# Patient Record
Sex: MALE | Race: BLACK OR AFRICAN AMERICAN | NOT HISPANIC OR LATINO | URBAN - METROPOLITAN AREA
[De-identification: names, ages, dates, MRNs, and addresses within clinical notes are randomized per-mention and may not be internally consistent; named-entity substitution may affect disease eponyms.]

---

## 2020-01-17 VITALS
HEIGHT: 70 IN | RESPIRATION RATE: 18 BRPM | DIASTOLIC BLOOD PRESSURE: 79 MMHG | OXYGEN SATURATION: 96 % | WEIGHT: 220.02 LBS | HEART RATE: 71 BPM | SYSTOLIC BLOOD PRESSURE: 132 MMHG | TEMPERATURE: 98 F

## 2020-01-17 NOTE — ED ADULT TRIAGE NOTE - CHIEF COMPLAINT QUOTE
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

## 2020-01-17 NOTE — ED ADULT NURSE NOTE - CHIEF COMPLAINT QUOTE
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

## 2020-01-18 ENCOUNTER — INPATIENT (INPATIENT)
Facility: HOSPITAL | Age: 40
LOS: 45 days | Discharge: HOME | DRG: 885 | End: 2020-03-04
Attending: PSYCHIATRY & NEUROLOGY | Admitting: PSYCHIATRY & NEUROLOGY
Payer: MEDICARE

## 2020-01-18 DIAGNOSIS — F29 UNSPECIFIED PSYCHOSIS NOT DUE TO A SUBSTANCE OR KNOWN PHYSIOLOGICAL CONDITION: ICD-10-CM

## 2020-01-18 LAB
ALBUMIN SERPL ELPH-MCNC: 4.1 G/DL — SIGNIFICANT CHANGE UP (ref 3.3–5)
ALP SERPL-CCNC: 64 U/L — SIGNIFICANT CHANGE UP (ref 40–120)
ALT FLD-CCNC: 44 U/L — SIGNIFICANT CHANGE UP (ref 10–45)
AMPHET UR-MCNC: NEGATIVE — SIGNIFICANT CHANGE UP
ANION GAP SERPL CALC-SCNC: 11 MMOL/L — SIGNIFICANT CHANGE UP (ref 5–17)
APAP SERPL-MCNC: <5 UG/ML — LOW (ref 10–30)
APPEARANCE UR: CLEAR — SIGNIFICANT CHANGE UP
AST SERPL-CCNC: 52 U/L — HIGH (ref 10–40)
BARBITURATES UR SCN-MCNC: NEGATIVE — SIGNIFICANT CHANGE UP
BASOPHILS # BLD AUTO: 0.04 K/UL — SIGNIFICANT CHANGE UP (ref 0–0.2)
BASOPHILS NFR BLD AUTO: 0.5 % — SIGNIFICANT CHANGE UP (ref 0–2)
BENZODIAZ UR-MCNC: NEGATIVE — SIGNIFICANT CHANGE UP
BILIRUB SERPL-MCNC: 0.6 MG/DL — SIGNIFICANT CHANGE UP (ref 0.2–1.2)
BILIRUB UR-MCNC: NEGATIVE — SIGNIFICANT CHANGE UP
BUN SERPL-MCNC: 17 MG/DL — SIGNIFICANT CHANGE UP (ref 7–23)
CALCIUM SERPL-MCNC: 9.2 MG/DL — SIGNIFICANT CHANGE UP (ref 8.4–10.5)
CHLORIDE SERPL-SCNC: 100 MMOL/L — SIGNIFICANT CHANGE UP (ref 96–108)
CO2 SERPL-SCNC: 26 MMOL/L — SIGNIFICANT CHANGE UP (ref 22–31)
COCAINE METAB.OTHER UR-MCNC: NEGATIVE — SIGNIFICANT CHANGE UP
COLOR SPEC: YELLOW — SIGNIFICANT CHANGE UP
CREAT SERPL-MCNC: 0.83 MG/DL — SIGNIFICANT CHANGE UP (ref 0.5–1.3)
DIFF PNL FLD: NEGATIVE — SIGNIFICANT CHANGE UP
EOSINOPHIL # BLD AUTO: 0.21 K/UL — SIGNIFICANT CHANGE UP (ref 0–0.5)
EOSINOPHIL NFR BLD AUTO: 2.8 % — SIGNIFICANT CHANGE UP (ref 0–6)
ETHANOL SERPL-MCNC: <10 MG/DL — SIGNIFICANT CHANGE UP (ref 0–10)
GLUCOSE SERPL-MCNC: 82 MG/DL — SIGNIFICANT CHANGE UP (ref 70–99)
GLUCOSE UR QL: NEGATIVE — SIGNIFICANT CHANGE UP
HCT VFR BLD CALC: 43 % — SIGNIFICANT CHANGE UP (ref 39–50)
HGB BLD-MCNC: 14.3 G/DL — SIGNIFICANT CHANGE UP (ref 13–17)
IMM GRANULOCYTES NFR BLD AUTO: 0.1 % — SIGNIFICANT CHANGE UP (ref 0–1.5)
KETONES UR-MCNC: 15 MG/DL
LEUKOCYTE ESTERASE UR-ACNC: NEGATIVE — SIGNIFICANT CHANGE UP
LYMPHOCYTES # BLD AUTO: 1.54 K/UL — SIGNIFICANT CHANGE UP (ref 1–3.3)
LYMPHOCYTES # BLD AUTO: 20.6 % — SIGNIFICANT CHANGE UP (ref 13–44)
MCHC RBC-ENTMCNC: 25.8 PG — LOW (ref 27–34)
MCHC RBC-ENTMCNC: 33.3 GM/DL — SIGNIFICANT CHANGE UP (ref 32–36)
MCV RBC AUTO: 77.5 FL — LOW (ref 80–100)
METHADONE UR-MCNC: NEGATIVE — SIGNIFICANT CHANGE UP
MONOCYTES # BLD AUTO: 0.71 K/UL — SIGNIFICANT CHANGE UP (ref 0–0.9)
MONOCYTES NFR BLD AUTO: 9.5 % — SIGNIFICANT CHANGE UP (ref 2–14)
NEUTROPHILS # BLD AUTO: 4.97 K/UL — SIGNIFICANT CHANGE UP (ref 1.8–7.4)
NEUTROPHILS NFR BLD AUTO: 66.5 % — SIGNIFICANT CHANGE UP (ref 43–77)
NITRITE UR-MCNC: NEGATIVE — SIGNIFICANT CHANGE UP
NRBC # BLD: 0 /100 WBCS — SIGNIFICANT CHANGE UP (ref 0–0)
OPIATES UR-MCNC: NEGATIVE — SIGNIFICANT CHANGE UP
PCP SPEC-MCNC: SIGNIFICANT CHANGE UP
PCP UR-MCNC: NEGATIVE — SIGNIFICANT CHANGE UP
PH UR: 6 — SIGNIFICANT CHANGE UP (ref 5–8)
PLATELET # BLD AUTO: 263 K/UL — SIGNIFICANT CHANGE UP (ref 150–400)
POTASSIUM SERPL-MCNC: 4.2 MMOL/L — SIGNIFICANT CHANGE UP (ref 3.5–5.3)
POTASSIUM SERPL-SCNC: 4.2 MMOL/L — SIGNIFICANT CHANGE UP (ref 3.5–5.3)
PROT SERPL-MCNC: 7.5 G/DL — SIGNIFICANT CHANGE UP (ref 6–8.3)
PROT UR-MCNC: NEGATIVE MG/DL — SIGNIFICANT CHANGE UP
RBC # BLD: 5.55 M/UL — SIGNIFICANT CHANGE UP (ref 4.2–5.8)
RBC # FLD: 14.2 % — SIGNIFICANT CHANGE UP (ref 10.3–14.5)
SALICYLATES SERPL-MCNC: <0.3 MG/DL — LOW (ref 2.8–20)
SODIUM SERPL-SCNC: 137 MMOL/L — SIGNIFICANT CHANGE UP (ref 135–145)
SP GR SPEC: >=1.03 — SIGNIFICANT CHANGE UP (ref 1–1.03)
THC UR QL: POSITIVE
TSH SERPL-MCNC: 2.5 UIU/ML — SIGNIFICANT CHANGE UP (ref 0.35–4.94)
UROBILINOGEN FLD QL: 0.2 E.U./DL — SIGNIFICANT CHANGE UP
WBC # BLD: 7.48 K/UL — SIGNIFICANT CHANGE UP (ref 3.8–10.5)
WBC # FLD AUTO: 7.48 K/UL — SIGNIFICANT CHANGE UP (ref 3.8–10.5)

## 2020-01-18 PROCEDURE — 90792 PSYCH DIAG EVAL W/MED SRVCS: CPT | Mod: GT

## 2020-01-18 PROCEDURE — 93010 ELECTROCARDIOGRAM REPORT: CPT

## 2020-01-18 PROCEDURE — 70450 CT HEAD/BRAIN W/O DYE: CPT | Mod: 26

## 2020-01-18 PROCEDURE — 71046 X-RAY EXAM CHEST 2 VIEWS: CPT | Mod: 26

## 2020-01-18 PROCEDURE — 99285 EMERGENCY DEPT VISIT HI MDM: CPT

## 2020-01-18 RX ORDER — TRAZODONE HCL 50 MG
50 TABLET ORAL AT BEDTIME
Refills: 0 | Status: DISCONTINUED | OUTPATIENT
Start: 2020-01-18 | End: 2020-01-30

## 2020-01-18 RX ORDER — OLANZAPINE 15 MG/1
5 TABLET, FILM COATED ORAL DAILY
Refills: 0 | Status: DISCONTINUED | OUTPATIENT
Start: 2020-01-18 | End: 2020-01-19

## 2020-01-18 RX ORDER — HYDROXYZINE HCL 10 MG
25 TABLET ORAL EVERY 6 HOURS
Refills: 0 | Status: DISCONTINUED | OUTPATIENT
Start: 2020-01-18 | End: 2020-02-12

## 2020-01-18 RX ORDER — IBUPROFEN 200 MG
600 TABLET ORAL EVERY 6 HOURS
Refills: 0 | Status: DISCONTINUED | OUTPATIENT
Start: 2020-01-18 | End: 2020-02-19

## 2020-01-18 RX ORDER — OLANZAPINE 15 MG/1
5 TABLET, FILM COATED ORAL ONCE
Refills: 0 | Status: COMPLETED | OUTPATIENT
Start: 2020-01-18 | End: 2020-01-18

## 2020-01-18 RX ADMIN — Medication 1 MILLIGRAM(S): at 06:04

## 2020-01-18 RX ADMIN — Medication 600 MILLIGRAM(S): at 13:07

## 2020-01-18 RX ADMIN — OLANZAPINE 5 MILLIGRAM(S): 15 TABLET, FILM COATED ORAL at 12:37

## 2020-01-18 NOTE — ED BEHAVIORAL HEALTH ASSESSMENT NOTE - DETAILS
pt tells me he has tried to hurt himself and that he has tried to cut himself. He later says he has tried to kill himself with "medicine, drugs, all kinds of stuff" says didn't respond well to "the shot" self referred discussed with ED attending

## 2020-01-18 NOTE — ED BEHAVIORAL HEALTH ASSESSMENT NOTE - DESCRIPTION
PRE-HOSPITAL COURSE  ===================  SOURCE:  Second-hand information via covering RN.     DETAILS:  Patient self-presented to the ED.    ============  ED COURSE   ============  SOURCE:  Covering RN, Bal.   ARRIVAL:  Patient self-presented and unaccompanied by family or social supports.  BELONGINGS:  Clothing and backpack. Nothing of note in patient’s belongings.   BEHAVIOR: Complied with triage protocols - provided blood/urine changed into a hospital gown, allowed security to wand/collect belongings without incident, denies SI/HI, is guarded with a flat affect, speech is at a normal rate, clear/audible, fair articulation/tone, linear thought content, fair judgement/insight, poor eye contact (looking at the floor), fair hygiene/grooming, mildly malodorous, dressed appropriately for the weather, endorsed AH (refused to disclose any further) denies delusions, ate a meal, has been drinking fluids, resting calmly on the stretcher and is AXO3. No aggression or behavioral issues reported.  TREATMENT: No prns meds, no security interventions or restraints required. benign neck tumor removed 8 years ago Pt born in [place] and reports coming here 1-2 weeks ago via [employer] as he felt endangered. He reports unclear employment and no current housing stating "I'm trying to get my priorities straight." He completed his GED, says he is  and states "who knows:" when asked if he has children. He can not identify any supports but rather states it's possible that people are looking out for him.

## 2020-01-18 NOTE — ED BEHAVIORAL HEALTH ASSESSMENT NOTE - ADDITIONAL DETAILS / COMMENTS
Notably unable to spell world backwards, earth forward, or his first name Kuldip claros. Replies Notably unable to spell world backwards, earth forward, or his first name Kuldip backwards. Replies vaguely to questions regarding specific symptoms. Pt is appropriately clothed and groomed with freshly braided hair.

## 2020-01-18 NOTE — ED PROVIDER NOTE - OBJECTIVE STATEMENT
38 y/o M, reports years of lymphadenopathy, reports he had a neck tumor that was biopsied and benign in the past; however details unclear, presents to the ED after hearing voices, but is unwilling to talk about that the voices are saying. When asked if suicidal, pt did not endorse this, but said "well, I could be." Denies HI or previous admitted psychiatric diagnoses. Denies sore throat, URI symptoms, weight loss, or night sweats. Pt endorses marijuana use 1 month ago.

## 2020-01-18 NOTE — ED PROVIDER NOTE - PSYCHIATRIC, MLM
Alert and oriented to person, place, time/situation. paranoid and flat affect. no apparent risk to self or others.

## 2020-01-18 NOTE — ED BEHAVIORAL HEALTH ASSESSMENT NOTE - RISK ASSESSMENT
unable to determine suicide risk based on unreliable history provision. Unable to determine Suicide Risk

## 2020-01-18 NOTE — ED ADULT NURSE REASSESSMENT NOTE - NS ED NURSE REASSESS COMMENT FT1
Pt received from NOELLE Figueroa. Pt reports suicidal/homicidal ideation at this time, visual and auditory hallucinations. Pt calm and lying in stretcher at this time, awaiting telepsych consult. Meal provided. Pt denies physical complaints, speaking in full clear sentences. No injuries noted.

## 2020-01-18 NOTE — ED BEHAVIORAL HEALTH ASSESSMENT NOTE - REASON FOR REFERRAL
Quality 226: Preventive Care And Screening: Tobacco Use: Screening And Cessation Intervention: Patient screened for tobacco use and is an ex/non-smoker Quality 130: Documentation Of Current Medications In The Medical Record: Current Medications Documented Quality 431: Preventive Care And Screening: Unhealthy Alcohol Use - Screening: Patient screened for unhealthy alcohol use using a single question and scores less than 2 times per year Detail Level: Detailed Auditory Hallucinations
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
## 2020-01-18 NOTE — ED BEHAVIORAL HEALTH ASSESSMENT NOTE - DIFFERENTIAL
Unspecified Psychosis  Malingering  Schizophrenia vs Schizoaffective disorder vs mood disorder with psychotic features vs substance induced mood and psychotic disorder.

## 2020-01-18 NOTE — ED BEHAVIORAL HEALTH ASSESSMENT NOTE - OTHER PAST PSYCHIATRIC HISTORY (INCLUDE DETAILS REGARDING ONSET, COURSE OF ILLNESS, INPATIENT/OUTPATIENT TREATMENT)
Pt relays having past psychiatric history "for suicidal." He reports diagnoses of "ADD, all kinds of stuff." He initially says this is his second time in the hospital for psychiatric reasons and says that he remained in the ED. Later he tells me he was admitted to [facility] in [place] a month and half ago for a week. He says he has tried Zyprexa and Remeron, then says Zyprexa when asked what he is prescribed and says he go it from [facility]. Later when asked what medications he was given in the hospital, he says he does not recall. He denies any outpatient psychiatric providers or therapists in his past. He tells me he has taken medications where he had to check in if he ran out, says he has taken shots but prefers the pills then says he only took the shot once.

## 2020-01-18 NOTE — ED PROVIDER NOTE - CLINICAL SUMMARY MEDICAL DECISION MAKING FREE TEXT BOX
40 y/o M with reported chronic lymphadenopathy, 1 tiny 2mm lymph node felt, no matted or tender lymph nodes/masses noted. Advised PCP f/u. Doubt malignancy or active infection. Will psychiatrically evaluate, psych consult pending, medical clearance labs sent.

## 2020-01-18 NOTE — ED PROVIDER NOTE - PROGRESS NOTE DETAILS
pt evaluated by telepsych - will hold til morning for reevaluation by new team pt evaluated in ED by psych and recommend zyprexa 5mg and admit

## 2020-01-18 NOTE — ED BEHAVIORAL HEALTH NOTE - BEHAVIORAL HEALTH NOTE
PRE-HOSPITAL COURSE  ===================  SOURCE:  Second-hand information via covering RN.     DETAILS:  Patient self-presented to the ED.    ============  ED COURSE   ============  SOURCE:  Covering RN, Bal.   ARRIVAL:  Patient self-presented and unaccompanied by family or social supports.  BELONGINGS:  Clothing and backpack. Nothing of note in patient’s belongings.   BEHAVIOR: Complied with triage protocols - provided blood/urine changed into a hospital gown, allowed security to wand/collect belongings without incident, denies SI/HI, is guarded with a flat affect, speech is at a normal rate, clear/audible, fair articulation/tone, linear thought content, fair judgement/insight, poor eye contact (looking at the floor), fair hygiene/grooming, mildly malodorous, dressed appropriately for the weather, endorsed AH (refused to disclose any further) denies delusions, ate a meal, has been drinking fluids, resting calmly on the stretcher and is AXO3. No aggression or behavioral issues reported.  TREATMENT: No prns meds, no security interventions or restraints required.

## 2020-01-18 NOTE — ED BEHAVIORAL HEALTH ASSESSMENT NOTE - OTHER
N/A pt reports he was admitted to [facility] in [place] for a week about a month and half ago notably with increased psychomotor agitation and disorganization when disposition is being relayed to him. not observed some clang associations unable to determine if inadvertently vs volitionally impaired

## 2020-01-18 NOTE — ED BEHAVIORAL HEALTH ASSESSMENT NOTE - SUMMARY
This is a 39 year old unemployed, undomiciled, male from [place] with unclear prior psychiatric illness history self presenting with auditory hallucinations stating "I just need some psychiatric help because things is not working out." He describes limited symptoms of depression and psychosis quite vaguely despite both open and targeted closed ended questioning. He provides inconsistent history reporting and appearance and behaviors in the ED are inconsistent with the degree of destabilization he implies. Malingering is a plausible explanation for his inconsistencies especially when first assessed to be calm, cooperative and simply inconsistent and vague in history. Nonetheless, a primary psychotic disorder remains possible given his degree of disorganization, initial guardedness on presentation and notably he did become increasingly agitated and disorganized with impaired reasoning during disposition discussion. It would be helpful to further observe his behaviors (particularly when he is unaware of close monitoring) to assess for irregularities in sleep, appetite, preoccupation or psychomotor activity and obtain outside hospital records to clarify if a primary psychiatric condition is responsible for his presentation and whether inpatient hospitalization is warranted.

## 2020-01-18 NOTE — ED BEHAVIORAL HEALTH ASSESSMENT NOTE - DESCRIPTION (FIRST USE, LAST USE, QUANTITY, FREQUENCY, DURATION)
"once in a while" cigarette use "off and on" thc use says he was on opioids after removal of a benign tumor 8 years ago

## 2020-01-19 LAB
CULTURE RESULTS: NO GROWTH — SIGNIFICANT CHANGE UP
SPECIMEN SOURCE: SIGNIFICANT CHANGE UP

## 2020-01-19 PROCEDURE — 99222 1ST HOSP IP/OBS MODERATE 55: CPT

## 2020-01-19 RX ORDER — OLANZAPINE 15 MG/1
5 TABLET, FILM COATED ORAL ONCE
Refills: 0 | Status: COMPLETED | OUTPATIENT
Start: 2020-01-19 | End: 2020-01-19

## 2020-01-19 RX ORDER — OLANZAPINE 15 MG/1
5 TABLET, FILM COATED ORAL
Refills: 0 | Status: DISCONTINUED | OUTPATIENT
Start: 2020-01-19 | End: 2020-01-22

## 2020-01-19 RX ADMIN — OLANZAPINE 5 MILLIGRAM(S): 15 TABLET, FILM COATED ORAL at 11:11

## 2020-01-19 NOTE — BEHAVIORAL HEALTH ASSESSMENT NOTE - RISK ASSESSMENT
Low Acute Suicide Risk chronic risk elevated due to history of noncompliance, possible character pathology, substance use, SPMO chronic risk elevated due to history of noncompliance, possible character pathology, substance use, SPMI

## 2020-01-19 NOTE — BEHAVIORAL HEALTH ASSESSMENT NOTE - DETAILS
pt tells me he has tried to hurt himself and that he has tried to cut himself. He later says he has tried to kill himself with "medicine, drugs, all kinds of stuff" says didn't respond well to "the shot" pt says he has tried to hurt himself and that he has tried to cut himself. He later says he has tried to kill himself with "medicine, drugs, all kinds of stuff"
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
## 2020-01-19 NOTE — BEHAVIORAL HEALTH ASSESSMENT NOTE - NSBHCHARTREVIEWLAB_PSY_A_CORE FT
14.3   7.48  )-----------( 263      ( 18 Jan 2020 00:38 )             43.0   01-18    137  |  100  |  17  ----------------------------<  82  4.2   |  26  |  0.83    Ca    9.2      18 Jan 2020 00:38    TPro  7.5  /  Alb  4.1  /  TBili  0.6  /  DBili  x   /  AST  52<H>  /  ALT  44  /  AlkPhos  64  01-18

## 2020-01-19 NOTE — BEHAVIORAL HEALTH ASSESSMENT NOTE - NSBHCHARTREVIEWVS_PSY_A_CORE FT
Vital Signs Last 24 Hrs  T(C): 36.9 (18 Jan 2020 14:41), Max: 36.9 (18 Jan 2020 14:41)  T(F): 98.5 (18 Jan 2020 14:41), Max: 98.5 (18 Jan 2020 14:41)  HR: 66 (18 Jan 2020 14:41) (66 - 82)  BP: 109/72 (18 Jan 2020 14:41) (109/72 - 115/78)  BP(mean): --  RR: 18 (18 Jan 2020 14:41) (18 - 18)  SpO2: 99% (18 Jan 2020 14:41) (99% - 100%)

## 2020-01-19 NOTE — BEHAVIORAL HEALTH ASSESSMENT NOTE - ADDITIONAL DETAILS / COMMENTS
Notably unable to spell world backwards, earth forward, or his first name Kuldip backwards. Replies vaguely to questions regarding specific symptoms. Pt is appropriately clothed and groomed with freshly braided hair.

## 2020-01-19 NOTE — BEHAVIORAL HEALTH ASSESSMENT NOTE - SUMMARY
This is a 39 year old unemployed, undomiciled, male from [place] with unclear prior psychiatric illness history self presenting with auditory hallucinations stating "I just need some psychiatric help because things is not working out." He describes limited symptoms of depression and psychosis quite vaguely despite both open and targeted closed ended questioning. He provides inconsistent history reporting and appearance and behaviors in the ED are inconsistent with the degree of destabilization he implies. Malingering is a plausible explanation for his inconsistencies especially when first assessed to be calm, cooperative and simply inconsistent and vague in history. Nonetheless, a primary psychotic disorder remains possible given his degree of disorganization, initial guardedness on presentation and notably he did become increasingly agitated and disorganized with impaired reasoning during disposition discussion.    Admit to [facility]  Zyprexa 5mg Daily This is a 39 year old unemployed, undomiciled, male from [place] with unclear prior psychiatric illness history self presenting with auditory hallucinations stating "I just need some psychiatric help because things is not working out." He describes limited symptoms of depression and psychosis quite vaguely despite both open and targeted closed ended questioning. He provides inconsistent history reporting and appearance and behaviors in the ED are inconsistent with the degree of destabilization he implies. Malingering is a plausible explanation for his inconsistencies especially when first assessed to be calm, cooperative and simply inconsistent and vague in history. Nonetheless, a primary psychotic disorder remains possible given his degree of disorganization, initial guardedness on presentation and notably he did become increasingly agitated and disorganized with impaired reasoning during disposition discussion.    Admit to [facility]  Zyprexa 5mg Daily, consider uptitrating as necessary

## 2020-01-19 NOTE — BEHAVIORAL HEALTH ASSESSMENT NOTE - OTHER
not observed some clang associations unable to determine if inadvertently vs volitionally impaired pt reports he was admitted to [facility] in [place] for a week about a month and half ago

## 2020-01-20 RX ADMIN — OLANZAPINE 5 MILLIGRAM(S): 15 TABLET, FILM COATED ORAL at 22:02

## 2020-01-20 RX ADMIN — Medication 50 MILLIGRAM(S): at 02:36

## 2020-01-20 RX ADMIN — Medication 50 MILLIGRAM(S): at 23:42

## 2020-01-20 RX ADMIN — Medication 600 MILLIGRAM(S): at 12:04

## 2020-01-20 RX ADMIN — Medication 600 MILLIGRAM(S): at 14:02

## 2020-01-20 NOTE — PROGRESS NOTE BEHAVIORAL HEALTH - NSBHFUPINTERVALHXFT_PSY_A_CORE
Pt interviewed at bedside. Reports difficult time sleeping last night because of "daniel." Denies SI/HI.

## 2020-01-21 DIAGNOSIS — F20.0 PARANOID SCHIZOPHRENIA: ICD-10-CM

## 2020-01-21 PROCEDURE — 99233 SBSQ HOSP IP/OBS HIGH 50: CPT

## 2020-01-21 RX ADMIN — OLANZAPINE 5 MILLIGRAM(S): 15 TABLET, FILM COATED ORAL at 22:10

## 2020-01-21 RX ADMIN — Medication 50 MILLIGRAM(S): at 22:10

## 2020-01-21 RX ADMIN — OLANZAPINE 5 MILLIGRAM(S): 15 TABLET, FILM COATED ORAL at 10:35

## 2020-01-22 LAB
CHOLEST SERPL-MCNC: 182 MG/DL — SIGNIFICANT CHANGE UP (ref 10–199)
HBA1C BLD-MCNC: 5.5 % — SIGNIFICANT CHANGE UP (ref 4–5.6)
HDLC SERPL-MCNC: 58 MG/DL — SIGNIFICANT CHANGE UP
LIPID PNL WITH DIRECT LDL SERPL: 111 MG/DL — HIGH
TOTAL CHOLESTEROL/HDL RATIO MEASUREMENT: 3.1 RATIO — LOW (ref 3.4–9.6)
TRIGL SERPL-MCNC: 65 MG/DL — SIGNIFICANT CHANGE UP (ref 10–149)

## 2020-01-22 PROCEDURE — 99232 SBSQ HOSP IP/OBS MODERATE 35: CPT

## 2020-01-22 RX ORDER — RISPERIDONE 4 MG/1
1 TABLET ORAL DAILY
Refills: 0 | Status: DISCONTINUED | OUTPATIENT
Start: 2020-01-22 | End: 2020-01-27

## 2020-01-22 RX ADMIN — RISPERIDONE 1 MILLIGRAM(S): 4 TABLET ORAL at 14:34

## 2020-01-22 RX ADMIN — OLANZAPINE 5 MILLIGRAM(S): 15 TABLET, FILM COATED ORAL at 22:09

## 2020-01-23 PROCEDURE — 99232 SBSQ HOSP IP/OBS MODERATE 35: CPT

## 2020-01-23 RX ADMIN — Medication 600 MILLIGRAM(S): at 22:25

## 2020-01-23 RX ADMIN — Medication 600 MILLIGRAM(S): at 12:08

## 2020-01-23 RX ADMIN — Medication 600 MILLIGRAM(S): at 19:33

## 2020-01-23 RX ADMIN — RISPERIDONE 1 MILLIGRAM(S): 4 TABLET ORAL at 10:20

## 2020-01-23 RX ADMIN — Medication 600 MILLIGRAM(S): at 23:00

## 2020-01-23 RX ADMIN — Medication 50 MILLIGRAM(S): at 22:25

## 2020-01-24 PROCEDURE — 99232 SBSQ HOSP IP/OBS MODERATE 35: CPT

## 2020-01-24 RX ADMIN — RISPERIDONE 1 MILLIGRAM(S): 4 TABLET ORAL at 10:03

## 2020-01-24 RX ADMIN — Medication 600 MILLIGRAM(S): at 13:00

## 2020-01-24 RX ADMIN — Medication 600 MILLIGRAM(S): at 11:30

## 2020-01-26 RX ADMIN — RISPERIDONE 1 MILLIGRAM(S): 4 TABLET ORAL at 09:54

## 2020-01-27 PROCEDURE — 99231 SBSQ HOSP IP/OBS SF/LOW 25: CPT

## 2020-01-27 RX ORDER — RISPERIDONE 4 MG/1
1.5 TABLET ORAL DAILY
Refills: 0 | Status: DISCONTINUED | OUTPATIENT
Start: 2020-01-28 | End: 2020-01-29

## 2020-01-27 RX ADMIN — Medication 600 MILLIGRAM(S): at 15:43

## 2020-01-27 RX ADMIN — Medication 600 MILLIGRAM(S): at 16:15

## 2020-01-27 RX ADMIN — Medication 600 MILLIGRAM(S): at 22:38

## 2020-01-27 RX ADMIN — RISPERIDONE 1 MILLIGRAM(S): 4 TABLET ORAL at 09:55

## 2020-01-28 PROCEDURE — 96156 HLTH BHV ASSMT/REASSESSMENT: CPT

## 2020-01-28 RX ADMIN — RISPERIDONE 1.5 MILLIGRAM(S): 4 TABLET ORAL at 09:51

## 2020-01-29 PROCEDURE — 99232 SBSQ HOSP IP/OBS MODERATE 35: CPT

## 2020-01-29 RX ORDER — RISPERIDONE 4 MG/1
2 TABLET ORAL DAILY
Refills: 0 | Status: DISCONTINUED | OUTPATIENT
Start: 2020-01-30 | End: 2020-01-30

## 2020-01-29 RX ADMIN — Medication 600 MILLIGRAM(S): at 14:12

## 2020-01-29 RX ADMIN — RISPERIDONE 1.5 MILLIGRAM(S): 4 TABLET ORAL at 11:17

## 2020-01-29 RX ADMIN — Medication 25 MILLIGRAM(S): at 15:33

## 2020-01-29 RX ADMIN — Medication 50 MILLIGRAM(S): at 22:23

## 2020-01-29 RX ADMIN — Medication 600 MILLIGRAM(S): at 12:54

## 2020-01-30 PROCEDURE — 99231 SBSQ HOSP IP/OBS SF/LOW 25: CPT

## 2020-01-30 RX ORDER — RISPERIDONE 4 MG/1
2.5 TABLET ORAL DAILY
Refills: 0 | Status: DISCONTINUED | OUTPATIENT
Start: 2020-01-31 | End: 2020-01-31

## 2020-01-30 RX ORDER — TRAZODONE HCL 50 MG
100 TABLET ORAL AT BEDTIME
Refills: 0 | Status: DISCONTINUED | OUTPATIENT
Start: 2020-01-30 | End: 2020-03-04

## 2020-01-30 RX ADMIN — RISPERIDONE 2 MILLIGRAM(S): 4 TABLET ORAL at 10:18

## 2020-01-30 RX ADMIN — Medication 100 MILLIGRAM(S): at 21:56

## 2020-01-31 PROCEDURE — 99232 SBSQ HOSP IP/OBS MODERATE 35: CPT

## 2020-01-31 RX ORDER — RISPERIDONE 4 MG/1
3 TABLET ORAL DAILY
Refills: 0 | Status: DISCONTINUED | OUTPATIENT
Start: 2020-02-01 | End: 2020-02-03

## 2020-01-31 RX ADMIN — RISPERIDONE 2.5 MILLIGRAM(S): 4 TABLET ORAL at 12:45

## 2020-01-31 RX ADMIN — Medication 100 MILLIGRAM(S): at 22:09

## 2020-02-01 RX ADMIN — RISPERIDONE 3 MILLIGRAM(S): 4 TABLET ORAL at 10:56

## 2020-02-01 RX ADMIN — Medication 25 MILLIGRAM(S): at 12:40

## 2020-02-02 RX ADMIN — RISPERIDONE 3 MILLIGRAM(S): 4 TABLET ORAL at 09:47

## 2020-02-03 PROCEDURE — 99232 SBSQ HOSP IP/OBS MODERATE 35: CPT

## 2020-02-03 RX ORDER — RISPERIDONE 4 MG/1
3.5 TABLET ORAL DAILY
Refills: 0 | Status: DISCONTINUED | OUTPATIENT
Start: 2020-02-03 | End: 2020-02-05

## 2020-02-03 RX ADMIN — RISPERIDONE 3 MILLIGRAM(S): 4 TABLET ORAL at 09:35

## 2020-02-04 PROCEDURE — 99232 SBSQ HOSP IP/OBS MODERATE 35: CPT

## 2020-02-04 RX ADMIN — Medication 600 MILLIGRAM(S): at 14:50

## 2020-02-04 RX ADMIN — Medication 600 MILLIGRAM(S): at 15:50

## 2020-02-04 RX ADMIN — RISPERIDONE 3.5 MILLIGRAM(S): 4 TABLET ORAL at 10:01

## 2020-02-05 PROCEDURE — 99232 SBSQ HOSP IP/OBS MODERATE 35: CPT

## 2020-02-05 RX ORDER — RISPERIDONE 4 MG/1
4 TABLET ORAL DAILY
Refills: 0 | Status: DISCONTINUED | OUTPATIENT
Start: 2020-02-06 | End: 2020-02-10

## 2020-02-05 RX ADMIN — RISPERIDONE 3.5 MILLIGRAM(S): 4 TABLET ORAL at 09:58

## 2020-02-06 PROCEDURE — 99232 SBSQ HOSP IP/OBS MODERATE 35: CPT

## 2020-02-06 RX ADMIN — Medication 100 MILLIGRAM(S): at 21:22

## 2020-02-06 RX ADMIN — RISPERIDONE 4 MILLIGRAM(S): 4 TABLET ORAL at 09:49

## 2020-02-07 PROCEDURE — 99232 SBSQ HOSP IP/OBS MODERATE 35: CPT

## 2020-02-07 RX ORDER — HALOPERIDOL DECANOATE 100 MG/ML
5 INJECTION INTRAMUSCULAR EVERY 6 HOURS
Refills: 0 | Status: DISCONTINUED | OUTPATIENT
Start: 2020-02-07 | End: 2020-03-04

## 2020-02-07 RX ORDER — DIPHENHYDRAMINE HCL 50 MG
50 CAPSULE ORAL EVERY 6 HOURS
Refills: 0 | Status: DISCONTINUED | OUTPATIENT
Start: 2020-02-07 | End: 2020-03-04

## 2020-02-07 RX ADMIN — HALOPERIDOL DECANOATE 5 MILLIGRAM(S): 100 INJECTION INTRAMUSCULAR at 01:45

## 2020-02-07 RX ADMIN — Medication 2 MILLIGRAM(S): at 01:45

## 2020-02-07 RX ADMIN — RISPERIDONE 4 MILLIGRAM(S): 4 TABLET ORAL at 10:16

## 2020-02-07 RX ADMIN — Medication 50 MILLIGRAM(S): at 01:45

## 2020-02-07 NOTE — PROGRESS NOTE BEHAVIORAL HEALTH - DETAILS
pt says he has tried to hurt himself and that he has tried to cut himself. He later says he has tried to kill himself with "medicine, drugs, all kinds of stuff"

## 2020-02-08 RX ADMIN — RISPERIDONE 4 MILLIGRAM(S): 4 TABLET ORAL at 09:35

## 2020-02-08 RX ADMIN — Medication 600 MILLIGRAM(S): at 11:43

## 2020-02-08 RX ADMIN — Medication 600 MILLIGRAM(S): at 12:30

## 2020-02-09 RX ADMIN — RISPERIDONE 4 MILLIGRAM(S): 4 TABLET ORAL at 09:58

## 2020-02-09 RX ADMIN — Medication 600 MILLIGRAM(S): at 01:22

## 2020-02-09 RX ADMIN — Medication 600 MILLIGRAM(S): at 00:45

## 2020-02-10 PROCEDURE — 99232 SBSQ HOSP IP/OBS MODERATE 35: CPT

## 2020-02-10 RX ORDER — RISPERIDONE 4 MG/1
5 TABLET ORAL DAILY
Refills: 0 | Status: DISCONTINUED | OUTPATIENT
Start: 2020-02-10 | End: 2020-02-12

## 2020-02-10 RX ADMIN — RISPERIDONE 4 MILLIGRAM(S): 4 TABLET ORAL at 10:31

## 2020-02-10 NOTE — PROGRESS NOTE BEHAVIORAL HEALTH - NSBHFUPINTERVALHXFT_PSY_A_CORE
Taking m-tabs; unwilling to cooperate with assessment. Isolative, psychotic; staring at the wall all alone saying he is a in a hotel and that he is ready to be discharged. Nurses report pt has been wearing towels in both feet

## 2020-02-10 NOTE — PROGRESS NOTE BEHAVIORAL HEALTH - NS ED BHA SUICIDALITY PRESENT CURRENT INTENT DETAILS COLLATERAL FT
none available

## 2020-02-10 NOTE — PROGRESS NOTE BEHAVIORAL HEALTH - ORIENTATION OTHER
States reading somewhere the day was the 17th but states month/year as Oct/Nov 2019 refuses to cooperate

## 2020-02-11 PROCEDURE — 99232 SBSQ HOSP IP/OBS MODERATE 35: CPT

## 2020-02-11 RX ADMIN — RISPERIDONE 5 MILLIGRAM(S): 4 TABLET ORAL at 10:25

## 2020-02-11 NOTE — PROGRESS NOTE BEHAVIORAL HEALTH - NSBHFUPINTERVALHXFT_PSY_A_CORE
39 m with paranoid schizophrenia.  case discussed w interdisciplinary team  chart reviewed. per staff, pt is compliant with meds, internally preoccupied, siolative. This morning, pt is focused on being ready to be discharged.  he adds that he is feeling "wonderful", has been going to groups, slept well, no side effects from meds, deneis si/hi/ah/vh, presents superficially pleasant, will cont current tx.

## 2020-02-12 PROCEDURE — 99232 SBSQ HOSP IP/OBS MODERATE 35: CPT

## 2020-02-12 RX ORDER — RISPERIDONE 4 MG/1
6 TABLET ORAL DAILY
Refills: 0 | Status: DISCONTINUED | OUTPATIENT
Start: 2020-02-13 | End: 2020-02-19

## 2020-02-12 RX ADMIN — Medication 600 MILLIGRAM(S): at 14:17

## 2020-02-12 RX ADMIN — Medication 600 MILLIGRAM(S): at 12:59

## 2020-02-12 RX ADMIN — Medication 50 MILLIGRAM(S): at 19:53

## 2020-02-12 RX ADMIN — RISPERIDONE 5 MILLIGRAM(S): 4 TABLET ORAL at 10:28

## 2020-02-13 PROCEDURE — 99232 SBSQ HOSP IP/OBS MODERATE 35: CPT

## 2020-02-13 RX ADMIN — RISPERIDONE 6 MILLIGRAM(S): 4 TABLET ORAL at 09:16

## 2020-02-14 PROCEDURE — 99231 SBSQ HOSP IP/OBS SF/LOW 25: CPT

## 2020-02-14 RX ADMIN — Medication 600 MILLIGRAM(S): at 10:39

## 2020-02-14 RX ADMIN — Medication 600 MILLIGRAM(S): at 10:59

## 2020-02-14 RX ADMIN — Medication 50 MILLIGRAM(S): at 22:02

## 2020-02-14 RX ADMIN — RISPERIDONE 6 MILLIGRAM(S): 4 TABLET ORAL at 10:42

## 2020-02-15 RX ADMIN — RISPERIDONE 6 MILLIGRAM(S): 4 TABLET ORAL at 10:28

## 2020-02-16 RX ADMIN — Medication 50 MILLIGRAM(S): at 02:50

## 2020-02-16 RX ADMIN — RISPERIDONE 6 MILLIGRAM(S): 4 TABLET ORAL at 09:59

## 2020-02-16 RX ADMIN — Medication 50 MILLIGRAM(S): at 21:35

## 2020-02-17 RX ADMIN — RISPERIDONE 6 MILLIGRAM(S): 4 TABLET ORAL at 10:31

## 2020-02-17 RX ADMIN — Medication 100 MILLIGRAM(S): at 03:36

## 2020-02-17 RX ADMIN — Medication 50 MILLIGRAM(S): at 16:57

## 2020-02-18 PROCEDURE — 99232 SBSQ HOSP IP/OBS MODERATE 35: CPT

## 2020-02-18 RX ORDER — OLANZAPINE 15 MG/1
5 TABLET, FILM COATED ORAL
Refills: 0 | Status: DISCONTINUED | OUTPATIENT
Start: 2020-02-18 | End: 2020-02-19

## 2020-02-18 RX ADMIN — RISPERIDONE 6 MILLIGRAM(S): 4 TABLET ORAL at 09:08

## 2020-02-18 RX ADMIN — Medication 50 MILLIGRAM(S): at 12:20

## 2020-02-18 RX ADMIN — Medication 50 MILLIGRAM(S): at 19:56

## 2020-02-19 PROCEDURE — 99232 SBSQ HOSP IP/OBS MODERATE 35: CPT

## 2020-02-19 RX ORDER — OLANZAPINE 15 MG/1
10 TABLET, FILM COATED ORAL
Refills: 0 | Status: DISCONTINUED | OUTPATIENT
Start: 2020-02-20 | End: 2020-02-24

## 2020-02-19 RX ORDER — RISPERIDONE 4 MG/1
5 TABLET ORAL DAILY
Refills: 0 | Status: DISCONTINUED | OUTPATIENT
Start: 2020-02-20 | End: 2020-02-24

## 2020-02-19 RX ORDER — IBUPROFEN 200 MG
600 TABLET ORAL EVERY 6 HOURS
Refills: 0 | Status: DISCONTINUED | OUTPATIENT
Start: 2020-02-19 | End: 2020-03-04

## 2020-02-19 RX ADMIN — OLANZAPINE 5 MILLIGRAM(S): 15 TABLET, FILM COATED ORAL at 12:04

## 2020-02-19 RX ADMIN — RISPERIDONE 6 MILLIGRAM(S): 4 TABLET ORAL at 10:01

## 2020-02-20 PROCEDURE — 99232 SBSQ HOSP IP/OBS MODERATE 35: CPT

## 2020-02-20 RX ADMIN — RISPERIDONE 5 MILLIGRAM(S): 4 TABLET ORAL at 09:49

## 2020-02-20 RX ADMIN — Medication 50 MILLIGRAM(S): at 00:38

## 2020-02-20 RX ADMIN — OLANZAPINE 10 MILLIGRAM(S): 15 TABLET, FILM COATED ORAL at 12:47

## 2020-02-20 RX ADMIN — Medication 50 MILLIGRAM(S): at 16:59

## 2020-02-21 PROCEDURE — 99232 SBSQ HOSP IP/OBS MODERATE 35: CPT

## 2020-02-21 RX ADMIN — OLANZAPINE 10 MILLIGRAM(S): 15 TABLET, FILM COATED ORAL at 13:54

## 2020-02-21 RX ADMIN — Medication 50 MILLIGRAM(S): at 02:38

## 2020-02-21 RX ADMIN — RISPERIDONE 5 MILLIGRAM(S): 4 TABLET ORAL at 10:33

## 2020-02-22 RX ADMIN — RISPERIDONE 5 MILLIGRAM(S): 4 TABLET ORAL at 10:23

## 2020-02-22 RX ADMIN — OLANZAPINE 10 MILLIGRAM(S): 15 TABLET, FILM COATED ORAL at 12:37

## 2020-02-23 RX ADMIN — OLANZAPINE 10 MILLIGRAM(S): 15 TABLET, FILM COATED ORAL at 12:31

## 2020-02-23 RX ADMIN — RISPERIDONE 5 MILLIGRAM(S): 4 TABLET ORAL at 10:25

## 2020-02-24 PROCEDURE — 99232 SBSQ HOSP IP/OBS MODERATE 35: CPT

## 2020-02-24 RX ORDER — RISPERIDONE 4 MG/1
4 TABLET ORAL DAILY
Refills: 0 | Status: DISCONTINUED | OUTPATIENT
Start: 2020-02-25 | End: 2020-02-27

## 2020-02-24 RX ORDER — OLANZAPINE 15 MG/1
15 TABLET, FILM COATED ORAL
Refills: 0 | Status: DISCONTINUED | OUTPATIENT
Start: 2020-02-24 | End: 2020-02-25

## 2020-02-24 RX ADMIN — OLANZAPINE 10 MILLIGRAM(S): 15 TABLET, FILM COATED ORAL at 10:40

## 2020-02-24 RX ADMIN — RISPERIDONE 5 MILLIGRAM(S): 4 TABLET ORAL at 10:40

## 2020-02-24 RX ADMIN — Medication 50 MILLIGRAM(S): at 12:28

## 2020-02-25 PROCEDURE — 99232 SBSQ HOSP IP/OBS MODERATE 35: CPT

## 2020-02-25 RX ORDER — OLANZAPINE 15 MG/1
20 TABLET, FILM COATED ORAL AT BEDTIME
Refills: 0 | Status: DISCONTINUED | OUTPATIENT
Start: 2020-02-25 | End: 2020-03-04

## 2020-02-25 RX ADMIN — RISPERIDONE 4 MILLIGRAM(S): 4 TABLET ORAL at 10:15

## 2020-02-25 RX ADMIN — OLANZAPINE 20 MILLIGRAM(S): 15 TABLET, FILM COATED ORAL at 21:44

## 2020-02-25 RX ADMIN — Medication 50 MILLIGRAM(S): at 17:55

## 2020-02-26 PROCEDURE — 99231 SBSQ HOSP IP/OBS SF/LOW 25: CPT

## 2020-02-26 RX ADMIN — RISPERIDONE 4 MILLIGRAM(S): 4 TABLET ORAL at 10:03

## 2020-02-26 RX ADMIN — Medication 50 MILLIGRAM(S): at 13:03

## 2020-02-26 RX ADMIN — OLANZAPINE 20 MILLIGRAM(S): 15 TABLET, FILM COATED ORAL at 20:58

## 2020-02-27 PROCEDURE — 99231 SBSQ HOSP IP/OBS SF/LOW 25: CPT

## 2020-02-27 RX ORDER — RISPERIDONE 4 MG/1
3 TABLET ORAL DAILY
Refills: 0 | Status: DISCONTINUED | OUTPATIENT
Start: 2020-02-27 | End: 2020-03-02

## 2020-02-27 RX ADMIN — Medication 100 MILLIGRAM(S): at 22:19

## 2020-02-27 RX ADMIN — Medication 2 MILLIGRAM(S): at 18:24

## 2020-02-27 RX ADMIN — OLANZAPINE 20 MILLIGRAM(S): 15 TABLET, FILM COATED ORAL at 22:18

## 2020-02-27 RX ADMIN — RISPERIDONE 4 MILLIGRAM(S): 4 TABLET ORAL at 09:36

## 2020-02-28 PROCEDURE — 99231 SBSQ HOSP IP/OBS SF/LOW 25: CPT

## 2020-02-28 RX ORDER — MAGNESIUM HYDROXIDE 400 MG/1
30 TABLET, CHEWABLE ORAL EVERY 8 HOURS
Refills: 0 | Status: DISCONTINUED | OUTPATIENT
Start: 2020-02-28 | End: 2020-03-04

## 2020-02-28 RX ADMIN — RISPERIDONE 3 MILLIGRAM(S): 4 TABLET ORAL at 10:05

## 2020-02-28 RX ADMIN — Medication 100 MILLIGRAM(S): at 21:14

## 2020-02-28 RX ADMIN — HALOPERIDOL DECANOATE 5 MILLIGRAM(S): 100 INJECTION INTRAMUSCULAR at 16:57

## 2020-02-28 RX ADMIN — OLANZAPINE 20 MILLIGRAM(S): 15 TABLET, FILM COATED ORAL at 21:12

## 2020-02-28 RX ADMIN — MAGNESIUM HYDROXIDE 30 MILLILITER(S): 400 TABLET, CHEWABLE ORAL at 14:07

## 2020-02-29 RX ADMIN — Medication 100 MILLIGRAM(S): at 21:31

## 2020-02-29 RX ADMIN — RISPERIDONE 3 MILLIGRAM(S): 4 TABLET ORAL at 10:19

## 2020-02-29 RX ADMIN — MAGNESIUM HYDROXIDE 30 MILLILITER(S): 400 TABLET, CHEWABLE ORAL at 23:35

## 2020-02-29 RX ADMIN — Medication 50 MILLIGRAM(S): at 15:30

## 2020-02-29 RX ADMIN — OLANZAPINE 20 MILLIGRAM(S): 15 TABLET, FILM COATED ORAL at 21:31

## 2020-02-29 RX ADMIN — MAGNESIUM HYDROXIDE 30 MILLILITER(S): 400 TABLET, CHEWABLE ORAL at 15:30

## 2020-03-01 RX ADMIN — RISPERIDONE 3 MILLIGRAM(S): 4 TABLET ORAL at 10:37

## 2020-03-01 RX ADMIN — OLANZAPINE 20 MILLIGRAM(S): 15 TABLET, FILM COATED ORAL at 21:01

## 2020-03-01 RX ADMIN — Medication 100 MILLIGRAM(S): at 21:02

## 2020-03-01 RX ADMIN — MAGNESIUM HYDROXIDE 30 MILLILITER(S): 400 TABLET, CHEWABLE ORAL at 10:39

## 2020-03-01 RX ADMIN — MAGNESIUM HYDROXIDE 30 MILLILITER(S): 400 TABLET, CHEWABLE ORAL at 21:03

## 2020-03-02 PROCEDURE — 99231 SBSQ HOSP IP/OBS SF/LOW 25: CPT

## 2020-03-02 RX ORDER — RISPERIDONE 4 MG/1
2 TABLET ORAL DAILY
Refills: 0 | Status: DISCONTINUED | OUTPATIENT
Start: 2020-03-03 | End: 2020-03-03

## 2020-03-02 RX ADMIN — MAGNESIUM HYDROXIDE 30 MILLILITER(S): 400 TABLET, CHEWABLE ORAL at 17:27

## 2020-03-02 RX ADMIN — RISPERIDONE 3 MILLIGRAM(S): 4 TABLET ORAL at 09:58

## 2020-03-02 RX ADMIN — Medication 30 MILLILITER(S): at 21:29

## 2020-03-02 RX ADMIN — Medication 100 MILLIGRAM(S): at 21:29

## 2020-03-02 RX ADMIN — Medication 30 MILLILITER(S): at 11:40

## 2020-03-02 RX ADMIN — Medication 50 MILLIGRAM(S): at 19:20

## 2020-03-02 RX ADMIN — OLANZAPINE 20 MILLIGRAM(S): 15 TABLET, FILM COATED ORAL at 21:29

## 2020-03-02 NOTE — PROGRESS NOTE BEHAVIORAL HEALTH - NSBHFUPINTERVALHXFT_PSY_A_CORE
Pt. seen, chart reviewed, case discussed with the Interdisciplinary Treatment Team. As per Nursing Report, pt has been calm, cooperative, and compliant with medications.  Pt. seen individually, he is standing in the sims, dressed in hospital-provided clothing. Pt. reports to be feeling “good.” He has been eating and sleeping well.  Pt. denies SI/HI/AH/VH. No side effects to medications reported or observed. Pt. is looking forward to returning home and following up with his Outpatient providers.

## 2020-03-03 PROCEDURE — 99232 SBSQ HOSP IP/OBS MODERATE 35: CPT

## 2020-03-03 RX ORDER — RISPERIDONE 4 MG/1
1 TABLET ORAL DAILY
Refills: 0 | Status: COMPLETED | OUTPATIENT
Start: 2020-03-04 | End: 2020-03-04

## 2020-03-03 RX ADMIN — MAGNESIUM HYDROXIDE 30 MILLILITER(S): 400 TABLET, CHEWABLE ORAL at 17:22

## 2020-03-03 RX ADMIN — OLANZAPINE 20 MILLIGRAM(S): 15 TABLET, FILM COATED ORAL at 21:17

## 2020-03-03 RX ADMIN — Medication 100 MILLIGRAM(S): at 21:17

## 2020-03-03 RX ADMIN — Medication 30 MILLILITER(S): at 14:01

## 2020-03-03 RX ADMIN — RISPERIDONE 2 MILLIGRAM(S): 4 TABLET ORAL at 09:17

## 2020-03-03 NOTE — PROGRESS NOTE BEHAVIORAL HEALTH - NSBHFUPINTERVALHXFT_PSY_A_CORE
Pt. seen, chart reviewed, case discussed with the Interdisciplinary Treatment Team. As per Nursing Report, pt has been calm, cooperative, and compliant with medications.  Pt. seen individually, he is laying on his bed, dressed in hospital-provided clothing. Pt. reports to be feeling “good.” He has been eating and sleeping well.  Pt. denies SI/HI/AH/VH. No side effects to medications reported or observed. Pt. endorses no complaints at this time. Pt. seen, chart reviewed, case discussed with the Interdisciplinary Treatment Team. As per Nursing Report, pt has been calm, cooperative, better related, and compliant with medications.  Pt. seen individually, he is laying on his bed, dressed in hospital-provided clothing. Pt. reports to be feeling “good.” He has been eating and sleeping well.  Pt. denies SI/HI/AH/VH. No side effects to medications reported or observed. Pt. endorses no complaints at this time.

## 2020-03-04 VITALS
RESPIRATION RATE: 18 BRPM | OXYGEN SATURATION: 100 % | DIASTOLIC BLOOD PRESSURE: 83 MMHG | SYSTOLIC BLOOD PRESSURE: 132 MMHG | HEART RATE: 73 BPM

## 2020-03-04 PROCEDURE — 99238 HOSP IP/OBS DSCHRG MGMT 30/<: CPT

## 2020-03-04 RX ORDER — OLANZAPINE 15 MG/1
1 TABLET, FILM COATED ORAL
Qty: 30 | Refills: 0
Start: 2020-03-04

## 2020-03-04 RX ORDER — RISPERIDONE 4 MG/1
1 TABLET ORAL
Qty: 0 | Refills: 0 | DISCHARGE
Start: 2020-03-04

## 2020-03-04 RX ORDER — OLANZAPINE 15 MG/1
1 TABLET, FILM COATED ORAL
Qty: 0 | Refills: 0 | DISCHARGE
Start: 2020-03-04

## 2020-03-04 RX ADMIN — RISPERIDONE 1 MILLIGRAM(S): 4 TABLET ORAL at 09:55

## 2020-03-04 RX ADMIN — Medication 30 MILLILITER(S): at 07:10

## 2020-03-04 NOTE — PROGRESS NOTE BEHAVIORAL HEALTH - ABNORMAL MOVEMENTS
No abnormal movements

## 2020-03-04 NOTE — PROGRESS NOTE BEHAVIORAL HEALTH - NSBHADMITIPREASON_PSY_A_CORE
Danger to self; mental illness expected to respond to inpatient care/paranoid stable for discharge home/other reason

## 2020-03-04 NOTE — PROGRESS NOTE BEHAVIORAL HEALTH - RISK ASSESSMENT
Risk elements: history of schizophrenia; history of schizoaffective illness; no place to live (undomiciled); appears depressed; appears irritable; may have history of violence and impulsivity; history of divorce; unable to sleep; issues with rehabilitation program; not working or having regular daily routine; issues with compliance; difficulty understanding current situation; has made suicide attempt in past; came on own; walk-in; ; ; may be impulsive;   At time of admission suicide risk was HIGH.    Static: chronic psychiatric disorder; lack of support and housing; mood issues; mood episodes; past violence; interpersonal stress;; sleep issues; substance abuse/dependance; may be under financial stress; past psychiatric disorder; periods of confusion; past suicide attempts; possibly isolated; no known family history;     Modifiable: treatment of psychotic sxs; improve support and resources for housing; treat underlying mood issues; reduce aggressive potential with treatment; imptove interpersonal effectiveness;; improve sleep by addrressing mood  or anxiety issues; address substance dependency issues; help  focus on personal goals and structurd daily activities; address/enhance treatment compliance and supervision; improve awareness of situation to help pt undertand stressor; assess/treat underlying mood issues; improve interpersonal engagement via groups and therapy; treat possible impulsivity;     Protective: actively  seeking help;   Modifiable factors addressed in treatment plan; see summary and interval data for updates    Estimated length of stay based on admission data is 17 days. Estimated discharge date is: 02/04/20.
Risk elements: history of schizophrenia; history of schizoaffective illness; no place to live (undomiciled); appears depressed; appears irritable; may have history of violence and impulsivity; history of divorce; unable to sleep; issues with rehabilitation program; not working or having regular daily routine; issues with compliance; difficulty understanding current situation; has made suicide attempt in past; came on own; walk-in; ; ; may be impulsive;   At time of admission suicide risk was HIGH.    Static: chronic psychiatric disorder; lack of support and housing; mood issues; mood episodes; past violence; interpersonal stress;; sleep issues; substance abuse/dependance; may be under financial stress; past psychiatric disorder; periods of confusion; past suicide attempts; possibly isolated; no known family history;     Modifiable: treatment of psychotic sxs; improve support and resources for housing; treat underlying mood issues; reduce aggressive potential with treatment; imptove interpersonal effectiveness;; improve sleep by addrressing mood  or anxiety issues; address substance dependency issues; help  focus on personal goals and structurd daily activities; address/enhance treatment compliance and supervision; improve awareness of situation to help pt undertand stressor; assess/treat underlying mood issues; improve interpersonal engagement via groups and therapy; treat possible impulsivity;     Protective: actively  seeking help;   Modifiable factors addressed in treatment plan; see summary and interval data for updates    Estimated length of stay based on admission data is 17 days. Estimated discharge date is/was: 02/04/20.
Risk elements: history of schizophrenia; history of schizoaffective illness; no place to live (undomiciled); appears depressed; appears irritable; may have history of violence and impulsivity; history of divorce; unable to sleep; issues with rehabilitation program; not working or having regular daily routine; issues with compliance; difficulty understanding current situation; has made suicide attempt in past; came on own; walk-in; ; ; may be impulsive;   At time of admission suicide risk was HIGH.    Static: chronic psychiatric disorder; lack of support and housing; mood issues; mood episodes; past violence; interpersonal stress;; sleep issues; substance abuse/dependance; may be under financial stress; past psychiatric disorder; periods of confusion; past suicide attempts; possibly isolated; no known family history;     Modifiable: treatment of psychotic sxs; improve support and resources for housing; treat underlying mood issues; reduce aggressive potential with treatment; imptove interpersonal effectiveness;; improve sleep by addrressing mood  or anxiety issues; address substance dependency issues; help  focus on personal goals and structurd daily activities; address/enhance treatment compliance and supervision; improve awareness of situation to help pt undertand stressor; assess/treat underlying mood issues; improve interpersonal engagement via groups and therapy; treat possible impulsivity;     Protective: actively  seeking help;   Modifiable factors addressed in treatment plan; see summary and interval data for updates    Estimated length of stay based on admission data is 17 days. Estimated discharge date is/was: 02/04/20.
Risk elements: history of schizophrenia; history of schizoaffective illness; no place to live (undomiciled); appears depressed; appears irritable; may have history of violence and impulsivity; history of divorce; unable to sleep; issues with rehabilitation program; not working or having regular daily routine; issues with compliance; difficulty understanding current situation; has made suicide attempt in past; came on own; walk-in; ; ; may be impulsive;   At time of admission suicide risk was HIGH.    Static: chronic psychiatric disorder; lack of support and housing; mood issues; mood episodes; past violence; interpersonal stress;; sleep issues; substance abuse/dependance; may be under financial stress; past psychiatric disorder; periods of confusion; past suicide attempts; possibly isolated; no known family history;     Modifiable: treatment of psychotic sxs; improve support and resources for housing; treat underlying mood issues; reduce aggressive potential with treatment; imptove interpersonal effectiveness;; improve sleep by addrressing mood  or anxiety issues; address substance dependency issues; help  focus on personal goals and structurd daily activities; address/enhance treatment compliance and supervision; improve awareness of situation to help pt undertand stressor; assess/treat underlying mood issues; improve interpersonal engagement via groups and therapy; treat possible impulsivity;     Protective: actively  seeking help;   Modifiable factors addressed in treatment plan; see summary and interval data for updates    Estimated length of stay based on admission data is 17 days. Estimated discharge date is: 02/04/20.
Risk elements: history of schizophrenia; history of schizoaffective illness; no place to live (undomiciled); appears depressed; appears irritable; may have history of violence and impulsivity; history of divorce; unable to sleep; issues with rehabilitation program; not working or having regular daily routine; issues with compliance; difficulty understanding current situation; has made suicide attempt in past; came on own; walk-in; ; ; may be impulsive;   At time of admission suicide risk was HIGH.    Static: chronic psychiatric disorder; lack of support and housing; mood issues; mood episodes; past violence; interpersonal stress;; sleep issues; substance abuse/dependance; may be under financial stress; past psychiatric disorder; periods of confusion; past suicide attempts; possibly isolated; no known family history;     Modifiable: treatment of psychotic sxs; improve support and resources for housing; treat underlying mood issues; reduce aggressive potential with treatment; imptove interpersonal effectiveness;; improve sleep by addrressing mood  or anxiety issues; address substance dependency issues; help  focus on personal goals and structurd daily activities; address/enhance treatment compliance and supervision; improve awareness of situation to help pt undertand stressor; assess/treat underlying mood issues; improve interpersonal engagement via groups and therapy; treat possible impulsivity;     Protective: actively  seeking help;   Modifiable factors addressed in treatment plan; see summary and interval data for updates    Estimated length of stay based on admission data is 17 days. Estimated discharge date is: 02/04/20.
Risk elements: history of schizophrenia; history of schizoaffective illness; no place to live (undomiciled); appears depressed; appears irritable; may have history of violence and impulsivity; history of divorce; unable to sleep; issues with rehabilitation program; not working or having regular daily routine; issues with compliance; difficulty understanding current situation; has made suicide attempt in past; came on own; walk-in; ; ; may be impulsive;   At time of admission suicide risk was HIGH.    Static: chronic psychiatric disorder; lack of support and housing; mood issues; mood episodes; past violence; interpersonal stress;; sleep issues; substance abuse/dependance; may be under financial stress; past psychiatric disorder; periods of confusion; past suicide attempts; possibly isolated; no known family history;     Modifiable: treatment of psychotic sxs; improve support and resources for housing; treat underlying mood issues; reduce aggressive potential with treatment; imptove interpersonal effectiveness;; improve sleep by addrressing mood  or anxiety issues; address substance dependency issues; help  focus on personal goals and structurd daily activities; address/enhance treatment compliance and supervision; improve awareness of situation to help pt undertand stressor; assess/treat underlying mood issues; improve interpersonal engagement via groups and therapy; treat possible impulsivity;     Protective: actively  seeking help;   Modifiable factors addressed in treatment plan; see summary and interval data for updates    Estimated length of stay based on admission data is 17 days. Estimated discharge date is/was: 02/04/20.
Risk elements: history of schizophrenia; history of schizoaffective illness; no place to live (undomiciled); appears depressed; appears irritable; may have history of violence and impulsivity; history of divorce; unable to sleep; issues with rehabilitation program; not working or having regular daily routine; issues with compliance; difficulty understanding current situation; has made suicide attempt in past; came on own; walk-in; ; ; may be impulsive;   At time of admission suicide risk was HIGH.    Static: chronic psychiatric disorder; lack of support and housing; mood issues; mood episodes; past violence; interpersonal stress;; sleep issues; substance abuse/dependance; may be under financial stress; past psychiatric disorder; periods of confusion; past suicide attempts; possibly isolated; no known family history;     Modifiable: treatment of psychotic sxs; improve support and resources for housing; treat underlying mood issues; reduce aggressive potential with treatment; imptove interpersonal effectiveness;; improve sleep by addrressing mood  or anxiety issues; address substance dependency issues; help  focus on personal goals and structurd daily activities; address/enhance treatment compliance and supervision; improve awareness of situation to help pt undertand stressor; assess/treat underlying mood issues; improve interpersonal engagement via groups and therapy; treat possible impulsivity;     Protective: actively  seeking help;   Modifiable factors addressed in treatment plan; see summary and interval data for updates    Estimated length of stay based on admission data is 17 days. Estimated discharge date is/was: 02/04/20.
Risk elements: history of schizophrenia; history of schizoaffective illness; no place to live (undomiciled); appears depressed; appears irritable; may have history of violence and impulsivity; history of divorce; unable to sleep; issues with rehabilitation program; not working or having regular daily routine; issues with compliance; difficulty understanding current situation; has made suicide attempt in past; came on own; walk-in; ; ; may be impulsive;   At time of admission suicide risk was HIGH.    Static: chronic psychiatric disorder; lack of support and housing; mood issues; mood episodes; past violence; interpersonal stress;; sleep issues; substance abuse/dependance; may be under financial stress; past psychiatric disorder; periods of confusion; past suicide attempts; possibly isolated; no known family history;     Modifiable: treatment of psychotic sxs; improve support and resources for housing; treat underlying mood issues; reduce aggressive potential with treatment; imptove interpersonal effectiveness;; improve sleep by addrressing mood  or anxiety issues; address substance dependency issues; help  focus on personal goals and structurd daily activities; address/enhance treatment compliance and supervision; improve awareness of situation to help pt undertand stressor; assess/treat underlying mood issues; improve interpersonal engagement via groups and therapy; treat possible impulsivity;     Protective: actively  seeking help;   Modifiable factors addressed in treatment plan; see summary and interval data for updates    Estimated length of stay based on admission data is 17 days. Estimated discharge date is: 02/04/20.
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
## 2020-03-04 NOTE — PROGRESS NOTE BEHAVIORAL HEALTH - [facility] ED [facility] MSE SPEECH RATE
Normal

## 2020-03-04 NOTE — PROGRESS NOTE BEHAVIORAL HEALTH - ESTIMATED INTELLIGENCE
[id number] 
Below Average

## 2020-03-04 NOTE — PROGRESS NOTE BEHAVIORAL HEALTH - RECENT MEMORY
Impaired
Impaired
Normal
Impaired
Normal
Impaired
Normal

## 2020-03-04 NOTE — PROGRESS NOTE BEHAVIORAL HEALTH - NSBHADMITDANGERSELF_PSY_A_CORE
unable to care for self

## 2020-03-04 NOTE — PROGRESS NOTE BEHAVIORAL HEALTH - THOUGHT PROCESS
Disorganized/Impaired reasoning
Linear
Disorganized/Impaired reasoning
Impaired reasoning/Disorganized
Disorganized/Impaired reasoning
Linear
Disorganized/Impaired reasoning

## 2020-03-04 NOTE — PROGRESS NOTE BEHAVIORAL HEALTH - NSBHFUPSUICINTERVAL_PSY_A_CORE
none known

## 2020-03-04 NOTE — PROGRESS NOTE BEHAVIORAL HEALTH - NSBHFUPTYPE_PSY_A_CORE
Inpatient

## 2020-03-04 NOTE — PROGRESS NOTE BEHAVIORAL HEALTH - NSBHADMITMEDEDU_PSY_A_CORE
yes...

## 2020-03-04 NOTE — PROGRESS NOTE BEHAVIORAL HEALTH - NS ED BHA MSE GENERAL APPEARANCE
Well developed/No deformities present
Well developed/No deformities present
No deformities present/Well developed
Well developed/No deformities present
No deformities present/Well developed
Well developed/No deformities present
Well developed/No deformities present

## 2020-03-04 NOTE — PROGRESS NOTE BEHAVIORAL HEALTH - GAIT / STATION
Other
Other
Normal gait / station
Other
Normal gait / station

## 2020-03-04 NOTE — PROGRESS NOTE BEHAVIORAL HEALTH - NS ED [facility] REVIEW OF ED CHART AVAILABLE INVESTIGATIONS REVIEWED
Yes

## 2020-03-04 NOTE — PROGRESS NOTE BEHAVIORAL HEALTH - PRIMARY DX
Paranoid schizophrenia

## 2020-03-04 NOTE — PROGRESS NOTE BEHAVIORAL HEALTH - EYE CONTACT
Poor
Poor
Fair
Poor
Fair
Good
Good
Fair
Good

## 2020-03-04 NOTE — PROGRESS NOTE BEHAVIORAL HEALTH - CASE SUMMARY
Case discussed with interdisciplinary team.  Chart reviewed.  Per staff, pt is pleasant, cooperative, feels ready for discharge.  He feels ready to go, has been visible on the unit, social with peers, He reports that his mood has improved, he denies SI/HI/AH/VH.  He has been compliant with meds, denies side effects. Good sleep and + appetite.  Attending groups.  Appropriate, euthymic affect.  Psychoeducation provided re: risks associated with substance abuse and the importance of treatment compliance.  Patient is not interested in going to substance abuse rehab at this time.  Currently at low acute risk of harm to self/others and stable for discharge home today with outpatient follow up.
39 m with paranoid schizophrenia.  case discussed w interdisciplinary team  cross cover note.  per staff, pt is compliant with disintegrating meds, constricted, guarded, oddly related, slept last night.  This morning, pt is focused on being ready to be discharged.  denies med side effects.  denies si/hi/ah/vh.  will inc risperdal mtab to 5mg qhs.

## 2020-03-04 NOTE — PROGRESS NOTE BEHAVIORAL HEALTH - LANGUAGE
No abnormalities noted

## 2020-03-04 NOTE — PROGRESS NOTE BEHAVIORAL HEALTH - AFFECT CONGRUENCE
Congruent

## 2020-03-04 NOTE — PROGRESS NOTE BEHAVIORAL HEALTH - GROOMING
Good

## 2020-03-04 NOTE — PROGRESS NOTE BEHAVIORAL HEALTH - PROBLEM SELECTOR PLAN 1
- will decrease Risperdal to 1mg qdaily as part of cross titration to Zyprexa.
- will decrease Risperdal to 2mg qdaily as part of cross titration to Zyprexa.
-D/c with oral Zyprexa to f/u with outpatient
Risperdal for AH and paranoid thinking see summary for updates
Risperdal for AH and paranoid thinking see summary for updates now being cross-tapered over to Zyprexa for efficacy reasons
Risperdal for AH and paranoid thinking see summary for updates
Risperdal for AH and paranoid thinking see summary for updates
Risperdal for AH and paranoid [place] see summary for updates
Risperdal for AH and paranoid thinking see summary for updates

## 2020-03-04 NOTE — PROGRESS NOTE BEHAVIORAL HEALTH - BODY HABITUS
Well nourished

## 2020-03-04 NOTE — PROGRESS NOTE BEHAVIORAL HEALTH - AFFECT QUALITY
Anxious
Irritable
Anxious
Euthymic
Euthymic
Irritable
Anxious
Euthymic
Irritable

## 2020-03-04 NOTE — PROGRESS NOTE BEHAVIORAL HEALTH - NSBHCHARTREVIEWVS_PSY_A_CORE FT
Vital Signs Last 24 Hrs  T(C): 36.9 (18 Jan 2020 14:41), Max: 36.9 (18 Jan 2020 14:41)  T(F): 98.5 (18 Jan 2020 14:41), Max: 98.5 (18 Jan 2020 14:41)  HR: 66 (18 Jan 2020 14:41) (66 - 82)  BP: 109/72 (18 Jan 2020 14:41) (109/72 - 115/78)  BP(mean): --  RR: 18 (18 Jan 2020 14:41) (18 - 18)  SpO2: 99% (18 Jan 2020 14:41) (99% - 100%)
Vital Signs Last 24 Hrs  T(C): 36.8 (11 Feb 2020 10:00), Max: 36.8 (11 Feb 2020 10:00)  T(F): 98.2 (11 Feb 2020 10:00), Max: 98.2 (11 Feb 2020 10:00)  HR: 87 (11 Feb 2020 10:00) (87 - 88)  BP: 116/82 (11 Feb 2020 10:00) (110/71 - 116/82)  BP(mean): --  RR: 20 (11 Feb 2020 10:00) (18 - 20)  SpO2: 98% (11 Feb 2020 10:00) (97% - 98%)
Vital Signs Last 24 Hrs  T(C): --  T(F): --  HR: 95 (17 Feb 2020 16:54) (95 - 95)  BP: 115/77 (17 Feb 2020 16:54) (115/77 - 115/77)  BP(mean): --  RR: 18 (17 Feb 2020 16:54) (18 - 18)  SpO2: 97% (17 Feb 2020 16:54) (97% - 97%)
Vital Signs Last 24 Hrs  T(C): --  T(F): --  HR: 73 (04 Mar 2020 08:30) (73 - 73)  BP: 132/83 (04 Mar 2020 08:30) (132/83 - 132/83)  BP(mean): --  RR: 18 (04 Mar 2020 08:30) (18 - 18)  SpO2: 100% (04 Mar 2020 08:30) (100% - 100%)
Vital Signs Last 24 Hrs  T(C): --  T(F): --  HR: 83 (02 Mar 2020 08:45) (83 - 104)  BP: 123/83 (02 Mar 2020 08:45) (123/83 - 131/81)  BP(mean): --  RR: 18 (02 Mar 2020 08:45) (18 - 18)  SpO2: 97% (01 Mar 2020 16:47) (97% - 97%)
Vital Signs Last 24 Hrs  T(C): --  T(F): --  HR: 84 (03 Mar 2020 09:11) (84 - 101)  BP: 135/85 (03 Mar 2020 09:11) (130/85 - 135/85)  BP(mean): --  RR: 20 (03 Mar 2020 09:11) (18 - 20)  SpO2: 99% (03 Mar 2020 09:11) (97% - 99%)
Vital Signs Last 24 Hrs  T(C): --  T(F): --  HR: 103 (18 Feb 2020 13:35) (91 - 103)  BP: 124/78 (18 Feb 2020 13:35) (108/69 - 124/78)  BP(mean): --  RR: 20 (18 Feb 2020 13:35) (20 - 20)  SpO2: 97% (18 Feb 2020 13:35) (97% - 97%)
Vital Signs Last 24 Hrs  T(C): --  T(F): --  HR: 81 (20 Feb 2020 09:34) (81 - 81)  BP: 110/74 (20 Feb 2020 09:34) (110/74 - 110/74)  BP(mean): --  RR: --  SpO2: --
Vital Signs Last 24 Hrs  T(C): --  T(F): --  HR: 84 (25 Feb 2020 16:46) (84 - 93)  BP: 129/83 (25 Feb 2020 16:46) (111/78 - 129/83)  BP(mean): --  RR: 18 (25 Feb 2020 16:46) (18 - 18)  SpO2: 97% (25 Feb 2020 16:46) (97% - 100%)
Vital Signs Last 24 Hrs  T(C): --  T(F): --  HR: 94 (27 Feb 2020 16:57) (94 - 94)  BP: 119/77 (27 Feb 2020 16:57) (119/77 - 119/77)  BP(mean): --  RR: 18 (27 Feb 2020 16:57) (18 - 18)  SpO2: 97% (27 Feb 2020 16:57) (97% - 97%)
Vital Signs Last 24 Hrs  T(C): --  T(F): --  HR: 97 (23 Feb 2020 16:45) (85 - 97)  BP: 119/79 (23 Feb 2020 16:45) (119/79 - 123/78)  BP(mean): --  RR: 20 (23 Feb 2020 16:45) (20 - 20)  SpO2: 97% (23 Feb 2020 16:45) (97% - 97%)
Vital Signs Last 24 Hrs  T(C): 36.7 (12 Feb 2020 10:37), Max: 36.7 (12 Feb 2020 10:37)  T(F): 98 (12 Feb 2020 10:37), Max: 98 (12 Feb 2020 10:37)  HR: 72 (12 Feb 2020 10:37) (72 - 72)  BP: 120/79 (12 Feb 2020 10:37) (120/79 - 120/79)  BP(mean): --  RR: 17 (12 Feb 2020 10:37) (17 - 17)  SpO2: 98% (12 Feb 2020 10:37) (98% - 98%)
Vital Signs Last 24 Hrs  T(C): 36.9 (11 Feb 2020 16:59), Max: 36.9 (11 Feb 2020 16:59)  T(F): 98.4 (11 Feb 2020 16:59), Max: 98.4 (11 Feb 2020 16:59)  HR: 86 (11 Feb 2020 16:59) (86 - 87)  BP: 118/77 (11 Feb 2020 16:59) (116/82 - 118/77)  BP(mean): --  RR: 18 (11 Feb 2020 16:59) (18 - 20)  SpO2: 98% (11 Feb 2020 16:59) (98% - 98%)
Vital Signs Last 24 Hrs  T(C): 36.9 (19 Feb 2020 16:48), Max: 36.9 (19 Feb 2020 16:48)  T(F): 98.4 (19 Feb 2020 16:48), Max: 98.4 (19 Feb 2020 16:48)  HR: 98 (19 Feb 2020 16:48) (79 - 98)  BP: 120/71 (19 Feb 2020 16:48) (110/80 - 120/71)  BP(mean): --  RR: 18 (19 Feb 2020 16:48) (18 - 20)  SpO2: 96% (19 Feb 2020 16:48) (96% - 100%)
Vital Signs Last 24 Hrs  T(C): 37.1 (13 Feb 2020 10:00), Max: 37.1 (13 Feb 2020 10:00)  T(F): 98.8 (13 Feb 2020 10:00), Max: 98.8 (13 Feb 2020 10:00)  HR: 88 (13 Feb 2020 15:30) (75 - 88)  BP: 127/81 (13 Feb 2020 15:30) (120/79 - 127/81)  BP(mean): --  RR: 18 (13 Feb 2020 15:30) (18 - 20)  SpO2: 98% (13 Feb 2020 15:30) (98% - 98%)
Vital Signs Last 24 Hrs  T(C): --  T(F): --  HR: 84 (25 Feb 2020 16:46) (84 - 93)  BP: 129/83 (25 Feb 2020 16:46) (111/78 - 129/83)  BP(mean): --  RR: 18 (25 Feb 2020 16:46) (18 - 18)  SpO2: 97% (25 Feb 2020 16:46) (97% - 100%)
Vital Signs Last 24 Hrs  T(C): --  T(F): --  HR: 97 (23 Feb 2020 16:45) (85 - 97)  BP: 119/79 (23 Feb 2020 16:45) (119/79 - 123/78)  BP(mean): --  RR: 20 (23 Feb 2020 16:45) (20 - 20)  SpO2: 97% (23 Feb 2020 16:45) (97% - 97%)
Vital Signs Last 24 Hrs  T(C): --  T(F): --  HR: 81 (05 Feb 2020 09:00) (81 - 81)  BP: 111/77 (05 Feb 2020 09:00) (111/77 - 111/77)  BP(mean): --  RR: 18 (05 Feb 2020 09:00) (18 - 18)  SpO2: 96% (05 Feb 2020 09:00) (96% - 96%)
Vital Signs Last 24 Hrs  T(C): 36.5 (06 Feb 2020 09:21), Max: 36.5 (06 Feb 2020 09:21)  T(F): 97.7 (06 Feb 2020 09:21), Max: 97.7 (06 Feb 2020 09:21)  HR: 108 (06 Feb 2020 17:06) (83 - 108)  BP: 112/80 (06 Feb 2020 17:06) (112/80 - 116/70)  BP(mean): --  RR: 19 (06 Feb 2020 17:06) (19 - 20)  SpO2: 98% (06 Feb 2020 17:06) (98% - 98%)
Vital Signs Last 24 Hrs  T(C): --  T(F): --  HR: --  BP: --  BP(mean): --  RR: --  SpO2: --
Vital Signs Last 24 Hrs  T(C): --  T(F): --  HR: 112 (29 Jan 2020 16:34) (112 - 112)  BP: 130/80 (29 Jan 2020 16:34) (130/80 - 130/80)  BP(mean): --  RR: 17 (29 Jan 2020 16:34) (17 - 17)  SpO2: --
Vital Signs Last 24 Hrs  T(C): --  T(F): --  HR: 93 (03 Feb 2020 17:08) (93 - 93)  BP: 121/76 (03 Feb 2020 17:08) (121/76 - 121/76)  BP(mean): --  RR: 18 (03 Feb 2020 17:08) (18 - 18)  SpO2: 100% (03 Feb 2020 17:08) (100% - 100%)
Vital Signs Last 24 Hrs  T(C): 36.4 (04 Feb 2020 09:57), Max: 36.4 (04 Feb 2020 09:57)  T(F): 97.6 (04 Feb 2020 09:57), Max: 97.6 (04 Feb 2020 09:57)  HR: 86 (04 Feb 2020 09:57) (86 - 86)  BP: 124/73 (04 Feb 2020 09:57) (124/73 - 124/73)  BP(mean): --  RR: 20 (04 Feb 2020 09:57) (20 - 20)  SpO2: 98% (04 Feb 2020 09:57) (98% - 98%)
Vital Signs Last 24 Hrs  T(C): 36.6 (02 Feb 2020 16:54), Max: 36.8 (02 Feb 2020 09:00)  T(F): 97.8 (02 Feb 2020 16:54), Max: 98.3 (02 Feb 2020 09:00)  HR: 75 (02 Feb 2020 16:54) (75 - 80)  BP: 110/85 (02 Feb 2020 16:54) (110/85 - 117/76)  BP(mean): --  RR: 18 (02 Feb 2020 16:54) (18 - 18)  SpO2: 97% (02 Feb 2020 16:54) (97% - 99%)
Vital Signs Last 24 Hrs  T(C): 36.6 (22 Jan 2020 16:30), Max: 36.6 (22 Jan 2020 16:30)  T(F): 97.8 (22 Jan 2020 16:30), Max: 97.8 (22 Jan 2020 16:30)  HR: 65 (22 Jan 2020 16:30) (65 - 65)  BP: 127/89 (22 Jan 2020 16:30) (127/89 - 127/89)  BP(mean): --  RR: 18 (22 Jan 2020 16:30) (18 - 18)  SpO2: 98% (22 Jan 2020 16:30) (98% - 98%)
Vital Signs Last 24 Hrs  T(C): 36.7 (28 Jan 2020 09:39), Max: 36.7 (27 Jan 2020 16:54)  T(F): 98 (28 Jan 2020 09:39), Max: 98.1 (27 Jan 2020 16:54)  HR: 85 (28 Jan 2020 09:39) (85 - 85)  BP: 123/83 (28 Jan 2020 09:39) (118/85 - 123/83)  BP(mean): --  RR: 20 (28 Jan 2020 09:39) (18 - 20)  SpO2: 99% (28 Jan 2020 09:39) (98% - 99%)
Vital Signs Last 24 Hrs  T(C): 36.8 (30 Jan 2020 16:27), Max: 36.8 (30 Jan 2020 16:27)  T(F): 98.3 (30 Jan 2020 16:27), Max: 98.3 (30 Jan 2020 16:27)  HR: 88 (30 Jan 2020 16:27) (88 - 88)  BP: 129/83 (30 Jan 2020 16:27) (129/83 - 129/83)  BP(mean): --  RR: 18 (30 Jan 2020 16:27) (18 - 18)  SpO2: 98% (30 Jan 2020 16:27) (98% - 98%)
Vital Signs Last 24 Hrs  T(C): 37.1 (29 Jan 2020 09:00), Max: 37.1 (29 Jan 2020 09:00)  T(F): 98.7 (29 Jan 2020 09:00), Max: 98.7 (29 Jan 2020 09:00)  HR: 82 (29 Jan 2020 09:00) (82 - 82)  BP: 111/71 (29 Jan 2020 09:00) (111/71 - 111/71)  BP(mean): --  RR: 18 (29 Jan 2020 09:00) (18 - 18)  SpO2: 98% (29 Jan 2020 09:00) (98% - 98%)
Vital Signs Last 24 Hrs  T(C): 36.7 (09 Feb 2020 16:43), Max: 36.7 (09 Feb 2020 16:43)  T(F): 98.1 (09 Feb 2020 16:43), Max: 98.1 (09 Feb 2020 16:43)  HR: 84 (10 Feb 2020 09:00) (84 - 85)  BP: 114/76 (10 Feb 2020 09:00) (106/73 - 114/76)  BP(mean): --  RR: 18 (10 Feb 2020 09:00) (18 - 18)  SpO2: 100% (10 Feb 2020 09:00) (97% - 100%)
Vital Signs Last 24 Hrs  T(C): 36.7 (27 Jan 2020 16:54), Max: 37 (27 Jan 2020 09:27)  T(F): 98.1 (27 Jan 2020 16:54), Max: 98.6 (27 Jan 2020 09:27)  HR: 85 (27 Jan 2020 16:54) (85 - 87)  BP: 118/85 (27 Jan 2020 16:54) (118/85 - 121/72)  BP(mean): --  RR: 18 (27 Jan 2020 16:54) (18 - 20)  SpO2: 98% (27 Jan 2020 16:54) (95% - 98%)
Vital Signs Last 24 Hrs  T(C): 36.9 (18 Jan 2020 14:41), Max: 36.9 (18 Jan 2020 14:41)  T(F): 98.5 (18 Jan 2020 14:41), Max: 98.5 (18 Jan 2020 14:41)  HR: 66 (18 Jan 2020 14:41) (66 - 82)  BP: 109/72 (18 Jan 2020 14:41) (109/72 - 115/78)  BP(mean): --  RR: 18 (18 Jan 2020 14:41) (18 - 18)  SpO2: 99% (18 Jan 2020 14:41) (99% - 100%)
Vital Signs Last 24 Hrs  T(C): 36.6 (22 Jan 2020 16:30), Max: 36.6 (22 Jan 2020 16:30)  T(F): 97.8 (22 Jan 2020 16:30), Max: 97.8 (22 Jan 2020 16:30)  HR: 65 (22 Jan 2020 16:30) (65 - 65)  BP: 127/89 (22 Jan 2020 16:30) (127/89 - 127/89)  BP(mean): --  RR: 18 (22 Jan 2020 16:30) (18 - 18)  SpO2: 98% (22 Jan 2020 16:30) (98% - 98%)

## 2020-03-04 NOTE — PROGRESS NOTE BEHAVIORAL HEALTH - MUSCLE TONE / STRENGTH
Normal muscle tone/strength

## 2020-03-04 NOTE — PROGRESS NOTE BEHAVIORAL HEALTH - NSBHCHARTREVIEWLAB_PSY_A_CORE FT
Nutrition Services
14.3   7.48  )-----------( 263      ( 18 Jan 2020 00:38 )             43.0   01-18    137  |  100  |  17  ----------------------------<  82  4.2   |  26  |  0.83    Ca    9.2      18 Jan 2020 00:38    TPro  7.5  /  Alb  4.1  /  TBili  0.6  /  DBili  x   /  AST  52<H>  /  ALT  44  /  AlkPhos  64  01-18

## 2020-03-04 NOTE — PROGRESS NOTE BEHAVIORAL HEALTH - NSBHFUPINTERVALHXFT_PSY_A_CORE
Pt. seen, chart reviewed, case discussed with the Interdisciplinary Treatment Team. Pt has been calm and ready to go back home. He plans to go back to live with his mother and help her treat her diabetes. He also plans to share with her which medication he is taking, as it is helping him to feel "clearer and more stable". No SI/HI, no paranoia. He is aware of his appt and planning to f/u with the psychiatrist. No other issues voiced

## 2020-03-04 NOTE — PROGRESS NOTE BEHAVIORAL HEALTH - AXIS III
hx of benign neck tumor

## 2020-03-04 NOTE — PROGRESS NOTE BEHAVIORAL HEALTH - NS ED BHA REVIEW OF ED CHART AVAILABLE IMAGING REVIEWED
None available

## 2020-03-04 NOTE — PROGRESS NOTE BEHAVIORAL HEALTH - NSBHCONSORIP_PSY_A_CORE
Inpatient Admission...

## 2020-03-04 NOTE — PROGRESS NOTE BEHAVIORAL HEALTH - AFFECT RANGE
Blunted
Constricted
Blunted
Constricted
Full
Full
Constricted
Full
Blunted

## 2020-03-04 NOTE — PROGRESS NOTE BEHAVIORAL HEALTH - NSBHFUPINTERVALCCFT_PSY_A_CORE
"I'm good to go"
"I'm good."
"I'm good."
no clear statements
discharged focused
no comments.
discharged focused;  later in the day complained of constipation.
no comments.
no comments.
upaset about dose increase
wears a mask to prevent infeciton; wants Zyprexa
willing to take more Zyprexa
willing to take more Zyprexa
"You are not my doctor; I'm in a hotel"
denies any problems ; but seems guarded
denies any problems ; but seems guarded
willing to switch to Risperdal; expresses paranoid thinking see summary
likes meltable meds
now focused on leaving.
denies any problems ; but seems guarded
disorganized statements
likes meltable meds
talks about sleep medication  ;
talks about sleep medication  ;
willing to switch to Risperdal; expresses paranoid thinking see summary
willing to switch to Risperdal; expresses paranoid thinking see summary
"I'm ok"
talks about sleep medication  ;

## 2020-03-04 NOTE — PROGRESS NOTE BEHAVIORAL HEALTH - NS ED BHA MED ROS PSYCHIATRIC
See HPI

## 2020-03-04 NOTE — PROGRESS NOTE BEHAVIORAL HEALTH - THOUGHT ASSOCIATIONS
Loose
Normal
Loose
Normal
Loose

## 2020-03-04 NOTE — PROGRESS NOTE BEHAVIORAL HEALTH - NS ED BHA MED ROS NEUROLOGICAL
No complaints

## 2020-03-04 NOTE — PROGRESS NOTE BEHAVIORAL HEALTH - NSBHLEGALSTATUS_PSY_A_CORE
9.13 (Voluntary)

## 2020-03-04 NOTE — PROGRESS NOTE BEHAVIORAL HEALTH - PROBLEM SELECTOR PROBLEM 1
Paranoid schizophrenia

## 2020-03-04 NOTE — PROGRESS NOTE BEHAVIORAL HEALTH - NSBHADMITIPOBS_PSY_A_CORE
Routine observation

## 2020-03-04 NOTE — PROGRESS NOTE BEHAVIORAL HEALTH - NSBHADMITIPBHPROVIDER_PSY_A_CORE
N/A

## 2020-03-08 DIAGNOSIS — F20.0 PARANOID SCHIZOPHRENIA: ICD-10-CM

## 2020-03-08 DIAGNOSIS — F32.9 MAJOR DEPRESSIVE DISORDER, SINGLE EPISODE, UNSPECIFIED: ICD-10-CM

## 2020-03-08 DIAGNOSIS — R59.9 ENLARGED LYMPH NODES, UNSPECIFIED: ICD-10-CM

## 2020-03-08 DIAGNOSIS — Z59.0 HOMELESSNESS: ICD-10-CM

## 2020-03-08 SDOH — ECONOMIC STABILITY - HOUSING INSECURITY: HOMELESSNESS: Z59.0

## 2020-04-24 PROCEDURE — 83036 HEMOGLOBIN GLYCOSYLATED A1C: CPT

## 2020-04-24 PROCEDURE — 87086 URINE CULTURE/COLONY COUNT: CPT

## 2020-04-24 PROCEDURE — 70450 CT HEAD/BRAIN W/O DYE: CPT

## 2020-04-24 PROCEDURE — 93005 ELECTROCARDIOGRAM TRACING: CPT

## 2020-04-24 PROCEDURE — 80053 COMPREHEN METABOLIC PANEL: CPT

## 2020-04-24 PROCEDURE — 80307 DRUG TEST PRSMV CHEM ANLYZR: CPT

## 2020-04-24 PROCEDURE — 81003 URINALYSIS AUTO W/O SCOPE: CPT

## 2020-04-24 PROCEDURE — 85025 COMPLETE CBC W/AUTO DIFF WBC: CPT

## 2020-04-24 PROCEDURE — 99285 EMERGENCY DEPT VISIT HI MDM: CPT | Mod: 25

## 2020-04-24 PROCEDURE — 80061 LIPID PANEL: CPT

## 2020-04-24 PROCEDURE — 36415 COLL VENOUS BLD VENIPUNCTURE: CPT

## 2020-04-24 PROCEDURE — 71046 X-RAY EXAM CHEST 2 VIEWS: CPT

## 2020-04-24 PROCEDURE — 84443 ASSAY THYROID STIM HORMONE: CPT

## 2020-10-11 ENCOUNTER — EMERGENCY (EMERGENCY)
Facility: HOSPITAL | Age: 40
LOS: 1 days | Discharge: HOME | End: 2020-10-11
Attending: EMERGENCY MEDICINE | Admitting: EMERGENCY MEDICINE
Payer: MEDICARE

## 2020-10-11 VITALS
HEIGHT: 66 IN | DIASTOLIC BLOOD PRESSURE: 89 MMHG | TEMPERATURE: 100 F | RESPIRATION RATE: 18 BRPM | HEART RATE: 75 BPM | SYSTOLIC BLOOD PRESSURE: 165 MMHG | OXYGEN SATURATION: 100 %

## 2020-10-11 PROBLEM — R59.1 GENERALIZED ENLARGED LYMPH NODES: Chronic | Status: ACTIVE | Noted: 2020-01-18

## 2020-10-11 LAB
ALBUMIN SERPL ELPH-MCNC: 4.3 G/DL — SIGNIFICANT CHANGE UP (ref 3.3–5)
ALP SERPL-CCNC: 57 U/L — SIGNIFICANT CHANGE UP (ref 40–120)
ALT FLD-CCNC: 63 U/L — HIGH (ref 10–45)
ANION GAP SERPL CALC-SCNC: 13 MMOL/L — SIGNIFICANT CHANGE UP (ref 5–17)
APAP SERPL-MCNC: <5 UG/ML — LOW (ref 10–30)
APTT BLD: 29.4 SEC — SIGNIFICANT CHANGE UP (ref 27.5–35.5)
AST SERPL-CCNC: 44 U/L — HIGH (ref 10–40)
BASOPHILS # BLD AUTO: 0.03 K/UL — SIGNIFICANT CHANGE UP (ref 0–0.2)
BASOPHILS NFR BLD AUTO: 0.5 % — SIGNIFICANT CHANGE UP (ref 0–2)
BILIRUB SERPL-MCNC: 0.4 MG/DL — SIGNIFICANT CHANGE UP (ref 0.2–1.2)
BUN SERPL-MCNC: 16 MG/DL — SIGNIFICANT CHANGE UP (ref 7–23)
CALCIUM SERPL-MCNC: 9.3 MG/DL — SIGNIFICANT CHANGE UP (ref 8.4–10.5)
CHLORIDE SERPL-SCNC: 101 MMOL/L — SIGNIFICANT CHANGE UP (ref 96–108)
CO2 SERPL-SCNC: 27 MMOL/L — SIGNIFICANT CHANGE UP (ref 22–31)
CREAT SERPL-MCNC: 0.89 MG/DL — SIGNIFICANT CHANGE UP (ref 0.5–1.3)
EOSINOPHIL # BLD AUTO: 0.22 K/UL — SIGNIFICANT CHANGE UP (ref 0–0.5)
EOSINOPHIL NFR BLD AUTO: 3.4 % — SIGNIFICANT CHANGE UP (ref 0–6)
ETHANOL SERPL-MCNC: <10 MG/DL — SIGNIFICANT CHANGE UP (ref 0–10)
GLUCOSE SERPL-MCNC: 104 MG/DL — HIGH (ref 70–99)
HCT VFR BLD CALC: 38.6 % — LOW (ref 39–50)
HGB BLD-MCNC: 12.8 G/DL — LOW (ref 13–17)
IMM GRANULOCYTES NFR BLD AUTO: 0.3 % — SIGNIFICANT CHANGE UP (ref 0–1.5)
INR BLD: 0.98 — SIGNIFICANT CHANGE UP (ref 0.88–1.16)
LYMPHOCYTES # BLD AUTO: 1.97 K/UL — SIGNIFICANT CHANGE UP (ref 1–3.3)
LYMPHOCYTES # BLD AUTO: 30.4 % — SIGNIFICANT CHANGE UP (ref 13–44)
MAGNESIUM SERPL-MCNC: 2.2 MG/DL — SIGNIFICANT CHANGE UP (ref 1.6–2.6)
MCHC RBC-ENTMCNC: 26.2 PG — LOW (ref 27–34)
MCHC RBC-ENTMCNC: 33.2 GM/DL — SIGNIFICANT CHANGE UP (ref 32–36)
MCV RBC AUTO: 78.9 FL — LOW (ref 80–100)
MONOCYTES # BLD AUTO: 0.65 K/UL — SIGNIFICANT CHANGE UP (ref 0–0.9)
MONOCYTES NFR BLD AUTO: 10 % — SIGNIFICANT CHANGE UP (ref 2–14)
NEUTROPHILS # BLD AUTO: 3.58 K/UL — SIGNIFICANT CHANGE UP (ref 1.8–7.4)
NEUTROPHILS NFR BLD AUTO: 55.4 % — SIGNIFICANT CHANGE UP (ref 43–77)
NRBC # BLD: 0 /100 WBCS — SIGNIFICANT CHANGE UP (ref 0–0)
PLATELET # BLD AUTO: 249 K/UL — SIGNIFICANT CHANGE UP (ref 150–400)
POTASSIUM SERPL-MCNC: 3.9 MMOL/L — SIGNIFICANT CHANGE UP (ref 3.5–5.3)
POTASSIUM SERPL-SCNC: 3.9 MMOL/L — SIGNIFICANT CHANGE UP (ref 3.5–5.3)
PROT SERPL-MCNC: 7.5 G/DL — SIGNIFICANT CHANGE UP (ref 6–8.3)
PROTHROM AB SERPL-ACNC: 11.8 SEC — SIGNIFICANT CHANGE UP (ref 10.6–13.6)
RBC # BLD: 4.89 M/UL — SIGNIFICANT CHANGE UP (ref 4.2–5.8)
RBC # FLD: 15.6 % — HIGH (ref 10.3–14.5)
SALICYLATES SERPL-MCNC: <0.3 MG/DL — LOW (ref 2.8–20)
SARS-COV-2 RNA SPEC QL NAA+PROBE: SIGNIFICANT CHANGE UP
SODIUM SERPL-SCNC: 141 MMOL/L — SIGNIFICANT CHANGE UP (ref 135–145)
WBC # BLD: 6.47 K/UL — SIGNIFICANT CHANGE UP (ref 3.8–10.5)
WBC # FLD AUTO: 6.47 K/UL — SIGNIFICANT CHANGE UP (ref 3.8–10.5)

## 2020-10-11 PROCEDURE — 36415 COLL VENOUS BLD VENIPUNCTURE: CPT

## 2020-10-11 PROCEDURE — 80307 DRUG TEST PRSMV CHEM ANLYZR: CPT

## 2020-10-11 PROCEDURE — 99284 EMERGENCY DEPT VISIT MOD MDM: CPT | Mod: 25

## 2020-10-11 PROCEDURE — 72125 CT NECK SPINE W/O DYE: CPT

## 2020-10-11 PROCEDURE — 99285 EMERGENCY DEPT VISIT HI MDM: CPT | Mod: CS,25

## 2020-10-11 PROCEDURE — 80053 COMPREHEN METABOLIC PANEL: CPT

## 2020-10-11 PROCEDURE — U0003: CPT

## 2020-10-11 PROCEDURE — 85730 THROMBOPLASTIN TIME PARTIAL: CPT

## 2020-10-11 PROCEDURE — 85610 PROTHROMBIN TIME: CPT

## 2020-10-11 PROCEDURE — 86901 BLOOD TYPING SEROLOGIC RH(D): CPT

## 2020-10-11 PROCEDURE — 70450 CT HEAD/BRAIN W/O DYE: CPT | Mod: 26

## 2020-10-11 PROCEDURE — 81003 URINALYSIS AUTO W/O SCOPE: CPT

## 2020-10-11 PROCEDURE — 72125 CT NECK SPINE W/O DYE: CPT | Mod: 26

## 2020-10-11 PROCEDURE — 85025 COMPLETE CBC W/AUTO DIFF WBC: CPT

## 2020-10-11 PROCEDURE — 86850 RBC ANTIBODY SCREEN: CPT

## 2020-10-11 PROCEDURE — 83735 ASSAY OF MAGNESIUM: CPT

## 2020-10-11 PROCEDURE — 70450 CT HEAD/BRAIN W/O DYE: CPT

## 2020-10-11 PROCEDURE — 71045 X-RAY EXAM CHEST 1 VIEW: CPT

## 2020-10-11 PROCEDURE — 71045 X-RAY EXAM CHEST 1 VIEW: CPT | Mod: 26

## 2020-10-11 PROCEDURE — 86900 BLOOD TYPING SEROLOGIC ABO: CPT

## 2020-10-11 RX ORDER — SODIUM CHLORIDE 9 MG/ML
1000 INJECTION INTRAMUSCULAR; INTRAVENOUS; SUBCUTANEOUS ONCE
Refills: 0 | Status: COMPLETED | OUTPATIENT
Start: 2020-10-11 | End: 2020-10-11

## 2020-10-11 RX ADMIN — SODIUM CHLORIDE 500 MILLILITER(S): 9 INJECTION INTRAMUSCULAR; INTRAVENOUS; SUBCUTANEOUS at 16:05

## 2020-10-11 RX ADMIN — SODIUM CHLORIDE 1000 MILLILITER(S): 9 INJECTION INTRAMUSCULAR; INTRAVENOUS; SUBCUTANEOUS at 16:05

## 2020-10-11 NOTE — ED PROVIDER NOTE - PATIENT PORTAL LINK FT
You can access the FollowMyHealth Patient Portal offered by [facility] by registering at the following website: [email or web address]. By joining [facility]’s FollowMyHealth portal, you will also be able to view your health information using other applications (apps) compatible with our system.

## 2020-10-11 NOTE — ED ADULT NURSE NOTE - OBJECTIVE STATEMENT
bibems found on sidewalk singing "looking for [place]" and making snow angels on the sidewalk.   on arrival.  Possible PCP and K2 packets found on patient's pants pocket on arrival.  No visible signs of trauma noted.  Patient stuporous and responsive to pain on arrival.  No resp distress. SPO2 96% on room air.  Cardiac monitoring in place.  Belongings given to security.  Labs sent.  VSS. Patient in stable condition.  Continue to monitor.

## 2020-10-11 NOTE — ED ADULT NURSE NOTE - CHPI ED NUR SYMPTOMS NEG
no vomiting/no fever/no dizziness/no loss of consciousness/no nausea/no numbness/no weakness/no confusion/no blurred vision/no change in level of consciousness

## 2020-10-11 NOTE — ED PROVIDER NOTE - OBJECTIVE STATEMENT
39 y/o M with PMHx of major depressive disorder was found on the street [facility] on [address] PT was BIBEMS. As per EMS pt states "making snow angels", taking gibberish, and looking for [place]. Hx limited by mental status. ? K2 packet and PCP vial found next to pt. Pt admitted recently to psychologist in March 39 y/o M with PMHx of major depressive disorder was found on the street w AMS on [address]. PT was BIBEMS. As per EMS pt states "making snow angels", talking gibberish, and looking for "[place]". Hx limited by mental status. ? K2 packet and PCP vial found next to pt. Pt admitted recently to psychiaty in March.

## 2020-10-11 NOTE — ED PROVIDER NOTE - NSFOLLOWUPINSTRUCTIONS_ED_ALL_ED_FT
Substance Use Disorder      Substance use disorder occurs when a person's repeated use of drugs or alcohol interferes with his or her ability to be productive. This disorder can cause problems with mental and physical health. It can affect your ability to have healthy relationships, and it can keep you from being able to meet your responsibilities at work, home, or school. It can also lead to addiction, which is a condition in which the person cannot stop using the substance consistently for a period of time.    Addiction changes the way the brain works. Because of these changes, addiction is a chronic condition. Substance use disorder can be mild, moderate, or severe.  The most commonly abused substances include:  •Alcohol.      •Tobacco.      •Marijuana.      •Stimulants, such as cocaine and methamphetamine.      •Hallucinogens, such as LSD and PCP.      •Opioids, such as some prescription pain medicines and heroin.        What are the causes?  This condition may develop due to many complex social, psychological, or physical reasons, such as:  •Stress.      •Abuse.      •Peer pressure.      •Anxiety or depression.        What increases the risk?  This condition is more likely to develop in people who:  •Use substances to cope with stress.      •Have been abused.      •Have a mental health disorder, such as depression.      •Have a family history of substance use disorder.        What are the signs or symptoms?  Symptoms of this condition include:  •Using the substance for longer periods of time or at a higher dosage than what is normal or intended.      •Having a lasting desire to use the substance.      •Being unable to slow down or stop the use of the substance.      •Spending an abnormal amount of time getting the substance, using the substance, or recovering from using the substance.      •Using the substance in a way that interferes with work, school, social activities, and personal relationships.    •Using the substance even after having negative consequences, such as:  •Health problems.      •Legal or financial troubles.      •Job loss.      •Relationship problems.        •Needing more and more of the substance to get the same effect (developing tolerance).      •Experiencing unpleasant symptoms if you do not use the substance (withdrawal).      •Using the substance to avoid withdrawal symptoms.        How is this diagnosed?  This condition may be diagnosed based on:  •A physical exam.      •Your history of substance use.    •Your symptoms. This includes:  •How substance use affects your life.      •Changes in personality, behaviors, and mood.      •Having at least two symptoms of substance use disorder within a 12-month period.      •Health issues related to substance use, such as liver damage, shortness of breath, fatigue, cough, or heart problems.        •Blood or urine tests to screen for alcohol and drugs.        How is this treated?  This condition may be treated by:  •Stopping substance use safely. This may require taking medicines and being closely monitored for several days.      •Taking part in group and individual counseling from mental health providers who help people with substance use disorder.      •Staying at a live-in (residential) treatment center for several days or weeks.      •Attending daily counseling sessions at a treatment center.    •Taking medicine as told by your health care provider:  •To ease symptoms and prevent complications during withdrawal.      •To treat other mental health issues, such as depression or anxiety.      •To block cravings by causing the same effects as the substance.      •To block the effects of the substance or replace good sensations with unpleasant ones.        •Participating in a support group to share your experience with others who are going through the same thing. These groups are an important part of long-term recovery for many people.      Recovery can be a long process. Many people who undergo treatment start using the substance again after stopping (relapse). If you relapse, that does not mean that treatment will not work.      Follow these instructions at home:     •Take over-the-counter and prescription medicines only as told by your health care provider.      • Do not use any drugs or alcohol.      •Avoid temptations or triggers that you associate with your use of the substance.      •Learn and practice techniques for managing stress.      •Have a plan for vulnerable moments. Get phone numbers of people who are willing to help and who are committed to your recovery.      •Attend support groups on a regular basis. These groups include 12-step programs like Alcoholics Anonymous and Narcotics Anonymous.      •Keep all follow-up visits as told by your health care providers. This is important. This includes continuing to work with therapists and support groups.        Contact a health care provider if:    •You cannot take your medicines as told.      •Your symptoms get worse.      •You have trouble resisting the urge to use drugs or alcohol.        Get help right away if you:    •Relapse.      •Think that you may have taken too much of a drug. The hotline of the National Poison Control Center is (724) 117-1555.    •Have signs of an overdose. Symptoms include:  •Chest pain.      •Confusion.      •Sleepiness or difficulty staying awake.      •Slowed breathing.      •Nausea or vomiting.      •A seizure.        •Have serious thoughts about hurting yourself or someone else.      Drug overdose is an emergency. Do not wait to see if the symptoms will go away. Get medical help right away. Call your local emergency services (911 in the [place] Do not drive yourself to the hospital.   If you ever feel like you may hurt yourself or others, or have thoughts about taking your own life, get help right away. You can go to your nearest emergency department or call:   • Your local emergency services (911 in the [place]        • A suicide crisis helpline, such as the National Suicide Prevention Lifeline at 1-618.312.1966. This is open 24 hours a day.         Summary    •Substance use disorder occurs when a person's repeated use of drugs or alcohol interferes with his or her ability to be productive.      •Taking part in group and individual counseling from mental health providers is a common treatment for people with substance use disorder.      •Recovery can be a long process. Many people who undergo treatment start using the substance again after stopping (relapse). A relapse does not mean that treatment will not work.      •Attend support groups such as Alcoholics Anonymous and Narcotics Anonymous. These groups are an important part of long-term recovery for many people.      This information is not intended to replace advice given to you by your health care provider. Make sure you discuss any questions you have with your health care provider.      Document Released: 08/09/2006 Document Revised: 04/09/2020 Document Reviewed: 01/29/2019    [employer] Patient Education © 2020 [employer]

## 2020-10-11 NOTE — ED PROVIDER NOTE - CLINICAL SUMMARY MEDICAL DECISION MAKING FREE TEXT BOX
Pt w AMS likely secondary to drug abuse, ?K2/PCP, labs drawn, CTh/CT c spine wnl, pt continued to monitor in ED w improvement in mental status.

## 2020-10-11 NOTE — ED ADULT NURSE NOTE - NSIMPLEMENTINTERV_GEN_ALL_ED
Implemented All Fall Risk Interventions:  Hunker to call system. Call bell, personal items and telephone within reach. Instruct patient to call for assistance. Room bathroom lighting operational. Non-slip footwear when patient is off stretcher. Physically safe environment: no spills, clutter or unnecessary equipment. Stretcher in lowest position, wheels locked, appropriate side rails in place. Provide visual cue, wrist band, yellow gown, etc. Monitor gait and stability. Monitor for mental status changes and reorient to person, place, and time. Review medications for side effects contributing to fall risk. Reinforce activity limits and safety measures with patient and family.

## 2020-10-11 NOTE — ED PROVIDER NOTE - DIAGNOSTIC INTERPRETATION
ER Physician: Shruthi Costello MD  CHEST XRAY INTERPRETATION: lungs clear, heart shadow normal, bony structures intact

## 2020-10-11 NOTE — ED PROVIDER NOTE - PROGRESS NOTE DETAILS
NAD, resting, unable to state exactly what he took noted slightly hypotensive- likely K2 effect, will continue to monitor, saline 1L started. NAD, will sign out to Dr. Petersen and CHRISTINE Ponce, pending reassessment. recieved signout- awaiting clinical sobriety. pt sleeping will continue to monitor pt alert and oriented- states still high. will continue to monitor pt awakes to voice- appears clinically intoxicated. will continue to monitor pt awakes to voice- continues to appear clinically intoxicated. will continue to monitor pt awakes to voice- continues to fall asleep on exam and appears intoxicated. will continue to monitor pt awakes to voice and appears more clinically sober. does not offer complaints at this time. plan to ambulate and eventual dc home when clinically sober and with steady gait. dispo pending pt able to sit/stand from stretcher, unsteady gait. will allow pt to continue to metabolize in ed. pt not ambulatory with steady gait, clinically sober, d/c

## 2020-10-11 NOTE — ED ADULT TRIAGE NOTE - HEIGHT IN FEET
[facility]    [id number]    [facility] [address]    Phone:  [phone]       Thank You for choosing us for your health care visit. We are glad to serve you and happy to provide you with this summary of your visit. Please help us to ensure we have accurate records. If you find anything that needs to be changed, please let our staff know as soon as possible.          Your Demographic Information     Patient Name Sex Nisa Killian Female 1996       Ethnic Group Patient Race    Not of  or  Origin White      Your Visit Details     Date & Time Provider Department    2017 4:45 PM Mu López MD [facility]      Conditions Discussed Today or Order-Related Diagnoses        Comments    Acute recurrent maxillary sinusitis    -  Primary       Your Vitals Were     BP Pulse Temp Resp Weight LMP    138/80 (BP Location: [facility], Patient Position: Sitting, Cuff Size: Regular) 76 97.3 °F (36.3 °C) (Tympanic) 15 243 lb (110.2 kg) 2017    SpO2                   99%           Medications Prescribed or Re-Ordered Today     azithromycin (ZITHROMAX) 250 MG tablet    Si Tabs PO on day 1 and then 1 Tablet po daily on day 2-5.    Class: Eprescribe    Pharmacy: [facility] - [place], [place] - [id number] AT [facility] [address] Whittaker  #: [phone]      Your Current Medications Are        Disp Refills Start End    azithromycin (ZITHROMAX) 250 MG tablet 6 tablet 0 2017    Si Tabs PO on day 1 and then 1 Tablet po daily on day 2-5.    Class: Eprescribe      Discontinued Medications        Reason for Discontinue    Chlorhexidine-Isopropyl Alc 4-4 % SOLN Therapy Completed      Allergies     Amoxicillin HIVES      Immunizations History as of 2017     Name Date    Influenza 2009  3:39 PM    Tdap 2009  3:37 PM      Problem List as of 2017     Hidradenitis    Irregular menstrual cycle    Elevated liver enzymes            Patient Instructions     None       5

## 2020-10-11 NOTE — ED PROVIDER NOTE - PHYSICAL EXAMINATION
CONSTITUTIONAL: no apparent distress.  HEENT: Head is atraumatic. Eyes clear bilaterally, normal EOMI. Airway patent.  CARDIAC: Normal rate, regular rhythm.  Heart sounds S1, S2.   RESPIRATORY: Breath sounds clear and equal bilaterally. no tachypnea, respiratory distress.   GASTROINTESTINAL: Abdomen soft, non-tender, no guarding, distension.  MUSCULOSKELETAL: Spine appears normal, no midline spinal tenderness, range of motion is not limited, no muscle or joint tenderness. no bony tenderness.   NEUROLOGICAL:  no motor or sensory deficits.  SKIN: no trauma

## 2020-10-12 VITALS
OXYGEN SATURATION: 98 % | HEART RATE: 67 BPM | DIASTOLIC BLOOD PRESSURE: 85 MMHG | RESPIRATION RATE: 18 BRPM | SYSTOLIC BLOOD PRESSURE: 123 MMHG | TEMPERATURE: 98 F

## 2020-10-12 LAB
AMPHET UR-MCNC: NEGATIVE — SIGNIFICANT CHANGE UP
APPEARANCE UR: CLEAR — SIGNIFICANT CHANGE UP
BARBITURATES UR SCN-MCNC: NEGATIVE — SIGNIFICANT CHANGE UP
BENZODIAZ UR-MCNC: NEGATIVE — SIGNIFICANT CHANGE UP
BILIRUB UR-MCNC: NEGATIVE — SIGNIFICANT CHANGE UP
COCAINE METAB.OTHER UR-MCNC: NEGATIVE — SIGNIFICANT CHANGE UP
COLOR SPEC: YELLOW — SIGNIFICANT CHANGE UP
DIFF PNL FLD: NEGATIVE — SIGNIFICANT CHANGE UP
GLUCOSE UR QL: NEGATIVE — SIGNIFICANT CHANGE UP
KETONES UR-MCNC: NEGATIVE — SIGNIFICANT CHANGE UP
LEUKOCYTE ESTERASE UR-ACNC: NEGATIVE — SIGNIFICANT CHANGE UP
METHADONE UR-MCNC: NEGATIVE — SIGNIFICANT CHANGE UP
NITRITE UR-MCNC: NEGATIVE — SIGNIFICANT CHANGE UP
OPIATES UR-MCNC: NEGATIVE — SIGNIFICANT CHANGE UP
PCP SPEC-MCNC: SIGNIFICANT CHANGE UP
PCP UR-MCNC: POSITIVE
PH UR: 6 — SIGNIFICANT CHANGE UP (ref 5–8)
PROT UR-MCNC: NEGATIVE MG/DL — SIGNIFICANT CHANGE UP
SP GR SPEC: 1.02 — SIGNIFICANT CHANGE UP (ref 1–1.03)
THC UR QL: POSITIVE
UROBILINOGEN FLD QL: 0.2 E.U./DL — SIGNIFICANT CHANGE UP

## 2020-10-12 NOTE — ED ADULT NURSE REASSESSMENT NOTE - NS ED NURSE REASSESS COMMENT FT1
Patient awake alert and oriented to person, place and time.  Following commands, states "I smoked a lot."  Eating and drinking, tolerating well.

## 2020-10-15 DIAGNOSIS — Z20.828 CONTACT WITH AND (SUSPECTED) EXPOSURE TO OTHER VIRAL COMMUNICABLE DISEASES: ICD-10-CM

## 2020-10-15 DIAGNOSIS — R41.82 ALTERED MENTAL STATUS, UNSPECIFIED: ICD-10-CM

## 2020-10-15 DIAGNOSIS — Z88.5 ALLERGY STATUS TO NARCOTIC AGENT: ICD-10-CM

## 2020-10-15 DIAGNOSIS — F19.10 OTHER PSYCHOACTIVE SUBSTANCE ABUSE, UNCOMPLICATED: ICD-10-CM

## 2020-10-19 LAB — DRUG SCREEN, SERUM: SIGNIFICANT CHANGE UP

## 2021-05-12 NOTE — ED BEHAVIORAL HEALTH NOTE - BEHAVIORAL HEALTH NOTE
Patient reassessed this morning. Patient remains disorganized, unable to engage in meaningful conversation. Patient says that he wants to be stable and go to groups, be ''on and off." He says he has taken Zoloft and Zyprexa in the past, hears voices chronically, and has been suicidal before but not now.     Due to lack of collateral information for this patient, coupled with clear thought disorder and patient's seeking help, voluntary admission to psychiatric unit would be appropriate.     Will admit to [facility] under 9.13 legal status.     Would also give Zyprexa 5mg PO ONCE now. (1) More than 48 hours/None

## 2021-06-17 ENCOUNTER — APPOINTMENT (EMERGENCY)
Dept: RADIOLOGY | Facility: HOSPITAL | Age: 41
End: 2021-06-17
Attending: EMERGENCY MEDICINE
Payer: MEDICARE

## 2021-06-17 ENCOUNTER — HOSPITAL ENCOUNTER (OUTPATIENT)
Facility: HOSPITAL | Age: 41
Setting detail: OBSERVATION
Discharge: HOME | End: 2021-06-18
Attending: EMERGENCY MEDICINE | Admitting: HOSPITALIST
Payer: MEDICARE

## 2021-06-17 DIAGNOSIS — M62.82 NON-TRAUMATIC RHABDOMYOLYSIS: Primary | ICD-10-CM

## 2021-06-17 DIAGNOSIS — R45.851 SUICIDAL IDEATION: ICD-10-CM

## 2021-06-17 DIAGNOSIS — F19.10 SUBSTANCE ABUSE (CMS/HCC): ICD-10-CM

## 2021-06-17 PROBLEM — R41.82 ALTERED MENTAL STATUS: Status: ACTIVE | Noted: 2021-06-17

## 2021-06-17 PROBLEM — R74.8 ELEVATED CPK: Status: ACTIVE | Noted: 2021-06-17

## 2021-06-17 PROBLEM — R74.01 ELEVATED AST (SGOT): Status: ACTIVE | Noted: 2021-06-17

## 2021-06-17 PROBLEM — F19.921: Status: ACTIVE | Noted: 2021-06-17

## 2021-06-17 PROBLEM — R45.850 HOMICIDAL IDEATION: Status: ACTIVE | Noted: 2021-06-17

## 2021-06-17 PROBLEM — Z72.0 TOBACCO ABUSE: Status: ACTIVE | Noted: 2021-06-17

## 2021-06-17 LAB
ALBUMIN SERPL-MCNC: 3.9 G/DL (ref 3.4–5)
ALP SERPL-CCNC: 56 IU/L (ref 35–126)
ALT SERPL-CCNC: 57 IU/L (ref 16–63)
AMPHET UR QL SCN: NOT DETECTED
ANION GAP SERPL CALC-SCNC: 10 MEQ/L (ref 3–15)
APAP SERPL-MCNC: <10 UG/ML (ref 10–30)
AST SERPL-CCNC: 75 IU/L (ref 15–41)
ATRIAL RATE: 67
BACTERIA URNS QL MICRO: ABNORMAL /HPF
BARBITURATES UR QL SCN: NOT DETECTED
BASOPHILS # BLD: 0.03 K/UL (ref 0.01–0.1)
BASOPHILS NFR BLD: 0.4 %
BENZODIAZ UR QL SCN: NOT DETECTED
BILIRUB SERPL-MCNC: 0.7 MG/DL (ref 0.3–1.2)
BILIRUB UR QL STRIP.AUTO: 1 MG/DL
BUN SERPL-MCNC: 8 MG/DL (ref 8–20)
CALCIUM SERPL-MCNC: 9.4 MG/DL (ref 8.9–10.3)
CANNABINOIDS UR QL SCN: POSITIVE
CHLORIDE SERPL-SCNC: 103 MEQ/L (ref 98–109)
CK SERPL-CCNC: 1252 U/L (ref 16–300)
CK SERPL-CCNC: 1839 U/L (ref 16–300)
CK SERPL-CCNC: 926 U/L (ref 16–300)
CLARITY UR REFRACT.AUTO: CLEAR
CO2 SERPL-SCNC: 28 MEQ/L (ref 22–32)
COCAINE UR QL SCN: NOT DETECTED
COLOR UR AUTO: ABNORMAL
CREAT SERPL-MCNC: 1.1 MG/DL (ref 0.8–1.3)
DIFFERENTIAL METHOD BLD: ABNORMAL
EOSINOPHIL # BLD: 0.1 K/UL (ref 0.04–0.54)
EOSINOPHIL NFR BLD: 1.3 %
ERYTHROCYTE [DISTWIDTH] IN BLOOD BY AUTOMATED COUNT: 14.6 % (ref 11.6–14.4)
ETHANOL SERPL-MCNC: <5 MG/DL
GFR SERPL CREATININE-BSD FRML MDRD: >60 ML/MIN/1.73M*2
GLUCOSE SERPL-MCNC: 96 MG/DL (ref 70–99)
GLUCOSE UR STRIP.AUTO-MCNC: NEGATIVE MG/DL
HCT VFR BLDCO AUTO: 44.3 % (ref 40.1–51)
HGB BLD-MCNC: 15 G/DL (ref 13.7–17.5)
HGB UR QL STRIP.AUTO: NEGATIVE
HYALINE CASTS #/AREA URNS LPF: ABNORMAL /LPF
IMM GRANULOCYTES # BLD AUTO: 0.01 K/UL (ref 0–0.08)
IMM GRANULOCYTES NFR BLD AUTO: 0.1 %
KETONES UR STRIP.AUTO-MCNC: 2 MG/DL
LEUKOCYTE ESTERASE UR QL STRIP.AUTO: NEGATIVE
LYMPHOCYTES # BLD: 1.19 K/UL (ref 1.2–3.5)
LYMPHOCYTES NFR BLD: 15.6 %
MAGNESIUM SERPL-MCNC: 2 MG/DL (ref 1.8–2.5)
MCH RBC QN AUTO: 26.3 PG (ref 28–33.2)
MCHC RBC AUTO-ENTMCNC: 33.9 G/DL (ref 32.2–36.5)
MCV RBC AUTO: 77.7 FL (ref 83–98)
MONOCYTES # BLD: 0.76 K/UL (ref 0.3–1)
MONOCYTES NFR BLD: 9.9 %
MYOGLOBIN UR QL: NEGATIVE
NEUTROPHILS # BLD: 5.56 K/UL (ref 1.7–7)
NEUTS SEG NFR BLD: 72.7 %
NITRITE UR QL STRIP.AUTO: NEGATIVE
NRBC BLD-RTO: 0 %
OCCULT BLOOD, URINE (UMYOG): NEGATIVE
OPIATES UR QL SCN: NOT DETECTED
P AXIS: 31
PCP UR QL SCN: POSITIVE
PDW BLD AUTO: 11 FL (ref 9.4–12.4)
PH UR STRIP.AUTO: 6 [PH]
PLATELET # BLD AUTO: 260 K/UL (ref 150–350)
POTASSIUM SERPL-SCNC: 3.9 MEQ/L (ref 3.6–5.1)
PR INTERVAL: 136
PROT SERPL-MCNC: 7.1 G/DL (ref 6–8.2)
PROT UR QL STRIP.AUTO: 1
QRS DURATION: 82
QT INTERVAL: 426
QTC CALCULATION(BAZETT): 450
R AXIS: 27
RBC # BLD AUTO: 5.7 M/UL (ref 4.5–5.8)
RBC #/AREA URNS HPF: ABNORMAL /HPF
SALICYLATES SERPL-MCNC: <4 MG/DL
SARS-COV-2 RNA RESP QL NAA+PROBE: NEGATIVE
SODIUM SERPL-SCNC: 141 MEQ/L (ref 136–144)
SP GR UR REFRACT.AUTO: >1.035
SQUAMOUS URNS QL MICRO: 1 /HPF
T WAVE AXIS: 4
UROBILINOGEN UR STRIP-ACNC: 1 EU/DL
VENTRICULAR RATE: 67
WBC # BLD AUTO: 7.65 K/UL (ref 3.8–10.5)
WBC #/AREA URNS HPF: ABNORMAL /HPF

## 2021-06-17 PROCEDURE — 25000000 HC PHARMACY GENERAL: Performed by: HOSPITALIST

## 2021-06-17 PROCEDURE — 25000000 HC PHARMACY GENERAL

## 2021-06-17 PROCEDURE — 93005 ELECTROCARDIOGRAM TRACING: CPT | Performed by: PHYSICIAN ASSISTANT

## 2021-06-17 PROCEDURE — 85025 COMPLETE CBC W/AUTO DIFF WBC: CPT | Performed by: PHYSICIAN ASSISTANT

## 2021-06-17 PROCEDURE — 63600000 HC DRUGS/DETAIL CODE: Performed by: HOSPITALIST

## 2021-06-17 PROCEDURE — 36415 COLL VENOUS BLD VENIPUNCTURE: CPT | Performed by: PHYSICIAN ASSISTANT

## 2021-06-17 PROCEDURE — G0378 HOSPITAL OBSERVATION PER HR: HCPCS

## 2021-06-17 PROCEDURE — 71045 X-RAY EXAM CHEST 1 VIEW: CPT

## 2021-06-17 PROCEDURE — U0002 COVID-19 LAB TEST NON-CDC: HCPCS | Performed by: EMERGENCY MEDICINE

## 2021-06-17 PROCEDURE — 82550 ASSAY OF CK (CPK): CPT | Mod: 91 | Performed by: HOSPITALIST

## 2021-06-17 PROCEDURE — 80307 DRUG TEST PRSMV CHEM ANLYZR: CPT | Mod: GZ | Performed by: PHYSICIAN ASSISTANT

## 2021-06-17 PROCEDURE — 90792 PSYCH DIAG EVAL W/MED SRVCS: CPT | Performed by: PSYCHIATRY & NEUROLOGY

## 2021-06-17 PROCEDURE — 83735 ASSAY OF MAGNESIUM: CPT | Performed by: PHYSICIAN ASSISTANT

## 2021-06-17 PROCEDURE — 93010 ELECTROCARDIOGRAM REPORT: CPT | Performed by: INTERNAL MEDICINE

## 2021-06-17 PROCEDURE — 82550 ASSAY OF CK (CPK): CPT | Performed by: PHYSICIAN ASSISTANT

## 2021-06-17 PROCEDURE — 25800000 HC PHARMACY IV SOLUTIONS: Performed by: HOSPITALIST

## 2021-06-17 PROCEDURE — 99285 EMERGENCY DEPT VISIT HI MDM: CPT | Mod: 25

## 2021-06-17 PROCEDURE — 80053 COMPREHEN METABOLIC PANEL: CPT | Performed by: PHYSICIAN ASSISTANT

## 2021-06-17 PROCEDURE — 25800000 HC PHARMACY IV SOLUTIONS: Performed by: PHYSICIAN ASSISTANT

## 2021-06-17 PROCEDURE — 200200 PR NO CHARGE: Performed by: STUDENT IN AN ORGANIZED HEALTH CARE EDUCATION/TRAINING PROGRAM

## 2021-06-17 PROCEDURE — 96361 HYDRATE IV INFUSION ADD-ON: CPT

## 2021-06-17 PROCEDURE — 81005 URINALYSIS: CPT | Performed by: PHYSICIAN ASSISTANT

## 2021-06-17 PROCEDURE — 96365 THER/PROPH/DIAG IV INF INIT: CPT

## 2021-06-17 PROCEDURE — 81001 URINALYSIS AUTO W/SCOPE: CPT | Mod: 59 | Performed by: PHYSICIAN ASSISTANT

## 2021-06-17 PROCEDURE — 96372 THER/PROPH/DIAG INJ SC/IM: CPT | Mod: 59

## 2021-06-17 PROCEDURE — 99218 PR INITIAL OBSERVATION CARE/DAY 30 MINUTES: CPT | Performed by: HOSPITALIST

## 2021-06-17 PROCEDURE — 63700000 HC SELF-ADMINISTRABLE DRUG: Performed by: STUDENT IN AN ORGANIZED HEALTH CARE EDUCATION/TRAINING PROGRAM

## 2021-06-17 PROCEDURE — 96367 TX/PROPH/DG ADDL SEQ IV INF: CPT

## 2021-06-17 PROCEDURE — 96366 THER/PROPH/DIAG IV INF ADDON: CPT

## 2021-06-17 PROCEDURE — G0480 DRUG TEST DEF 1-7 CLASSES: HCPCS | Performed by: PHYSICIAN ASSISTANT

## 2021-06-17 PROCEDURE — 63600000 HC DRUGS/DETAIL CODE: Mod: JW | Performed by: EMERGENCY MEDICINE

## 2021-06-17 RX ORDER — DEXTROSE 50 % IN WATER (D50W) INTRAVENOUS SYRINGE
25 AS NEEDED
Status: DISCONTINUED | OUTPATIENT
Start: 2021-06-17 | End: 2021-06-18 | Stop reason: HOSPADM

## 2021-06-17 RX ORDER — HALOPERIDOL 5 MG/1
5 TABLET ORAL 2 TIMES DAILY
Status: DISCONTINUED | OUTPATIENT
Start: 2021-06-17 | End: 2021-06-18 | Stop reason: HOSPADM

## 2021-06-17 RX ORDER — SODIUM CHLORIDE, SODIUM LACTATE, POTASSIUM CHLORIDE, CALCIUM CHLORIDE 600; 310; 30; 20 MG/100ML; MG/100ML; MG/100ML; MG/100ML
INJECTION, SOLUTION INTRAVENOUS CONTINUOUS
Status: DISCONTINUED | OUTPATIENT
Start: 2021-06-17 | End: 2021-06-17

## 2021-06-17 RX ORDER — SODIUM CHLORIDE, SODIUM LACTATE, POTASSIUM CHLORIDE, CALCIUM CHLORIDE 600; 310; 30; 20 MG/100ML; MG/100ML; MG/100ML; MG/100ML
INJECTION, SOLUTION INTRAVENOUS CONTINUOUS
Status: DISCONTINUED | OUTPATIENT
Start: 2021-06-17 | End: 2021-06-18 | Stop reason: HOSPADM

## 2021-06-17 RX ORDER — NITROGLYCERIN 0.4 MG/1
0.4 TABLET SUBLINGUAL EVERY 5 MIN PRN
Status: DISCONTINUED | OUTPATIENT
Start: 2021-06-17 | End: 2021-06-18 | Stop reason: HOSPADM

## 2021-06-17 RX ORDER — THIAMINE HCL 100 MG
100 TABLET ORAL DAILY
Status: DISCONTINUED | OUTPATIENT
Start: 2021-06-20 | End: 2021-06-18 | Stop reason: HOSPADM

## 2021-06-17 RX ORDER — DEXTROSE 40 %
15-30 GEL (GRAM) ORAL AS NEEDED
Status: DISCONTINUED | OUTPATIENT
Start: 2021-06-17 | End: 2021-06-18 | Stop reason: HOSPADM

## 2021-06-17 RX ORDER — FOLIC ACID 1 MG/1
1 TABLET ORAL DAILY
Status: DISCONTINUED | OUTPATIENT
Start: 2021-06-20 | End: 2021-06-18 | Stop reason: HOSPADM

## 2021-06-17 RX ORDER — ATROPINE SULFATE 0.1 MG/ML
0.5 INJECTION INTRAVENOUS EVERY 5 MIN PRN
Status: DISCONTINUED | OUTPATIENT
Start: 2021-06-17 | End: 2021-06-18 | Stop reason: HOSPADM

## 2021-06-17 RX ORDER — IBUPROFEN 200 MG
16-32 TABLET ORAL AS NEEDED
Status: DISCONTINUED | OUTPATIENT
Start: 2021-06-17 | End: 2021-06-18 | Stop reason: HOSPADM

## 2021-06-17 RX ORDER — ZIPRASIDONE MESYLATE 20 MG/ML
10 INJECTION, POWDER, LYOPHILIZED, FOR SOLUTION INTRAMUSCULAR ONCE
Status: COMPLETED | OUTPATIENT
Start: 2021-06-17 | End: 2021-06-17

## 2021-06-17 RX ORDER — DIVALPROEX SODIUM 500 MG/1
1000 TABLET, FILM COATED, EXTENDED RELEASE ORAL NIGHTLY
Status: DISCONTINUED | OUTPATIENT
Start: 2021-06-17 | End: 2021-06-18 | Stop reason: HOSPADM

## 2021-06-17 RX ORDER — WATER FOR INJECTION,STERILE
VIAL (ML) INJECTION
Status: COMPLETED
Start: 2021-06-17 | End: 2021-06-17

## 2021-06-17 RX ADMIN — SODIUM CHLORIDE, SODIUM LACTATE, POTASSIUM CHLORIDE, CALCIUM CHLORIDE: 600; 310; 30; 20 INJECTION, SOLUTION INTRAVENOUS at 10:00

## 2021-06-17 RX ADMIN — SODIUM CHLORIDE, SODIUM LACTATE, POTASSIUM CHLORIDE, CALCIUM CHLORIDE: 600; 310; 30; 20 INJECTION, SOLUTION INTRAVENOUS at 23:00

## 2021-06-17 RX ADMIN — SODIUM CHLORIDE, SODIUM LACTATE, POTASSIUM CHLORIDE, CALCIUM CHLORIDE: 600; 310; 30; 20 INJECTION, SOLUTION INTRAVENOUS at 18:44

## 2021-06-17 RX ADMIN — DIVALPROEX SODIUM 1000 MG: 500 TABLET, FILM COATED, EXTENDED RELEASE ORAL at 22:54

## 2021-06-17 RX ADMIN — WATER: 1 INJECTION, SOLUTION INTRAMUSCULAR; INTRAVENOUS; SUBCUTANEOUS at 06:20

## 2021-06-17 RX ADMIN — THIAMINE HYDROCHLORIDE 400 MG: 100 INJECTION, SOLUTION INTRAMUSCULAR; INTRAVENOUS at 06:54

## 2021-06-17 RX ADMIN — THIAMINE HYDROCHLORIDE 400 MG: 100 INJECTION, SOLUTION INTRAMUSCULAR; INTRAVENOUS at 14:48

## 2021-06-17 RX ADMIN — SODIUM CHLORIDE, POTASSIUM CHLORIDE, SODIUM LACTATE AND CALCIUM CHLORIDE 1000 ML: 600; 310; 30; 20 INJECTION, SOLUTION INTRAVENOUS at 06:48

## 2021-06-17 RX ADMIN — SODIUM CHLORIDE, SODIUM LACTATE, POTASSIUM CHLORIDE, CALCIUM CHLORIDE: 600; 310; 30; 20 INJECTION, SOLUTION INTRAVENOUS at 08:04

## 2021-06-17 RX ADMIN — FOLIC ACID 1 MG: 5 INJECTION, SOLUTION INTRAMUSCULAR; INTRAVENOUS; SUBCUTANEOUS at 08:03

## 2021-06-17 RX ADMIN — THIAMINE HYDROCHLORIDE 400 MG: 100 INJECTION, SOLUTION INTRAMUSCULAR; INTRAVENOUS at 21:00

## 2021-06-17 RX ADMIN — ZIPRASIDONE MESYLATE 10 MG: 20 INJECTION, POWDER, LYOPHILIZED, FOR SOLUTION INTRAMUSCULAR at 06:20

## 2021-06-17 RX ADMIN — HALOPERIDOL 5 MG: 5 TABLET ORAL at 22:54

## 2021-06-17 RX ADMIN — SODIUM CHLORIDE 2000 ML: 9 INJECTION, SOLUTION INTRAVENOUS at 05:19

## 2021-06-17 ASSESSMENT — ENCOUNTER SYMPTOMS
FEVER: 0
COLOR CHANGE: 0
SHORTNESS OF BREATH: 0
BACK PAIN: 0
COUGH: 0
NERVOUS/ANXIOUS: 1
WHEEZING: 0
NECK PAIN: 0
HEMATURIA: 0
SPEECH DIFFICULTY: 0
WEAKNESS: 0
DYSPHORIC MOOD: 1
DIFFICULTY URINATING: 0
NAUSEA: 0
HEADACHES: 0
SLEEP DISTURBANCE: 0
VOMITING: 0
ABDOMINAL PAIN: 0
ACTIVITY CHANGE: 0
DIAPHORESIS: 0
SORE THROAT: 0
DIARRHEA: 0
MYALGIAS: 1
FACIAL SWELLING: 0
SEIZURES: 0
AGITATION: 0

## 2021-06-17 ASSESSMENT — PATIENT HEALTH QUESTIONNAIRE - PHQ9: SUM OF ALL RESPONSES TO PHQ9 QUESTIONS 1 & 2: 0

## 2021-06-17 NOTE — PROGRESS NOTES
Spoke with patient (limited historian) and confirmed with medication list from [facility] and/or patient's own pharmacy to complete the medication reconciliation.     Comments about home medications:  -Patient reports no medication but does have history of psychiatric medications prescribed while inpatient   -December 2020 patient was prescribed doxepin 50 mg, nicotine 21 mg patch, nicotine 4 mg lozenge and olanzapine 15 mg.    Additional comments:   -Patient reports no pharmacy and requests bedside delivery prior to discharge from [facility] in [facility] if any medications are prescribed at discharge.

## 2021-06-17 NOTE — PROGRESS NOTES
Hospital Medicine Service -  Daily Progress Note       SUBJECTIVE   Interval History: No new complaints. Seen in the ED. Sleeping due to effects of chemical restraint given during agitation.      OBJECTIVE      Vital signs in last 24 hours:  Temp:  [36.1 °C (97 °F)-36.7 °C (98.1 °F)] 36.1 °C (97 °F)  Heart Rate:  [61-80] 80  Resp:  [16-18] 16  BP: (117-119)/(60-78) 117/60    Intake/Output Summary (Last 24 hours) at 6/17/2021 1213  Last data filed at 6/17/2021 0804  Gross per 24 hour   Intake 3100 ml   Output --   Net 3100 ml       PHYSICAL EXAMINATION      Physical Exam  Constitutional:       Appearance: Normal appearance.      Comments: In and out sleeping due to geodon.    Cardiovascular:      Rate and Rhythm: Normal rate and regular rhythm.      Pulses: Normal pulses.      Heart sounds: Normal heart sounds. No murmur heard.   No friction rub. No gallop.    Pulmonary:      Effort: Pulmonary effort is normal. No respiratory distress.      Breath sounds: Normal breath sounds. No stridor. No wheezing or rhonchi.   Abdominal:      General: Abdomen is flat. There is no distension.      Palpations: Abdomen is soft. There is no mass.      Tenderness: There is no abdominal tenderness.      Hernia: No hernia is present.   Musculoskeletal:         General: No swelling, tenderness, deformity or signs of injury. Normal range of motion.   Skin:     General: Skin is warm and dry.      Capillary Refill: Capillary refill takes less than 2 seconds.   Neurological:      Mental Status: He is alert.      Comments: Very sleepy due to medication administration. Did not wake him up to assess further.         LINES, CATHETERS, DRAINS, AIRWAYS, AND WOUNDS   Lines, Drains, and Airways:  Wounds (agree with documentation and present on admission):         Comments:      LABS / IMAGING / TELE      Labs  CK 1839 -> 1252    SARS-CoV-2 (COVID-19) (no units)   Date/Time Value   06/17/2021 0530 Negative       Imaging  I have independently  reviewed the pertinent imaging from the last 24 hrs.    ECG/Telemetry  I have independently reviewed the telemetry. No events for the last 24 hours.    ASSESSMENT AND PLAN      Homicidal ideation  Assessment & Plan  No homicidal ideations to me or team today    Suicidal ideation  Assessment & Plan  Reported SI to ED providers  1:1 ordered  Crisis and psychiatry consulted    Elevated AST (SGOT)  Assessment & Plan  Due to rhabdo as above  CIWA  Trend LFTs    Polysubstance abuse (CMS/HCC)  Assessment & Plan  -psychiatry seeing him today  -UDS positive for PCP & THC.   - plan for inpatient psych.     Non-traumatic rhabdomyolysis  Assessment & Plan  Likely related to substance abuse  Aggressive IV fluids, 200cc/hr for now  Trend to get CK < 800      * Drug intoxication with delirium (CMS/HCC)  Assessment & Plan  Intoxicated on pcp at the time of arrival and admission  Will hopefully wain with withdrawal of pcp  -monitor       VTE Assessment: Padua    VTE Prophylaxis:  Current anticoagulants:    •None      Code Status: Full Code      Estimated Discharge Date: 6/18/2021   Disposition Planning: inpatient psych     Daniel Howard DO  6/17/2021

## 2021-06-17 NOTE — CONSULTS
"Behavioral Health Crisis Clinician Note    Reason for Consult    SW was consulted due to the patient presenting with +SI without a clear plan and use of PCP.     Presenting Problem  Suicidal ideation [R45.851]  Substance abuse (CMS/LTAC, located within [facility]) [F19.10]  Elevated CPK [R74.8]  Non-traumatic rhabdomyolysis [M62.82]     Interval History  Sanchez Wood is a 41 y.o. male who was admitted with unknown past medical history who was brought in by EMS tonight.     The patient is an extremely poor historian at the time my evaluation secondary to possibly psychiatric illness and possible substance abuse/intoxication from unknown agent.     The patient's history of present illness was supplemented by my discussion with the emergency room physician Dr. Cook and from the emergency room physician assistant Daniel Jimenez.     Per my discussion with the emergency room providers the patient was brought in by EMS.  The patient is reportedly homeless.  The patient reportedly stated that he had dipped cigarettes into some substance and smoked it tonight.  The patient is unable to provide any other history.  He reported told the ED providers that he wanted to \"detox from everything\" but would not state the use of any illicit.  During my evaluation the patient admitted to history of tobacco abuse, but denied marijuana, cocaine, or IV drugs.  He denied PCP use to the emergency room providers however stated to me that he may have used PCP however could not tell me the last time he used this. Also admitted to both suicidal and homicidal ideation however was not able to articulate a plan.     Drug & Alcohol Assessment    Drug Amount Frequency Last Use   PCP 2 dippers unknown 2 days ago   Alcohol  unknown unknown 2 days ago                 Psychiatric Assessment  Suicide Attempts: yes - Pt reports a history of SA via cutting & other intentional OD's, \"I was experimenting on myself, my physical, my brain\".    Risk Factors: Alcohol and/or " substance abuse, Easy access to lethal methods, Barriers to accessing treatment , Physical illness and Help rejecting  Protective Factors: Effective and accessible mental health care  and Connectedness to individuals, family, community, and social institutions   Current Psychiatrist: no  Past psychiatric Hospitalization: no  Medication Trials:  no  ECT trials: no    Mental Status Exam  • Appearance: unkempt and appropriate attire  • Speech: excessive, pressured and rapid  • Mood:   • Affect: restricted  • Suicidality/Homicidality: thoughts of being dead/ no desire or plan to die  • Thought Process: flight of ideas and tangential  • Thought Content: auditory hallucinations, visual hallucinations and paranoia  • Orientation: situation  • Cognition: impaired due to psychosis and non-compliance with medications   • Judgement/Insight: minimizes severity level of illness and makes choices that perpetuate illness    Review of Systems  Current Medications:  •  atropine, 0.5 mg, intravenous, q5 min PRN  •  glucose, 16-32 g of dextrose, oral, PRN **OR** dextrose, 15-30 g of dextrose, oral, PRN **OR** glucagon, 1 mg, intramuscular, PRN **OR** dextrose in water, 25 mL, intravenous, PRN  •  folic acid, 1 mg, intravenous, Daily **FOLLOWED BY** [START ON 6/20/2021] folic acid, 1 mg, oral, Daily  •  lactated ringer's, , intravenous, Continuous  •  nitroglycerin, 0.4 mg, sublingual, q5 min PRN  •  thiamine (VITAMIN B1) IVPB, 400 mg, intravenous, q8h BROOKE **FOLLOWED BY** [START ON 6/20/2021] thiamine, 100 mg, oral, Daily    Home Medications:      Objective     Vital Signs for the last 24 hours:  Temp:  [36.1 °C (97 °F)-36.7 °C (98.1 °F)] 36.1 °C (97 °F)  Heart Rate:  [61-80] 80  Resp:  [16-18] 16  BP: (117-119)/(60-78) 117/60    Assessment/Plan    41 y.o. male being consulted for vague SI without a clear plan and use of PCP upon admission.  The patient presents with a disorganized thought process, stating that he initially lives  "alone, but is also homeless, pays rent but doesn't have stable housing, works as a lazo.  He reports that he has +AH , \"of course, who I am, what's my name, people call me many names\".  He is not oriented at this time and continuously stated that the month is, \"8, like august\", even after being informed that it was June.  He denied having any family or support, and reports drug use, \"when I can get them.  I take OTC meds, under the counter, everything, no psych meds\".  He has 2, 3 or 5 children, \"because they're multiplying out there\".  He also has 10-15 cars and complained that he hasn't slept in, \"years\".  He reports using PCP, \"maybe meth, maybe heroin, alcohol\", but was unable to state his usage patterns or history.  He appeared paranoid and reported that he doesn't trust anyone, \"I fight the law and the law fight me too\".  He is supposed to be attending [facility] but did not follow up after his last inpatient stay in February at [facility].  He is agreeable to being referred back to inpatient psych upon discharge and has already been in 46 acute hospitals since 2007.  [facility] will continue to follow the patient's case & will work on placement once the patient is medically cleared & stable.    Ingrid Pedraza MSW, LSW  Pgr: [phone]         "

## 2021-06-17 NOTE — CONSULTS
"Psychiatry Consult    Diagnosis: Suicidal ideation [R45.851]  Substance abuse (CMS/[facility]) [F19.10]  Elevated CPK [R74.8]  Non-traumatic rhabdomyolysis [M62.82].  Chief Complaint:   Chief Complaint   Patient presents with   • Altered Mental Status   • Lower Extremity Issue     Subjective     Sanchez Wood is a 41 y.o. male with unknown past medical history [facility]  The patient is a  poor historian at the time evaluation secondary to possibly psychiatric illness and possible substance abuse/intoxication from unknown agent.  Per primary team providers, the patient was brought in by EMS.  The patient is reportedly homeless but later endorsing that he lives \" everywhere, hotels, rooms \" .  The patient reportedly stated that he had dipped cigarettes into some substance and smoked it before admission- UDS was pos for PCP and THC- he is known to use K2 in the past.  The patient is unable to provide any meaningful history.  He reported told the ED providers that he wanted to \"detox from everything\" but would not state the use of any illicits.  He denied PCP use to the emergency room providers however stated to me that he may have used PCP however could not tell me the last time he used this. Also admitted to both suicidal and homicidal ideation however was not able to articulate a plan.   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 He also added that he is not sleeping, hat he is very productive and can take care of himself. That he owns \" 5, no, 10 to 15 cars\"    Then next talked about \" there is something wrong with the bread, I don't know, it is hard but also soft \" without any logical connection to the questions we were asking. Then saying he does not think we are real doctos \" why should I talk to " "you, \", adding that there are a lot of people like him around\" I keep multiplying \"   He also reports once instance when he tried to cut his wrist and then he was also \" operated on, I had all these cancers and tumors\"- has an old scar on hsi neck ?  Denies death wish but is very disorganized and grandiose, pressured with FOI, racing thoughts   Admits to hallucinations, both visual and aud , vague about content   PDMP: none reported by query     Collateral information: from [facility], multiple inp tpsych admission, most recent at [facility] in feb 2021. Also at [facility] last year almost 50 recent inpt padmission  He does not know if he is still taking meds at this time, reports \" they don'ts work anyway \"     Psychiatric History:        Suicidality- denies to us any thoughts, plans or intent but had endorsed suicidality earlier   Suicide Attempts: yes - reports once? But he is poor historian      Risk Factors: Alcohol and/or substance abuse and Impulsive or aggressive tendencies      Protective Factors:   Internal - has fair coping skills   External - limited support        Connectedness to individuals, family, community, and social institutions   Current Psychiatrist: unknown  Past psychiatric Hospitalization: yes - multiple   Medication Trials: mentions haloperidol, risperidone, olanzapine to name only a few; also sertraline     Agitation, impulsivity or aggression - evasive  Legal hx- unk    Substance Use History:  Substance use: PCP, THC and possible K2   Past D&A Treatment: unk    Family History: History reviewed. No pertinent family history.    Social History:   Social History     Socioeconomic History   • Marital status: Single     Spouse name: None   • Number of children: None   • Years of education: None   • Highest education level: None   Occupational History   • None   Tobacco Use   • Smoking status: Current Every Day Smoker   Substance and Sexual Activity   • Alcohol use: Yes   • Drug use: None     Comment: pt " unwilling to answer   • Sexual activity: None   Other Topics Concern   • None   Social History Narrative   • None     Social Determinants of Health     Financial Resource Strain:    • Difficulty of Paying Living Expenses:    Food Insecurity:    • Worried About Running Out of Food in the Last Year:    • Ran Out of Food in the Last Year:    Transportation Needs:    • Lack of Transportation (Medical):    • Lack of Transportation (Non-Medical):    Physical Activity:    • Days of Exercise per Week:    • Minutes of Exercise per Session:    Stress:    • Feeling of Stress :    Social Connections:    • Frequency of Communication with Friends and Family:    • Frequency of Social Gatherings with Friends and Family:    • Attends Religion Services:    • Active Member of Clubs or Organizations:    • Attends Club or Organization Meetings:    • Marital Status:    Intimate Partner Violence:    • Fear of Current or Ex-Partner:    • Emotionally Abused:    • Physically Abused:    • Sexually Abused:      Employment- unclear   Education- unk  Stressors- poor compliance  Strength/ supports- limited      Medical History: History reviewed. No pertinent past medical history.    Allergies:   Allergies   Allergen Reactions   • Morphine    • Oxycodone        Current Medications:  HOME MEDS:    SCHEDULED:  • folic acid  1 mg intravenous Daily    Followed by   • [START ON 6/20/2021] folic acid  1 mg oral Daily   • thiamine (VITAMIN B1) IVPB  400 mg intravenous q8h BROOKE    Followed by   • [START ON 6/20/2021] thiamine  100 mg oral Daily     PRN  •  atropine  •  glucose **OR** dextrose **OR** glucagon **OR** dextrose in water  •  nitroglycerin    Review of Systems  Review of systems not obtained due to patient factors.    Objective     Vital Signs for the last 24 hours:  Temp:  [36.1 °C (97 °F)-36.7 °C (98.1 °F)] 36.1 °C (97 °F)  Heart Rate:  [61-80] 80  Resp:  [16-18] 16  BP: (117-119)/(60-78) 117/60    Labs  Results from last 7 days   Lab Units  "06/17/21  0415   HEMOGLOBIN g/dL 15.0   WBC K/uL 7.65   PLATELETS K/uL 260   POTASSIUM mEQ/L 3.9   SODIUM mEQ/L 141   CREATININE mg/dL 1.1     Results from last 7 days   Lab Units 06/17/21  0415   ALT IU/L 57   AST IU/L 75*   ALK PHOS IU/L 56     Ethanol   Date Value Ref Range Status   06/17/2021 <5 <=10 mg/dL Final     Lab Results   Component Value Date    TSH 2.86 02/27/2015     No results found for: PHENYTOIN, PHENOBARB, VALPROATE, CBMZ  No results found for: HDL, LDLCALC, TRIG, CHOL, HGBA1C  X-RAY CHEST 1 VIEW    Result Date: 6/17/2021  IMPRESSION: No active disease in the chest. COMMENT: The current portable study the chest demonstrates somewhat limited inspiratory result. There are no consolidating infiltrates. No pulmonary vascular congestion or edema is seen. No atelectasis or pleural effusions are demonstrated. The cardiomediastinal silhouette is within normal limits of size and configuration. The bony thorax is intact.      Mental Status Evaluation:  In bed, disheveled, avoidant eye contact, speech is pressed, loud, mood is good, affect is expansive, labile from frustrated to defensive to grandiose, his TP is distracted, some blocking but also FOI, he reports gramndiose delusions, paranoia, hallucinations that he did not want to elaborate on, denied to me SI but endorsed some in the ER earlier, admits to using drugs \" because no medication ever works for me \", oriented to person and place, kept saying it is august 21 st century despite being corrected numerous times atto correct date, limited I. Poor J     Assessment   Impression/ risk assessment   41 y.o. male being consulted for vague SI without a clear plan and use of PCP upon admission.  The patient presents with a disorganized thought process, stating that he initially lives alone, but is also homeless, pays rent but doesn't have stable housing, works as a lazo.  He reports that he has +AH , \"of course, who I am, what's my name, people call me " "many names\".  He is not oriented at this time and continuously stated that the month is, \"8, like august\", even after being informed that it was June.  He denied having any family or support, and reports drug use, \"when I can get them.  I take OTC meds, under the counter, everything, no psych meds\".  He has 2, 3 or 5 children, \"because they're multiplying out there\".  He also has 10-15 cars and complained that he hasn't slept in, \"years\".  He reports using PCP, \"maybe meth, maybe heroin, alcohol\", but was unable to state his usage patterns or history.  He appeared paranoid and reported that he doesn't trust anyone, \"I fight the law and the law fight me too\".  He is supposed to be attending [employer] but did not follow up after his last inpatient stay in February at [facility].  He is agreeable to being referred back to inpatient psych upon discharge and has already been in 46 acute hospitals since 2007.      Diagnosis  Schizoaffective dis bipolar type vs BAFD with psychosis severe episode manic        Plan     Treatment recommendations     Additional work up/ labs- reviewed   QTc 450 ms, trend CPK/ 1839 down to 1252   plts ok   Trend LFTs and cbc / plts     Safety/ observation level  Given risk assessment after interview and given clinical judgement- continue one 2 one     Psychotropic medication management-  Restart haldol 5 mg bid   Add valproic acid 250 mg now one time dose, then 1000 mg ER at hs for mood stabilization  Have prn haldol 5 mg with lorazepam 2 mg IM or PO for severe agitation or aggression, impulsivity     Ideally needs long acting injectable antipsychotic - haldol for ex for psychosis/ mood stabilization given hx of non compliance       Disposition/ level of care- needs inpt psych care given inability to care for self and overt lupe with psychosis   Willing to sign 201 for voluntary admission to psych - hydrate well, CPK needs to be under 600-800 for psych transfer    Risks and benefits of " treatment discussed and reviewed.   Thank you for consult request. Please call our service if condition changes or any questions.  More than 50% of time spent on seeing/ counseling the patient and coordination of care/ including but not limited to chart review, history taking, entering orders when appropriate, documenting. Time spent 51 min.    Med resident student and crisis SW were all present in the room

## 2021-06-17 NOTE — H&P
"   Hospital Medicine Service -  History & Physical        CHIEF COMPLAINT   \"I need to detox\"  \"I have thoughts about killing myself and killing anything/everything\".     HISTORY OF PRESENT ILLNESS      Sanchez Wood is a 41 y.o. male with unknown past medical history [facility]    Patient seen in ED room 9 at 0545.     The patient is an extremely poor historian at the time my evaluation secondary to possibly psychiatric illness and possible substance abuse/intoxication from unknown agent.    The patient's history of present illness was supplemented by my discussion with the emergency room physician Dr. Cook and from the emergency room physician assistant Daniel Jimenez.    Per my discussion with the emergency room providers the patient was brought in by EMS.  The patient is reportedly homeless.  The patient reportedly stated that he had dipped cigarettes into some substance and smoked it tonight.  The patient is unable to provide any other history.  He reported told the ED providers that he wanted to \"detox from everything\" but would not state the use of any illicits.  During my evaluation the patient admitted to history of tobacco abuse, but denied marijuana, cocaine, or IV drugs.  He denied PCP use to the emergency room providers however stated to me that he may have used PCP however could not tell me the last time he used this. Also admitted to both suicidal and homicidal ideation however was not able to articulate a plan.     At the time my evaluation the patient is pending additional work up by the emergency department: Urine analysis, urine drug panel, chest x-ray, EKG, COVD19 results. They are also awaiting a callback from social work.      PAST MEDICAL AND SURGICAL HISTORY        I am unable to obtain a past medical or surgical history due to the patient's altered mental status     PCP: Rayne Drake MD    MEDICATIONS      Prior to Admission medications    Not on File "       ALLERGIES      Morphine and Oxycodone    FAMILY HISTORY      Unable to review the patient's family history with the patient due to the patient's altered mental status    SOCIAL HISTORY      Social History     Socioeconomic History   • Marital status: Single     Spouse name: None   • Number of children: None   • Years of education: None   • Highest education level: None   Occupational History   • None   Tobacco Use   • Smoking status: Current Every Day Smoker   Substance and Sexual Activity   • Alcohol use: Yes   • Drug use: None     Comment: pt unwilling to answer   • Sexual activity: None   Other Topics Concern   • None   Social History Narrative   • None     Social Determinants of Health     Financial Resource Strain:    • Difficulty of Paying Living Expenses:    Food Insecurity:    • Worried About Running Out of Food in the Last Year:    • Ran Out of Food in the Last Year:    Transportation Needs:    • Lack of Transportation (Medical):    • Lack of Transportation (Non-Medical):    Physical Activity:    • Days of Exercise per Week:    • Minutes of Exercise per Session:    Stress:    • Feeling of Stress :    Social Connections:    • Frequency of Communication with Friends and Family:    • Frequency of Social Gatherings with Friends and Family:    • Attends Denominational Services:    • Active Member of Clubs or Organizations:    • Attends Club or Organization Meetings:    • Marital Status:    Intimate Partner Violence:    • Fear of Current or Ex-Partner:    • Emotionally Abused:    • Physically Abused:    • Sexually Abused:        REVIEW OF SYSTEMS      The patient is unable to provide review of systems at the time my evaluation due to altered mental status    PHYSICAL EXAMINATION      Temp:  [36.7 °C (98.1 °F)] 36.7 °C (98.1 °F)  Heart Rate:  [77] 77  Resp:  [16] 16  BP: (119)/(68) 119/68  There is no height or weight on file to calculate BMI.    Physical Exam  Constitutional:       General: He is not in acute  distress.     Appearance: Normal appearance. He is not ill-appearing, toxic-appearing or diaphoretic.      Comments: Young man laying in hospital stretcher  He is tremulous  He is awake and alert however he is only oriented to person; states that he is at [facility]   HENT:      Head: Normocephalic and atraumatic.      Right Ear: External ear normal.      Left Ear: External ear normal.      Nose: Nose normal. No congestion or rhinorrhea.      Mouth/Throat:      Mouth: Mucous membranes are dry.   Eyes:      Extraocular Movements: Extraocular movements intact.      Pupils: Pupils are equal, round, and reactive to light.   Pulmonary:      Effort: Pulmonary effort is normal.      Breath sounds: Normal breath sounds.   Abdominal:      General: Abdomen is flat.      Palpations: Abdomen is soft.   Musculoskeletal:      Cervical back: Normal range of motion.      Comments: The patient is tremulous in all extremities.   Skin:     General: Skin is warm and dry.   Neurological:      Mental Status: He is alert. He is disoriented.         LABS / IMAGING / EKG        Labs  Recent Results (from the past 24 hour(s))   ECG 12 lead    Collection Time: 06/17/21  4:12 AM   Result Value Ref Range    Ventricular rate 67     Atrial rate 67     WV Interval 136     QRS duration 82     QT Interval 426     QTC Calculation(Bazett) 450     P Axis 31     R Axis 27     T Wave Axis 4    CBC and differential    Collection Time: 06/17/21  4:15 AM   Result Value Ref Range    WBC 7.65 3.80 - 10.50 K/uL    RBC 5.70 4.50 - 5.80 M/uL    Hemoglobin 15.0 13.7 - 17.5 g/dL    Hematocrit 44.3 40.1 - 51.0 %    MCV 77.7 (L) 83.0 - 98.0 fL    MCH 26.3 (L) 28.0 - 33.2 pg    MCHC 33.9 32.2 - 36.5 g/dL    RDW 14.6 (H) 11.6 - 14.4 %    Platelets 260 150 - 350 K/uL    MPV 11.0 9.4 - 12.4 fL    Differential Type Auto     nRBC 0.0 <=0.0 %    Immature Granulocytes 0.1 %    Neutrophils 72.7 %    Lymphocytes 15.6 %    Monocytes 9.9 %    Eosinophils 1.3 %     Basophils 0.4 %    Immature Granulocytes, Absolute 0.01 0.00 - 0.08 K/uL    Neutrophils, Absolute 5.56 1.70 - 7.00 K/uL    Lymphocytes, Absolute 1.19 (L) 1.20 - 3.50 K/uL    Monocytes, Absolute 0.76 0.30 - 1.00 K/uL    Eosinophils, Absolute 0.10 0.04 - 0.54 K/uL    Basophils, Absolute 0.03 0.01 - 0.10 K/uL   ER toxicology screen, serum    Collection Time: 06/17/21  4:15 AM   Result Value Ref Range    Salicylate <4.0 <=30.0 mg/dL    Acetaminophen <10.0 (L) 10.0 - 30.0 ug/mL    Ethanol <5 <=10 mg/dL   Magnesium    Collection Time: 06/17/21  4:15 AM   Result Value Ref Range    Magnesium 2.0 1.8 - 2.5 mg/dL   Comprehensive metabolic panel    Collection Time: 06/17/21  4:15 AM   Result Value Ref Range    Sodium 141 136 - 144 mEQ/L    Potassium 3.9 3.6 - 5.1 mEQ/L    Chloride 103 98 - 109 mEQ/L    CO2 28 22 - 32 mEQ/L    BUN 8 8 - 20 mg/dL    Creatinine 1.1 0.8 - 1.3 mg/dL    Glucose 96 70 - 99 mg/dL    Calcium 9.4 8.9 - 10.3 mg/dL    AST (SGOT) 75 (H) 15 - 41 IU/L    ALT (SGPT) 57 16 - 63 IU/L    Alkaline Phosphatase 56 35 - 126 IU/L    Total Protein 7.1 6.0 - 8.2 g/dL    Albumin 3.9 3.4 - 5.0 g/dL    Bilirubin, Total 0.7 0.3 - 1.2 mg/dL    eGFR >60.0 >=60.0 mL/min/1.73m*2    Anion Gap 10 3 - 15 mEQ/L   CK, Total    Collection Time: 06/17/21  4:15 AM   Result Value Ref Range    Total CK 1,839 (H) 16 - 300 U/L     I have personally reviewed the patient's labs as listed above    No results found for: COVID19    Imaging  No imaging studies available to review at the time of my evaluation      ECG/Telemetry  There is no EKG for me to review at the time of my evaluation    ASSESSMENT AND PLAN           * Altered mental status  Assessment & Plan  Unclear if secondary to acute intoxication/substance abuse vs acute psychiatric illness versus a combination of this  Crisis and psychiatry have been consulted  Will likely require inpatient psychiatric placement once medically cleared  Barriers to medical clearance at this time:  Elevated CPK    Elevated CPK  Assessment & Plan  Likely related to substance abuse  Aggressive IV fluids  Trend      Homicidal ideation  Assessment & Plan  The patient made vague threats about killing anyone or anything during our evaluation  He did not name any specific person nor was he able to articulate a plan  Continue one-to-one  I discussed my concerns with Dr. Cook    Suicidal ideation  Assessment & Plan  Reported SI to ED providers  1:1 ordered  Crisis and psychiatry consulted    Elevated AST (SGOT)  Assessment & Plan  Likely related to elevated CPK vs possible EtOH use  Check EtOH levels  CIWA  Trend LFTs    Substance abuse (CMS/HCC)  Assessment & Plan  Unclear history of substance abuse  Patient intimates he dipped some substance and smoked it but unable to tell us what  UDS ordered and pending  Crisis and psychiatry consulted       VTE Assessment: Padua    VTE Prophylaxis: Current anticoagulants:    •None      Code Status: Full Code      Estimated Discharge Date:   Disposition Planning: Pending clearance of elevated CPK to < 800 and psychiatric evaluation     Jacob Alvarez MD  6/17/2021

## 2021-06-17 NOTE — ED PROVIDER NOTES
"Emergency Medicine Note  HPI   HISTORY OF PRESENT ILLNESS     This is a 41-year-old homeless man presenting to the emergency department via EMS with multiple complaints.  He admits to dipping cigarettes into his substance and smoking tonight before the ER visit.  He is a very poor historian, telling me that he has pain all over his body, has had thoughts of harming himself, and is requesting \"detox from everything\".  He is a very limited historian really offers not much else at this time.            Patient History   PAST HISTORY     Reviewed from Nursing Triage:      History reviewed. No pertinent past medical history.    History reviewed. No pertinent surgical history.    History reviewed. No pertinent family history.    Social History     Tobacco Use   • Smoking status: Current Every Day Smoker   Substance Use Topics   • Alcohol use: Yes   • Drug use: Not on file     Comment: pt unwilling to answer         Review of Systems   REVIEW OF SYSTEMS     Review of Systems   Constitutional: Negative for activity change, diaphoresis and fever.   HENT: Negative for facial swelling and sore throat.    Eyes: Negative for visual disturbance.   Respiratory: Negative for cough, shortness of breath and wheezing.    Cardiovascular: Negative for chest pain and leg swelling.   Gastrointestinal: Negative for abdominal pain, diarrhea, nausea and vomiting.   Genitourinary: Negative for difficulty urinating and hematuria.   Musculoskeletal: Positive for myalgias. Negative for back pain and neck pain.   Skin: Negative for color change and pallor.   Neurological: Negative for seizures, syncope, speech difficulty, weakness and headaches.   Psychiatric/Behavioral: Positive for dysphoric mood and suicidal ideas. Negative for agitation, behavioral problems, self-injury and sleep disturbance. The patient is nervous/anxious.    All other systems reviewed and are negative.        VITALS     ED Vitals    Date/Time Temp Pulse Resp BP SpO2 Who "   06/17/21 0546 -- 67 18 117/78 98 % SEN   06/17/21 0340 36.7 °C (98.1 °F) 77 16 119/68 100 % EVW        Pulse Ox %: 100 % (06/17/21 0530)  Pulse Ox Interpretation: Normal (06/17/21 0530)           Physical Exam   PHYSICAL EXAM     Physical Exam  Vitals and nursing note reviewed.   Constitutional:       Appearance: He is well-developed.   HENT:      Head: Normocephalic and atraumatic.   Eyes:      Conjunctiva/sclera: Conjunctivae normal.   Cardiovascular:      Rate and Rhythm: Normal rate and regular rhythm.   Pulmonary:      Effort: Pulmonary effort is normal.      Breath sounds: Normal breath sounds.   Abdominal:      General: There is no distension.      Palpations: Abdomen is soft. There is no mass.      Tenderness: There is no abdominal tenderness.   Musculoskeletal:         General: No tenderness or deformity. Normal range of motion.      Cervical back: Normal range of motion.   Skin:     General: Skin is warm and dry.   Neurological:      General: No focal deficit present.      Mental Status: He is alert. Mental status is at baseline.   Psychiatric:         Behavior: Behavior normal.           PROCEDURES     Procedures     DATA     Results     Procedure Component Value Units Date/Time    SARS-CoV-2 (COVID-19), PCR Nasopharynx [[id number]] Collected: 06/17/21 0530    Specimen: Nasopharyngeal Swab from Nasopharynx Updated: 06/17/21 0540    Narrative:      The following orders were created for panel order SARS-CoV-2 (COVID-19), PCR Nasopharynx.  Procedure                               Abnormality         Status                     ---------                               -----------         ------                     SARS-CoV-2 (COVID-19), P...[[id number]]                      In process                   Please view results for these tests on the individual orders.    SARS-CoV-2 (COVID-19), PCR Nasopharynx [[id number]] Collected: 06/17/21 0530    Specimen: Nasopharyngeal Swab from Nasopharynx Updated: 06/17/21 0540     ER toxicology screen, serum [[id number]]  (Abnormal) Collected: 06/17/21 0415    Specimen: Blood, Venous Updated: 06/17/21 0458     Salicylate <4.0 mg/dL      Acetaminophen <10.0 ug/mL      Ethanol <5 mg/dL     CK, Total [[id number]]  (Abnormal) Collected: 06/17/21 0415    Specimen: Blood, Venous Updated: 06/17/21 0458     Total CK 1,839 U/L     Magnesium [[id number]]  (Normal) Collected: 06/17/21 0415    Specimen: Blood, Venous Updated: 06/17/21 0457     Magnesium 2.0 mg/dL     Comprehensive metabolic panel [[id number]]  (Abnormal) Collected: 06/17/21 0415    Specimen: Blood, Venous Updated: 06/17/21 0457     Sodium 141 mEQ/L      Potassium 3.9 mEQ/L      Comment: Results obtained on plasma. Plasma Potassium values may be up to 0.4 mEQ/L less than serum values. The differences may be greater for patients with high platelet or white cell counts.        Chloride 103 mEQ/L      CO2 28 mEQ/L      BUN 8 mg/dL      Creatinine 1.1 mg/dL      Glucose 96 mg/dL      Calcium 9.4 mg/dL      AST (SGOT) 75 IU/L      ALT (SGPT) 57 IU/L      Alkaline Phosphatase 56 IU/L      Total Protein 7.1 g/dL      Comment: Test performed on plasma which typically contains approximately 0.4 g/dL more protein than serum.        Albumin 3.9 g/dL      Bilirubin, Total 0.7 mg/dL      eGFR >60.0 mL/min/1.73m*2      Anion Gap 10 mEQ/L     CBC and differential [[id number]]  (Abnormal) Collected: 06/17/21 0415    Specimen: Blood, Venous Updated: 06/17/21 0423     WBC 7.65 K/uL      RBC 5.70 M/uL      Hemoglobin 15.0 g/dL      Hematocrit 44.3 %      MCV 77.7 fL      MCH 26.3 pg      MCHC 33.9 g/dL      RDW 14.6 %      Platelets 260 K/uL      MPV 11.0 fL      Differential Type Auto     nRBC 0.0 %      Immature Granulocytes 0.1 %      Neutrophils 72.7 %      Lymphocytes 15.6 %      Monocytes 9.9 %      Eosinophils 1.3 %      Basophils 0.4 %      Immature Granulocytes, Absolute 0.01 K/uL      Neutrophils, Absolute 5.56 K/uL      Lymphocytes, Absolute 1.19  K/uL      Monocytes, Absolute 0.76 K/uL      Eosinophils, Absolute 0.10 K/uL      Basophils, Absolute 0.03 K/uL           Imaging Results          X-RAY CHEST 1 VIEW (In process)                 ECG 12 lead   ED Interpretation   Normal sinus rhythm at 67 bpm.  Normal P waves, normal TN intervals, normal QRS segments, normal R-wave progression, no axis deviation, no ST or T-wave abnormalities.            Scoring tools                                 ED Course & MDM   MDM / ED COURSE and CLINICAL IMPRESSIONS     MDM    ED Course as of Jun 17 0603   u Jun 17, 2021   0340 During triage, nursing initiated suicide precautions, history of inpatient close possessions in a safe room with one-to-one observation.    [DB]   0528 Case was discussed with hospitalist.  Reviewed patient's presentation, ED course, and relevant data.  Hospitalist accepts patient on their service and will see / admit pt.    [DB]      ED Course User Index  [DB] Daniel Jimenez PA C         Clinical Impressions as of Jun 17 0603   Non-traumatic rhabdomyolysis   Substance abuse (CMS/HCC)   Suicidal ideation            Daniel Jimenez PA C  06/17/21 0603

## 2021-06-17 NOTE — ED ATTESTATION NOTE
"I have personally seen and examined the patient.   EHR/RN and PA hx reviewed    I reviewed and agree with the PA/NP's assessment and plan of care. Defer to my note for any discrepancies b/t us.     My examination, assessment, and plan of care of Sanchez Wood is as follows:    HPI-  Having hallucinations. Having thoughts of hi and si; c/o body aches;  Affirms \"yes\" when asked about recent cocaine use.    PE-  G- nad  Ext- no le assymetry/cords  psyh- blunted affect, poor eye contact, fidgetting w/ his L hand (tugging and gripping pants) during exam.    Data reviewed  CPK 2K    [employer]/ivf/admitted for further mangaement    I was physically present for the key/critical portions of the following procedures: None           Oren Cook MD  06/17/21 3143    "

## 2021-06-17 NOTE — ASSESSMENT & PLAN NOTE
Intoxicated on pcp at the time of arrival and admission  Will hopefully wain with withdrawal of pcp  -monitor

## 2021-06-17 NOTE — ASSESSMENT & PLAN NOTE
Likely related to substance abuse  Aggressive IV fluids, 200cc/hr for now  Trend to get CK < 800 then can go to inpatient psych.

## 2021-06-18 VITALS
SYSTOLIC BLOOD PRESSURE: 136 MMHG | HEART RATE: 83 BPM | RESPIRATION RATE: 18 BRPM | TEMPERATURE: 97 F | DIASTOLIC BLOOD PRESSURE: 84 MMHG | OXYGEN SATURATION: 98 %

## 2021-06-18 PROBLEM — R45.850 HOMICIDAL IDEATION: Status: RESOLVED | Noted: 2021-06-17 | Resolved: 2021-06-18

## 2021-06-18 PROBLEM — F22 DELUSION (CMS/HCC): Status: ACTIVE | Noted: 2021-06-18

## 2021-06-18 PROBLEM — R74.01 ELEVATED AST (SGOT): Status: RESOLVED | Noted: 2021-06-17 | Resolved: 2021-06-18

## 2021-06-18 PROBLEM — R45.851 SUICIDAL IDEATION: Status: RESOLVED | Noted: 2021-06-17 | Resolved: 2021-06-18

## 2021-06-18 PROBLEM — F19.921: Status: RESOLVED | Noted: 2021-06-17 | Resolved: 2021-06-18

## 2021-06-18 LAB
ANION GAP SERPL CALC-SCNC: 6 MEQ/L (ref 3–15)
BUN SERPL-MCNC: 5 MG/DL (ref 8–20)
CALCIUM SERPL-MCNC: 8.2 MG/DL (ref 8.9–10.3)
CHLORIDE SERPL-SCNC: 108 MEQ/L (ref 98–109)
CK SERPL-CCNC: 491 U/L (ref 16–300)
CK SERPL-CCNC: 530 U/L (ref 16–300)
CO2 SERPL-SCNC: 26 MEQ/L (ref 22–32)
CREAT SERPL-MCNC: 0.9 MG/DL (ref 0.8–1.3)
GFR SERPL CREATININE-BSD FRML MDRD: >60 ML/MIN/1.73M*2
GLUCOSE SERPL-MCNC: 81 MG/DL (ref 70–99)
POTASSIUM SERPL-SCNC: 4.1 MEQ/L (ref 3.6–5.1)
SODIUM SERPL-SCNC: 140 MEQ/L (ref 136–144)

## 2021-06-18 PROCEDURE — 63700000 HC SELF-ADMINISTRABLE DRUG: Performed by: STUDENT IN AN ORGANIZED HEALTH CARE EDUCATION/TRAINING PROGRAM

## 2021-06-18 PROCEDURE — 25000000 HC PHARMACY GENERAL: Performed by: HOSPITALIST

## 2021-06-18 PROCEDURE — 96366 THER/PROPH/DIAG IV INF ADDON: CPT

## 2021-06-18 PROCEDURE — 63600000 HC DRUGS/DETAIL CODE: Performed by: HOSPITALIST

## 2021-06-18 PROCEDURE — 99217 PR OBSERVATION CARE DISCHARGE MANAGEMENT: CPT | Performed by: STUDENT IN AN ORGANIZED HEALTH CARE EDUCATION/TRAINING PROGRAM

## 2021-06-18 PROCEDURE — G0378 HOSPITAL OBSERVATION PER HR: HCPCS

## 2021-06-18 PROCEDURE — 36415 COLL VENOUS BLD VENIPUNCTURE: CPT | Performed by: HOSPITALIST

## 2021-06-18 PROCEDURE — 25800000 HC PHARMACY IV SOLUTIONS: Performed by: HOSPITALIST

## 2021-06-18 PROCEDURE — 82550 ASSAY OF CK (CPK): CPT | Mod: 91 | Performed by: HOSPITALIST

## 2021-06-18 PROCEDURE — 80048 BASIC METABOLIC PNL TOTAL CA: CPT | Performed by: HOSPITALIST

## 2021-06-18 RX ORDER — HALOPERIDOL 5 MG/1
5 TABLET ORAL 2 TIMES DAILY
Qty: 60 TABLET | Refills: 0
Start: 2021-06-18 | End: 2021-07-18

## 2021-06-18 RX ORDER — DIVALPROEX SODIUM 500 MG/1
1000 TABLET, FILM COATED, EXTENDED RELEASE ORAL NIGHTLY
Qty: 60 TABLET | Refills: 0
Start: 2021-06-18 | End: 2021-07-18

## 2021-06-18 RX ADMIN — HALOPERIDOL 5 MG: 5 TABLET ORAL at 09:52

## 2021-06-18 RX ADMIN — THIAMINE HYDROCHLORIDE 400 MG: 100 INJECTION, SOLUTION INTRAMUSCULAR; INTRAVENOUS at 05:17

## 2021-06-18 RX ADMIN — SODIUM CHLORIDE, SODIUM LACTATE, POTASSIUM CHLORIDE, CALCIUM CHLORIDE: 600; 310; 30; 20 INJECTION, SOLUTION INTRAVENOUS at 04:20

## 2021-06-18 RX ADMIN — SODIUM CHLORIDE, SODIUM LACTATE, POTASSIUM CHLORIDE, CALCIUM CHLORIDE: 600; 310; 30; 20 INJECTION, SOLUTION INTRAVENOUS at 08:51

## 2021-06-18 RX ADMIN — FOLIC ACID 1 MG: 5 INJECTION, SOLUTION INTRAMUSCULAR; INTRAVENOUS; SUBCUTANEOUS at 08:20

## 2021-06-18 RX ADMIN — THIAMINE HYDROCHLORIDE 400 MG: 100 INJECTION, SOLUTION INTRAMUSCULAR; INTRAVENOUS at 15:10

## 2021-06-18 NOTE — BEHAVIORAL HEALTH CRISIS PROGRESS NOTE
PCP    RAYNE DRAKE  Patient Information    Patient Name Address Race   Sanchez Wood [address] [facility] [address] Black or    Patient Legal Name Legal Sex Date of Birth   Sanchez Wood Male 1980   Room Ethnic Group Language   0210 Not , /a, or Sami origin English   MRN Phone Numbers PCP   [id number] Hm: [phone] Rayne Drake MD   Emergency Contact(s)    None on File   Documents Filed to Patient    Power of  Living Will Clinical Unknown Study Attachment Consent Form ABN Waiver After Visit Summary Lab Result Scan Code Status [facility] MyChart Status Advance Care Planning   Not on File Not on File Not on File Not on File Not on File Not on File Not on File Not on File FULL [Updated on 06/17/21 0533] Pending Jump to the Activity    Auth/Cert Information    Open Auth/Cert linked to Hospital Account [id number]   Bed Days    Further reviews needed: Yes    Open utilization review      Primary Coverage: MEDICARE/MEDICARE PART A & B  Next review: None     No nights documented for this coverage.    Update bed days for this coverage      Secondary Coverage: [facility]  Next review: None     No nights documented for this coverage.    Update bed days for this coverage   Admission Information    Current Information    Attending Provider Admitting Provider Admission Type Admission Status   Daniel Howard DO Choe, Michael G, MD Urgent Confirmed Admission          Admission Date/Time Discharge Date Hospital Service Auth/Cert Status   06/17/21  03:22 AM  Internal Medicine Incomplete          Hospital Area Unit Room/Bed    [facility] 2ROS 0210/0210            Hospital Account    Name Acct ID Class Status Primary Coverage   Sanchez Wood [id number] Observation Open MEDICARE - MEDICARE PART A & B          Guarantor Account (for Hospital Account #[id number])    Name Relation to Pt Service Area Active? Acct Type  "  Sanchez Wood Self [facility] Yes Personal/Family   Address Phone     [address] [place] [address] [phone](H)            Coverage Information (for Hospital Account #[id number])    1. MEDICARE/MEDICARE PART A & B    F/O Payor/Plan Precert #   MEDICARE/MEDICARE PART A & B    Subscriber Subscriber #   Sanchez Wood [id number]   Address Phone   [address]   [place] [address] [phone]   2. [facility]    F/O Payor/Plan Precert #   [facility]    Subscriber Subscriber #   Sanchez Wood [id number]   Address Phone   [address]   [place] [address] [phone]     Daniel Howard DO   Physician   Internal Medicine   Progress Notes       Signed   Date of Service:  6/18/2021  8:04 AM               Signed             []Hide copied text    []Naveedver for details      Hospital Medicine Service -  Daily Progress Note             SUBJECTIVE    Interval History: Awake this morning, has no complaints but has his head covered with the sheets. Not disagreeable or combative. No issues overnight. Refused labs initially. Upon asking him why we can't he stated that \"why don't you take it all out and do an autopsy\". He then asked about some made up substance in his blood. He is agreeable to having his blood drawn. Oddly, he is unclear about certain aspects of his care but very clear about the 201 having been signed.      CK fell to 926 overnight. As long as CK < 900 this AM, he is cleared medically to go to inpatient psych.        OBJECTIVE       Vital signs in last 24 hours:  Temp:  [36.1 °C (96.9 °F)-36.7 °C (98 °F)] 36.7 °C (98 °F)  Heart Rate:  [56-80] 63  Resp:  [16-18] 18  BP: (115-120)/(60-71) 115/71  No intake or output data in the 24 hours ending 06/18/21 0804     PHYSICAL EXAMINATION       Physical Exam  Constitutional:       Appearance: Normal appearance.   Neck:      Comments: Scar on the right anterior neck  Cardiovascular:      Rate and Rhythm: " Normal rate and regular rhythm.      Pulses: Normal pulses.      Heart sounds: Normal heart sounds.   Pulmonary:      Effort: Pulmonary effort is normal. No respiratory distress.      Breath sounds: Normal breath sounds. No stridor. No wheezing or rhonchi.   Abdominal:      General: Abdomen is flat. Bowel sounds are normal. There is no distension.      Palpations: Abdomen is soft. There is no mass.      Tenderness: There is no abdominal tenderness.   Musculoskeletal:         General: No swelling or tenderness.   Skin:     General: Skin is warm and dry.      Capillary Refill: Capillary refill takes less than 2 seconds.   Neurological:      General: No focal deficit present.      Mental Status: He is alert.   Psychiatric:         Mood and Affect: Affect normal.         Behavior: Behavior is not agitated. Behavior is cooperative.         Thought Content: Thought content is delusional.           LINES, CATHETERS, DRAINS, AIRWAYS, AND WOUNDS   Lines, Drains, and Airways:  Wounds (agree with documentation and present on admission):  Peripheral IV 06/17/21 Right Antecubital (Active)   Number of days: 1            Comments:       LABS / IMAGING / TELE       Labs  Labs are pending.         SARS-CoV-2 (COVID-19) (no units)   Date/Time Value   06/17/2021 0530 Negative         Imaging  I have independently reviewed the pertinent imaging from the last 24 hrs.     ECG/Telemetry  I have independently reviewed the telemetry. No events for the last 24 hours.     ASSESSMENT AND PLAN       Delusion (CMS/[facility])  Assessment & Plan  -unclear underlying psychiatric issue but has interesting delusions & states odd things to me about why we don't take all the blood and do an autopsy  -started haloperidol last night 5mg BID & valproic acid for mood stabilization.   -will clear for inpatient psych today.      Homicidal ideation  Assessment & Plan  No homicidal ideations to me or team today     Suicidal ideation  Assessment & Plan  Reported SI  "to ED providers  1:1 ordered  Crisis and psychiatry consulted     Elevated AST (SGOT)  Assessment & Plan  Due to rhabdo as above  Trend LFTs     Polysubstance abuse (CMS/HCC)  Assessment & Plan  -psychiatry seeing him today  -UDS positive for PCP & THC.   - plan for inpatient psych.      Non-traumatic rhabdomyolysis  Assessment & Plan  Likely related to substance abuse  Aggressive IV fluids, 200cc/hr for now  Trend to get CK < 800 then can go to inpatient psych.      * Drug intoxication with delirium (CMS/HCC)  Assessment & Plan  Intoxicated on pcp at the time of arrival and admission  Will hopefully wain with withdrawal of pcp  -monitor            VTE Assessment: Padua    VTE Prophylaxis:  Current anticoagulants:     •None        Code Status: Full Code      Estimated Discharge Date: 6/18/2021   Disposition Planning: inpatient psych      Daniel Howard,   6/18/2021         Nikki Peck MD   Physician   Psychiatry   Consults       Addendum   Date of Service:  6/17/2021  2:05 PM            Consult Orders   Inpatient consult to Psychiatry [[id number]] ordered by Jacob Alvarez MD at 06/17/21 0530          Addendum        Expand AllCollapse All      []Hide copied text    []Hover for details  Psychiatry Consult     Diagnosis: Suicidal ideation [R45.851]  Substance abuse (CMS/HCC) [F19.10]  Elevated CPK [R74.8]  Non-traumatic rhabdomyolysis [M62.82].  Chief Complaint:       Chief Complaint   Patient presents with   • Altered Mental Status   • Lower Extremity Issue         Subjective         Sanchez Wood is a 41 y.o. male with unknown past medical history [facility]  The patient is a  poor historian at the time evaluation secondary to possibly psychiatric illness and possible substance abuse/intoxication from unknown agent.  Per primary team providers, the patient was brought in by EMS.  The patient is reportedly homeless but later endorsing that he lives \" everywhere, hotels, rooms \" " ".  The patient reportedly stated that he had dipped cigarettes into some substance and smoked it before admission- UDS was pos for PCP and THC- he is known to use K2 in the past.  The patient is unable to provide any meaningful history.  He reported told the ED providers that he wanted to \"detox from everything\" but would not state the use of any illicits.  He denied PCP use to the emergency room providers however stated to me that he may have used PCP however could not tell me the last time he used this. Also admitted to both suicidal and homicidal ideation however was not able to articulate a plan.   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 He also added that he is not sleeping, hat he is very productive and can take care of himself. That he owns \" 5, no, 10 to 15 cars\"    Then next talked about \" there is something wrong with the bread, I don't know, it is hard but also soft \" without any logical connection to the questions we were asking. Then saying he does not think we are real doctos \" why should I talk to you, \", adding that there are a lot of people like him around\" I keep multiplying \"   He also reports once instance when he tried to cut his wrist and then he was also \" operated on, I had all these cancers and tumors\"- has an old scar on hsi neck ?  Denies death wish but is very disorganized and grandiose, pressured with FOI, racing thoughts   Admits to hallucinations, both visual and aud , vague about content   PDMP: none reported by query      Collateral information: from [facility], multiple inp tpsych admission, most recent at [facility] in feb 2021. Also at [facility] last year almost 50 recent inpt padmission  He does not know if he is still taking meds at this time, reports \" they don'ts work anyway \" "      Psychiatric History:        Suicidality- denies to us any thoughts, plans or intent but had endorsed suicidality earlier   Suicide Attempts: yes - reports once? But he is poor historian      Risk Factors: Alcohol and/or substance abuse and Impulsive or aggressive tendencies      Protective Factors:   Internal - has fair coping skills   External - limited support        Connectedness to individuals, family, community, and social institutions   Current Psychiatrist: unknown  Past psychiatric Hospitalization: yes - multiple   Medication Trials: mentions haloperidol, risperidone, olanzapine to name only a few; also sertraline      Agitation, impulsivity or aggression - evasive  Legal hx- unk     Substance Use History:  Substance use: PCP, THC and possible K2   Past D&A Treatment: unk     Family History: History reviewed. No pertinent family history.     Social History:   Social History   Social History            Socioeconomic History   • Marital status: Single       Spouse name: None   • Number of children: None   • Years of education: None   • Highest education level: None   Occupational History   • None   Tobacco Use   • Smoking status: Current Every Day Smoker   Substance and Sexual Activity   • Alcohol use: Yes   • Drug use: None       Comment: pt unwilling to answer   • Sexual activity: None   Other Topics Concern   • None   Social History Narrative   • None      Social Determinants of Health          Financial Resource Strain:    • Difficulty of Paying Living Expenses:    Food Insecurity:    • Worried About Running Out of Food in the Last Year:    • Ran Out of Food in the Last Year:    Transportation Needs:    • Lack of Transportation (Medical):    • Lack of Transportation (Non-Medical):    Physical Activity:    • Days of Exercise per Week:    • Minutes of Exercise per Session:    Stress:    • Feeling of Stress :    Social Connections:    • Frequency of Communication with Friends and Family:    • Frequency  of Social Gatherings with Friends and Family:    • Attends Judaism Services:    • Active Member of Clubs or Organizations:    • Attends Club or Organization Meetings:    • Marital Status:    Intimate Partner Violence:    • Fear of Current or Ex-Partner:    • Emotionally Abused:    • Physically Abused:    • Sexually Abused:          Employment- unclear   Education- unk  Stressors- poor compliance  Strength/ supports- limited       Medical History:   Medical History   History reviewed. No pertinent past medical history.        Allergies:        Allergies   Allergen Reactions   • Morphine     • Oxycodone           Current Medications:  HOME MEDS:     SCHEDULED:  • folic acid  1 mg intravenous Daily     Followed by   • [START ON 6/20/2021] folic acid  1 mg oral Daily   • thiamine (VITAMIN B1) IVPB  400 mg intravenous q8h BROOKE     Followed by   • [START ON 6/20/2021] thiamine  100 mg oral Daily      PRN  •  atropine  •  glucose **OR** dextrose **OR** glucagon **OR** dextrose in water  •  nitroglycerin     Review of Systems  Review of systems not obtained due to patient factors.           Objective         Vital Signs for the last 24 hours:  Temp:  [36.1 °C (97 °F)-36.7 °C (98.1 °F)] 36.1 °C (97 °F)  Heart Rate:  [61-80] 80  Resp:  [16-18] 16  BP: (117-119)/(60-78) 117/60     Labs       Results from last 7 days   Lab Units 06/17/21  0415   HEMOGLOBIN g/dL 15.0   WBC K/uL 7.65   PLATELETS K/uL 260   POTASSIUM mEQ/L 3.9   SODIUM mEQ/L 141   CREATININE mg/dL 1.1           Results from last 7 days   Lab Units 06/17/21  0415   ALT IU/L 57   AST IU/L 75*   ALK PHOS IU/L 56            Ethanol   Date Value Ref Range Status   06/17/2021 <5 <=10 mg/dL Final            Lab Results   Component Value Date     TSH 2.86 02/27/2015      No results found for: PHENYTOIN, PHENOBARB, VALPROATE, CBMZ  No results found for: HDL, LDLCALC, TRIG, CHOL, HGBA1C  X-RAY CHEST 1 VIEW     Result Date: 6/17/2021  IMPRESSION: No active disease in the  "chest. COMMENT: The current portable study the chest demonstrates somewhat limited inspiratory result. There are no consolidating infiltrates. No pulmonary vascular congestion or edema is seen. No atelectasis or pleural effusions are demonstrated. The cardiomediastinal silhouette is within normal limits of size and configuration. The bony thorax is intact.        Mental Status Evaluation:  In bed, disheveled, avoidant eye contact, speech is pressed, loud, mood is good, affect is expansive, labile from frustrated to defensive to grandiose, his TP is distracted, some blocking but also FOI, he reports gramndiose delusions, paranoia, hallucinations that he did not want to elaborate on, denied to me SI but endorsed some in the ER earlier, admits to using drugs \" because no medication ever works for me \", oriented to person and place, kept saying it is august 21 st century despite being corrected numerous times atto correct date, limited I. Poor J            Assessment      Impression/ risk assessment   41 y.o. male being consulted for vague SI without a clear plan and use of PCP upon admission.  The patient presents with a disorganized thought process, stating that he initially lives alone, but is also homeless, pays rent but doesn't have stable housing, works as a lazo.  He reports that he has +AH , \"of course, who I am, what's my name, people call me many names\".  He is not oriented at this time and continuously stated that the month is, \"8, like august\", even after being informed that it was June.  He denied having any family or support, and reports drug use, \"when I can get them.  I take OTC meds, under the counter, everything, no psych meds\".  He has 2, 3 or 5 children, \"because they're multiplying out there\".  He also has 10-15 cars and complained that he hasn't slept in, \"years\".  He reports using PCP, \"maybe meth, maybe heroin, alcohol\", but was unable to state his usage patterns or history.  He appeared " "paranoid and reported that he doesn't trust anyone, \"I fight the law and the law fight me too\".  He is supposed to be attending [facility] but did not follow up after his last inpatient stay in February at [facility].  He is agreeable to being referred back to inpatient psych upon discharge and has already been in 46 acute hospitals since 2007.        Diagnosis  Schizoaffective dis bipolar type vs BAFD with psychosis severe episode manic               Plan         Treatment recommendations      Additional work up/ labs- reviewed   QTc 450 ms, trend CPK/ 1839 down to 1252   plts ok   Trend LFTs and cbc / plts      Safety/ observation level  Given risk assessment after interview and given clinical judgement- continue one 2 one      Psychotropic medication management-  Restart haldol 5 mg bid   Add valproic acid 250 mg now one time dose, then 1000 mg ER at hs for mood stabilization  Have prn haldol 5 mg with lorazepam 2 mg IM or PO for severe agitation or aggression, impulsivity      Ideally needs long acting injectable antipsychotic - haldol for ex for psychosis/ mood stabilization given hx of non compliance         Disposition/ level of care- needs inpt psych care given inability to care for self and overt lupe with psychosis   Willing to sign 201 for voluntary admission to psych - hydrate well, CPK needs to be under 600-800 for psych transfer     Risks and benefits of treatment discussed and reviewed.   Thank you for consult request. Please call our service if condition changes or any questions.  More than 50% of time spent on seeing/ counseling the patient and coordination of care/ including but not limited to chart review, history taking, entering orders when appropriate, documenting. Time spent 51 min.    Med resident student and crisis SW were all present in the room                      Revision History                            Ingrid Pedraza, [facility]   Behavioral Health Crisis Clinician   Behavioral Health  " "  Consults       Signed   Date of Service:  6/17/2021 12:08 PM            Consult Orders   Inpatient consult to Behavioral Health Crisis [[id number]] ordered by Jacob Alvarez MD at 06/17/21 0536   Inpatient consult to Social Work [[id number]] ordered by Oren Cook MD at 06/17/21 0345          Signed        Expand AllCollapse All      []Hide copied text    []Hover for details  Behavioral Health Crisis Clinician Note     Reason for Consult     BH SW was consulted due to the patient presenting with +SI without a clear plan and use of PCP.      Presenting Problem  Suicidal ideation [R45.851]  Substance abuse (CMS/[facility]) [F19.10]  Elevated CPK [R74.8]  Non-traumatic rhabdomyolysis [M62.82]      Interval History  Sanchez Wood is a 41 y.o. male who was admitted with unknown past medical history who was brought in by EMS tonight.     The patient is an extremely poor historian at the time my evaluation secondary to possibly psychiatric illness and possible substance abuse/intoxication from unknown agent.     The patient's history of present illness was supplemented by my discussion with the emergency room physician Dr. Cook and from the emergency room physician assistant Daniel Jimenez.     Per my discussion with the emergency room providers the patient was brought in by EMS.  The patient is reportedly homeless.  The patient reportedly stated that he had dipped cigarettes into some substance and smoked it tonight.  The patient is unable to provide any other history.  He reported told the ED providers that he wanted to \"detox from everything\" but would not state the use of any illicit.  During my evaluation the patient admitted to history of tobacco abuse, but denied marijuana, cocaine, or IV drugs.  He denied PCP use to the emergency room providers however stated to me that he may have used PCP however could not tell me the last time he used this. Also admitted to both suicidal and homicidal ideation however " "was not able to articulate a plan.      Drug & Alcohol Assessment     Drug Amount Frequency Last Use   PCP 2 dippers unknown 2 days ago   Alcohol  unknown unknown 2 days ago                          Psychiatric Assessment  Suicide Attempts: yes - Pt reports a history of SA via cutting & other intentional OD's, \"I was experimenting on myself, my physical, my brain\".    Risk Factors: Alcohol and/or substance abuse, Easy access to lethal methods, Barriers to accessing treatment , Physical illness and Help rejecting  Protective Factors: Effective and accessible mental health care  and Connectedness to individuals, family, community, and social institutions   Current Psychiatrist: no  Past psychiatric Hospitalization: no  Medication Trials:  no  ECT trials: no     Mental Status Exam  · Appearance: unkempt and appropriate attire  · Speech: excessive, pressured and rapid  · Mood:   · Affect: restricted  · Suicidality/Homicidality: thoughts of being dead/ no desire or plan to die  · Thought Process: flight of ideas and tangential  · Thought Content: auditory hallucinations, visual hallucinations and paranoia  · Orientation: situation  · Cognition: impaired due to psychosis and non-compliance with medications   · Judgement/Insight: minimizes severity level of illness and makes choices that perpetuate illness     Review of Systems  Current Medications:  •  atropine, 0.5 mg, intravenous, q5 min PRN  •  glucose, 16-32 g of dextrose, oral, PRN **OR** dextrose, 15-30 g of dextrose, oral, PRN **OR** glucagon, 1 mg, intramuscular, PRN **OR** dextrose in water, 25 mL, intravenous, PRN  •  folic acid, 1 mg, intravenous, Daily **FOLLOWED BY** [START ON 6/20/2021] folic acid, 1 mg, oral, Daily  •  lactated ringer's, , intravenous, Continuous  •  nitroglycerin, 0.4 mg, sublingual, q5 min PRN  •  thiamine (VITAMIN B1) IVPB, 400 mg, intravenous, q8h BROOKE **FOLLOWED BY** [START ON 6/20/2021] thiamine, 100 mg, oral, Daily     Home " "Medications:           Objective         Vital Signs for the last 24 hours:  Temp:  [36.1 °C (97 °F)-36.7 °C (98.1 °F)] 36.1 °C (97 °F)  Heart Rate:  [61-80] 80  Resp:  [16-18] 16  BP: (117-119)/(60-78) 117/60     Assessment/Plan     41 y.o. male being consulted for vague SI without a clear plan and use of PCP upon admission.  The patient presents with a disorganized thought process, stating that he initially lives alone, but is also homeless, pays rent but doesn't have stable housing, works as a lazo.  He reports that he has +AH , \"of course, who I am, what's my name, people call me many names\".  He is not oriented at this time and continuously stated that the month is, \"8, like august\", even after being informed that it was June.  He denied having any family or support, and reports drug use, \"when I can get them.  I take OTC meds, under the counter, everything, no psych meds\".  He has 2, 3 or 5 children, \"because they're multiplying out there\".  He also has 10-15 cars and complained that he hasn't slept in, \"years\".  He reports using PCP, \"maybe meth, maybe heroin, alcohol\", but was unable to state his usage patterns or history.  He appeared paranoid and reported that he doesn't trust anyone, \"I fight the law and the law fight me too\".  He is supposed to be attending [facility] but did not follow up after his last inpatient stay in February at [facility].  He is agreeable to being referred back to inpatient psych upon discharge and has already been in 46 acute hospitals since 2007.  [facility] will continue to follow the patient's case & will work on placement once the patient is medically cleared & stable.    Ingrid Pedraza MSW, LSW  Pgr: [id number]                                                   Jacob Alvarez MD   Physician   Internal Medicine   H&P       Addendum   Date of Service:  6/17/2021  5:29 AM               Addendum        Expand AllCollapse All      []Hide copied text    []Hover for details      " "Hospital Medicine Service -  History & Physical          CHIEF COMPLAINT   \"I need to detox\"  \"I have thoughts about killing myself and killing anything/everything\".      HISTORY OF PRESENT ILLNESS      Sanchez Wood is a 41 y.o. male with unknown past medical [facility]     Patient seen in ED room 9 at 0545.      The patient is an extremely poor historian at the time my evaluation secondary to possibly psychiatric illness and possible substance abuse/intoxication from unknown agent.     The patient's history of present illness was supplemented by my discussion with the emergency room physician Dr. Cook and from the emergency room physician assistant Daniel Jimenez.     Per my discussion with the emergency room providers the patient was brought in by EMS.  The patient is reportedly homeless.  The patient reportedly stated that he had dipped cigarettes into some substance and smoked it tonight.  The patient is unable to provide any other history.  He reported told the ED providers that he wanted to \"detox from everything\" but would not state the use of any illicits.  During my evaluation the patient admitted to history of tobacco abuse, but denied marijuana, cocaine, or IV drugs.  He denied PCP use to the emergency room providers however stated to me that he may have used PCP however could not tell me the last time he used this. Also admitted to both suicidal and homicidal ideation however was not able to articulate a plan.      At the time my evaluation the patient is pending additional work up by the emergency department: Urine analysis, urine drug panel, chest x-ray, EKG, COVD19 results. They are also awaiting a callback from social work.        PAST MEDICAL AND SURGICAL HISTORY         I am unable to obtain a past medical or surgical history due to the patient's altered mental status      PCP: Rayne Drake MD     MEDICATIONS      Prior to Admission medications    Not on " File         ALLERGIES      Morphine and Oxycodone     FAMILY HISTORY      Unable to review the patient's family history with the patient due to the patient's altered mental status     SOCIAL HISTORY      Social History   Social History             Socioeconomic History   • Marital status: Single       Spouse name: None   • Number of children: None   • Years of education: None   • Highest education level: None   Occupational History   • None   Tobacco Use   • Smoking status: Current Every Day Smoker   Substance and Sexual Activity   • Alcohol use: Yes   • Drug use: None       Comment: pt unwilling to answer   • Sexual activity: None   Other Topics Concern   • None   Social History Narrative   • None      Social Determinants of Health          Financial Resource Strain:    • Difficulty of Paying Living Expenses:    Food Insecurity:    • Worried About Running Out of Food in the Last Year:    • Ran Out of Food in the Last Year:    Transportation Needs:    • Lack of Transportation (Medical):    • Lack of Transportation (Non-Medical):    Physical Activity:    • Days of Exercise per Week:    • Minutes of Exercise per Session:    Stress:    • Feeling of Stress :    Social Connections:    • Frequency of Communication with Friends and Family:    • Frequency of Social Gatherings with Friends and Family:    • Attends Nondenominational Services:    • Active Member of Clubs or Organizations:    • Attends Club or Organization Meetings:    • Marital Status:    Intimate Partner Violence:    • Fear of Current or Ex-Partner:    • Emotionally Abused:    • Physically Abused:    • Sexually Abused:             REVIEW OF SYSTEMS      The patient is unable to provide review of systems at the time my evaluation due to altered mental status     PHYSICAL EXAMINATION      Temp:  [36.7 °C (98.1 °F)] 36.7 °C (98.1 °F)  Heart Rate:  [77] 77  Resp:  [16] 16  BP: (119)/(68) 119/68  There is no height or weight on file to calculate BMI.     Physical  Exam  Constitutional:       General: He is not in acute distress.     Appearance: Normal appearance. He is not ill-appearing, toxic-appearing or diaphoretic.      Comments: Young man laying in hospital stretcher  He is tremulous  He is awake and alert however he is only oriented to person; states that he is at [facility]   HENT:      Head: Normocephalic and atraumatic.      Right Ear: External ear normal.      Left Ear: External ear normal.      Nose: Nose normal. No congestion or rhinorrhea.      Mouth/Throat:      Mouth: Mucous membranes are dry.   Eyes:      Extraocular Movements: Extraocular movements intact.      Pupils: Pupils are equal, round, and reactive to light.   Pulmonary:      Effort: Pulmonary effort is normal.      Breath sounds: Normal breath sounds.   Abdominal:      General: Abdomen is flat.      Palpations: Abdomen is soft.   Musculoskeletal:      Cervical back: Normal range of motion.      Comments: The patient is tremulous in all extremities.   Skin:     General: Skin is warm and dry.   Neurological:      Mental Status: He is alert. He is disoriented.          LABS / IMAGING / EKG         Labs  Recent Results         Recent Results (from the past 24 hour(s))   ECG 12 lead     Collection Time: 06/17/21  4:12 AM   Result Value Ref Range     Ventricular rate 67       Atrial rate 67       IN Interval 136       QRS duration 82       QT Interval 426       QTC Calculation(Bazett) 450       P Axis 31       R Axis 27       T Wave Axis 4     CBC and differential     Collection Time: 06/17/21  4:15 AM   Result Value Ref Range     WBC 7.65 3.80 - 10.50 K/uL     RBC 5.70 4.50 - 5.80 M/uL     Hemoglobin 15.0 13.7 - 17.5 g/dL     Hematocrit 44.3 40.1 - 51.0 %     MCV 77.7 (L) 83.0 - 98.0 fL     MCH 26.3 (L) 28.0 - 33.2 pg     MCHC 33.9 32.2 - 36.5 g/dL     RDW 14.6 (H) 11.6 - 14.4 %     Platelets 260 150 - 350 K/uL     MPV 11.0 9.4 - 12.4 fL     Differential Type Auto       nRBC 0.0 <=0.0 %     Immature  Granulocytes 0.1 %     Neutrophils 72.7 %     Lymphocytes 15.6 %     Monocytes 9.9 %     Eosinophils 1.3 %     Basophils 0.4 %     Immature Granulocytes, Absolute 0.01 0.00 - 0.08 K/uL     Neutrophils, Absolute 5.56 1.70 - 7.00 K/uL     Lymphocytes, Absolute 1.19 (L) 1.20 - 3.50 K/uL     Monocytes, Absolute 0.76 0.30 - 1.00 K/uL     Eosinophils, Absolute 0.10 0.04 - 0.54 K/uL     Basophils, Absolute 0.03 0.01 - 0.10 K/uL   ER toxicology screen, serum     Collection Time: 06/17/21  4:15 AM   Result Value Ref Range     Salicylate <4.0 <=30.0 mg/dL     Acetaminophen <10.0 (L) 10.0 - 30.0 ug/mL     Ethanol <5 <=10 mg/dL   Magnesium     Collection Time: 06/17/21  4:15 AM   Result Value Ref Range     Magnesium 2.0 1.8 - 2.5 mg/dL   Comprehensive metabolic panel     Collection Time: 06/17/21  4:15 AM   Result Value Ref Range     Sodium 141 136 - 144 mEQ/L     Potassium 3.9 3.6 - 5.1 mEQ/L     Chloride 103 98 - 109 mEQ/L     CO2 28 22 - 32 mEQ/L     BUN 8 8 - 20 mg/dL     Creatinine 1.1 0.8 - 1.3 mg/dL     Glucose 96 70 - 99 mg/dL     Calcium 9.4 8.9 - 10.3 mg/dL     AST (SGOT) 75 (H) 15 - 41 IU/L     ALT (SGPT) 57 16 - 63 IU/L     Alkaline Phosphatase 56 35 - 126 IU/L     Total Protein 7.1 6.0 - 8.2 g/dL     Albumin 3.9 3.4 - 5.0 g/dL     Bilirubin, Total 0.7 0.3 - 1.2 mg/dL     eGFR >60.0 >=60.0 mL/min/1.73m*2     Anion Gap 10 3 - 15 mEQ/L   CK, Total     Collection Time: 06/17/21  4:15 AM   Result Value Ref Range     Total CK 1,839 (H) 16 - 300 U/L         I have personally reviewed the patient's labs as listed above     No results found for: COVID19     Imaging  No imaging studies available to review at the time of my evaluation        ECG/Telemetry  There is no EKG for me to review at the time of my evaluation     ASSESSMENT AND PLAN            * Altered mental status  Assessment & Plan  Unclear if secondary to acute intoxication/substance abuse vs acute psychiatric illness versus a combination of this  Crisis and  psychiatry have been consulted  Will likely require inpatient psychiatric placement once medically cleared  Barriers to medical clearance at this time: Elevated CPK     Elevated CPK  Assessment & Plan  Likely related to substance abuse  Aggressive IV fluids  Trend        Homicidal ideation  Assessment & Plan  The patient made vague threats about killing anyone or anything during our evaluation  He did not name any specific person nor was he able to articulate a plan  Continue one-to-one  I discussed my concerns with Dr. Cook     Suicidal ideation  Assessment & Plan  Reported SI to ED providers  1:1 ordered  Crisis and psychiatry consulted     Elevated AST (SGOT)  Assessment & Plan  Likely related to elevated CPK vs possible EtOH use  Check EtOH levels  CIWA  Trend LFTs     Substance abuse (CMS/HCC)  Assessment & Plan  Unclear history of substance abuse  Patient intimates he dipped some substance and smoked it but unable to tell us what  UDS ordered and pending  Crisis and psychiatry consulted         VTE Assessment: Padua    VTE Prophylaxis: Current anticoagulants:     •None        Code Status: Full Code      Estimated Discharge Date:   Disposition Planning: Pending clearance of elevated CPK to < 800 and psychiatric evaluation      Jacob Alvarez MD  6/17/2021                               Revision History                          Routing History                divalproex (DEPAKOTE) 24 hr ER tablet 1,000 mg 1,000 mg, oral, Nightly Given, 1,000 mg at 06/17 2254   folic acid (FOLVITE) tablet 1 mg (Followed by Linked Group #1) 1 mg, oral, Daily Ordered   folic acid 1 mg in sodium chloride 0.9 % 50 mL IVPB (Followed by Linked Group #1) 1 mg, IV, Daily New Bag, 1 mg at 06/18 0820   haloperidoL (HALDOL) tablet 5 mg 5 mg, oral, BID Given, 5 mg at 06/17 2254   thiamine (B-1) 400 mg in sodium chloride 0.9 % 100 mL IVPB (Followed by Linked Group #2) 400 mg, IV, q8h BROOKE Stopped, 06/18 0616   thiamine (VITAMIN B1) tablet  100 mg (Followed by Linked Group #2) 100 mg, oral, Daily      Last result from the past 72 hours)  [place]   Sodium  140   0816  Potassium  4.1   0816  Chloride  108   0816  CO2  26   0816  BUN  5   0816  Creatinine  0.9   0816  Hemoglobin  15.0   0415  Hematocrit  44.3   0415  WBC  7.65   0415  Platelets  260   0415  Protime  --     APTT  --     INR    Patient Information    Patient Name   Sanchez Wood ([id number]) Legal Sex   Male    1980   Results  CK, Total [[id number]] (Order [id number])  Contains abnormal data CK, Total  Order: [id number]  Status:  Final result   Visible to patient:  No (not released)   Next appt with me:  None   0 Result Notes    Ref Range & Units 21 0816    Total CK 16 - 300 U/L 530High           Specimen Collected: 21 08:16           ECG 12 lead  Order: [id number]  Status:  Final result   Visible to patient:  No (not released)   Next appt with me:  None   Dx:  Non-traumatic rhabdomyolysis   0 Result Notes  Component 21 0412   Ventricular rate 67    Atrial rate 67    AK Interval 136    QRS duration 82    QT Interval 426    QTC Calculation(Bazett) 450    P Axis 31    R Axis 27    T Wave Axis 4       Narrative    Normal sinus rhythm   Normal ECG   When compared with ECG of 01-DEC-2012 06:02,   Nonspecific ST abnormality is no longer Present   Confirmed by Martin UPTON, Jasiel Christianson ([id number]) on 2021 9:13:07 PM

## 2021-06-18 NOTE — PLAN OF CARE
Plan of Care Review  Plan of Care Reviewed With: patient  Progress: improving  Outcome Summary: cpk leveling getting better, 1: sitter for suicidal precautions

## 2021-06-18 NOTE — BEHAVIORAL HEALTH CRISIS PROGRESS NOTE
[facility] received case from previous shift for pt who requires inpatient acute on a 201. [facility] spoke with Geraldine at [facility] who has accepted pt for admission. The accepting physician will be Dr. Farshad Lora. [facility] scheduled transport with Raciel at [facility] with ETA 90 mins. [facility] made GABE Triplett aware.

## 2021-06-18 NOTE — PROGRESS NOTES
"   Hospital Medicine Service -  Daily Progress Note       SUBJECTIVE   Interval History: Awake this morning, has no complaints but has his head covered with the sheets. Not disagreeable or combative. No issues overnight. Refused labs initially. Upon asking him why we can't he stated that \"why don't you take it all out and do an autopsy\". He then asked about some made up substance in his blood. He is agreeable to having his blood drawn. Oddly, he is unclear about certain aspects of his care but very clear about the 201 having been signed.     CK fell to 926 overnight. As long as CK < 900 this AM, he is cleared medically to go to inpatient psych.      OBJECTIVE      Vital signs in last 24 hours:  Temp:  [36.1 °C (96.9 °F)-36.7 °C (98 °F)] 36.7 °C (98 °F)  Heart Rate:  [56-80] 63  Resp:  [16-18] 18  BP: (115-120)/(60-71) 115/71  No intake or output data in the 24 hours ending 06/18/21 0804    PHYSICAL EXAMINATION      Physical Exam  Constitutional:       Appearance: Normal appearance.   Neck:      Comments: Scar on the right anterior neck  Cardiovascular:      Rate and Rhythm: Normal rate and regular rhythm.      Pulses: Normal pulses.      Heart sounds: Normal heart sounds.   Pulmonary:      Effort: Pulmonary effort is normal. No respiratory distress.      Breath sounds: Normal breath sounds. No stridor. No wheezing or rhonchi.   Abdominal:      General: Abdomen is flat. Bowel sounds are normal. There is no distension.      Palpations: Abdomen is soft. There is no mass.      Tenderness: There is no abdominal tenderness.   Musculoskeletal:         General: No swelling or tenderness.   Skin:     General: Skin is warm and dry.      Capillary Refill: Capillary refill takes less than 2 seconds.   Neurological:      General: No focal deficit present.      Mental Status: He is alert.   Psychiatric:         Mood and Affect: Affect normal.         Behavior: Behavior is not agitated. Behavior is cooperative.         Thought " Content: Thought content is delusional.        LINES, CATHETERS, DRAINS, AIRWAYS, AND WOUNDS   Lines, Drains, and Airways:  Wounds (agree with documentation and present on admission):  Peripheral IV 06/17/21 Right Antecubital (Active)   Number of days: 1         Comments:      LABS / IMAGING / TELE      Labs  Labs are pending.    SARS-CoV-2 (COVID-19) (no units)   Date/Time Value   06/17/2021 0530 Negative       Imaging  I have independently reviewed the pertinent imaging from the last 24 hrs.    ECG/Telemetry  I have independently reviewed the telemetry. No events for the last 24 hours.    ASSESSMENT AND PLAN      Delusion (CMS/HCC)  Assessment & Plan  -unclear underlying psychiatric issue but has interesting delusions & states odd things to me about why we don't take all the blood and do an autopsy  -started haloperidol last night 5mg BID & valproic acid for mood stabilization.   -will clear for inpatient psych today.     Homicidal ideation  Assessment & Plan  No homicidal ideations to me or team today    Suicidal ideation  Assessment & Plan  Reported SI to ED providers  1:1 ordered  Crisis and psychiatry consulted    Elevated AST (SGOT)  Assessment & Plan  Due to rhabdo as above  Trend LFTs    Polysubstance abuse (CMS/HCC)  Assessment & Plan  -psychiatry seeing him today  -UDS positive for PCP & THC.   - plan for inpatient psych.     Non-traumatic rhabdomyolysis  Assessment & Plan  Likely related to substance abuse  Aggressive IV fluids, 200cc/hr for now  Trend to get CK < 800 then can go to inpatient psych.     * Drug intoxication with delirium (CMS/HCC)  Assessment & Plan  Intoxicated on pcp at the time of arrival and admission  Will hopefully wain with withdrawal of pcp  -monitor         VTE Assessment: Padua    VTE Prophylaxis:  Current anticoagulants:    •None      Code Status: Full Code      Estimated Discharge Date: 6/18/2021   Disposition Planning: inpatient psych     Daniel Howard DO  6/18/2021

## 2021-06-18 NOTE — DISCHARGE SUMMARY
Hospital Medicine Service -  Inpatient Discharge Summary        BRIEF OVERVIEW   Admitting Provider: Jacob Alvarez MD  Attending Provider: Daniel Howard DO Attending phys phone: [phone]    PCP: Rayne Drake [phone]    Admission Date: 6/17/2021  Discharge Date: 6/18/2021     DISCHARGE DIAGNOSES      Primary Discharge Diagnosis  Drug intoxication with delirium (CMS/HCC)    Secondary Discharge Diagnoses  Active Hospital Problems    Diagnosis Date Noted   • Delusion (CMS/[facility]) 06/18/2021     Priority: High   • Non-traumatic rhabdomyolysis 06/17/2021   • Polysubstance abuse (CMS/[facility]) 06/17/2021   • Tobacco abuse 06/17/2021      Resolved Hospital Problems    Diagnosis Date Noted Date Resolved   • Elevated AST (SGOT) 06/17/2021 06/18/2021   • Suicidal ideation 06/17/2021 06/18/2021   • Homicidal ideation 06/17/2021 06/18/2021   • Drug intoxication with delirium (CMS/HCC) 06/17/2021 06/18/2021     SUMMARY OF HOSPITALIZATION      Presenting Problem/History of Present Illness  Suicidal ideation [R45.851]  Substance abuse (CMS/HCC) [F19.10]  Elevated CPK [R74.8]  Non-traumatic rhabdomyolysis [M62.82]    This is a 41 y.o. year-old male admitted on 6/17/2021 with Suicidal ideation [R45.851]  Substance abuse (CMS/HCC) [F19.10]  Elevated CPK [R74.8]  Non-traumatic rhabdomyolysis [M62.82].    Hospital Course  40 y/o w/ unknown pmhx presented to [facility] after being found with altered mental status by EMS. He reported that he had dipped his cigarettes in an unknown liquid and smoked these. He was found in the ED to be exceptionally agitated requiring chemical restraint with ziprasidone. UDS was positive for PCP & THC and negative for methamphetamines, opiates, and cocaine. Methadone & buprenorphine were not checked. He was admitted to the medical service for management of non-traumatic rhabdomyolysis. He was treated with IV fluids and CK trended down to 491. He was seen by psychiatry and  recommended to start on depakote 1000 MG qhs & haloperidol 5mg BID. They felt that he has psychosis with delusions of grandeur & unusual speech. It is unknown what his underlying diagnosis is but he has been in and out of several different psychiatric hospitals.     #polysubstance abuse: s/p IV resuscitation  -management per inpatient psych     #delusions/psychosis: unknown what his underlying diagnosis is  -continue with haloperidol     #agitation: has not been agitated during the day but frequently declines things. Doesn't appear to have medical capacity to refuse medications/testing as he cannot reason the testing.   -continue with depakote 1000mg qhs     #home status: unclear where this patient lives and he is currently unable to tell me details about this.    Exam on Day of Discharge  Physical Exam  Constitutional:       Appearance: Normal appearance.   Cardiovascular:      Rate and Rhythm: Normal rate and regular rhythm.      Pulses: Normal pulses.      Heart sounds: Normal heart sounds. No murmur heard.   No friction rub.   Pulmonary:      Effort: Pulmonary effort is normal. No respiratory distress.      Breath sounds: Normal breath sounds. No stridor.   Abdominal:      General: Abdomen is flat. There is no distension.      Palpations: Abdomen is soft. There is no mass.      Tenderness: There is no abdominal tenderness.      Hernia: No hernia is present.   Musculoskeletal:         General: No swelling, tenderness, deformity or signs of injury. Normal range of motion.   Skin:     General: Skin is warm and dry.      Capillary Refill: Capillary refill takes less than 2 seconds.   Neurological:      General: No focal deficit present.      Mental Status: He is alert.       Consults During Admission  IP CONSULT TO SOCIAL WORK  IP CONSULT TO PSYCHIATRY  IP CONSULT TO BEHAVIORAL HEALTH CRISIS    DISCHARGE MEDICATIONS        Medication List      START taking these medications    divalproex 500 mg 24 hr  tablet  Commonly known as: DEPAKOTE  Take 2 tablets (1,000 mg total) by mouth nightly.  Dose: 1,000 mg     haloperidoL 5 mg tablet  Commonly known as: HALDOL  Take 1 tablet (5 mg total) by mouth 2 (two) times a day.  Dose: 5 mg            Instructions for after discharge     Admission H&P Valid:  Yes      Discharge Condtion:  Stabilized      Discharge Potential:  Length of Stay 31-90 Days      Discharge Summary:  Enclosed      Discharge diet      Diet Type / Texture: Regular    Free of Communicable Disease:   Yes      Patient Aware of Diagnosis: Yes      Rehab Potential:  Good      Treatment Options: Full Resuscitation      Vital Signs Per Facility Protocol               PROCEDURES / LABS / IMAGING      Operative Procedures  None    Other Procedures  none    Pertinent Labs  BMP Results       06/18/21 06/17/21 02/27/15                    0816 0415 0918          141 138         K 4.1 3.9 3.5         Cl 108 103 98         CO2 26 28 32         Glucose 81 96 89         BUN 5 8 8         Creatinine 0.9 1.1 0.8         Calcium 8.2 9.4 9.4         Anion Gap 6 10 8         EGFR >60.0 >60.0 --          SARS-CoV-2 (COVID-19) (no units)   Date/Time Value   06/17/2021 0530 Negative       Pertinent Imaging  X-RAY CHEST 1 VIEW    Result Date: 6/17/2021  IMPRESSION: No active disease in the chest. COMMENT: The current portable study the chest demonstrates somewhat limited inspiratory result. There are no consolidating infiltrates. No pulmonary vascular congestion or edema is seen. No atelectasis or pleural effusions are demonstrated. The cardiomediastinal silhouette is within normal limits of size and configuration. The bony thorax is intact.      OUTPATIENT  FOLLOW-UP / REFERRALS / PENDING TESTS        Outpatient Follow-Up Appointments  Encounter Information    This patient does not currently have any appointments scheduled.         Referrals  No orders of the defined types were placed in this encounter.      Test Results  Pending at Discharge  Unresulted Labs (From admission, onward)     Start     Ordered    06/17/21 0800  CK, Total  Every 8 hours     Question:  Release to patient  Answer:  Immediate   Start Status     06/18/21 0000 Needs to be Collected Details   06/19/21 0000 Needs to be Collected Details   06/19/21 0800 Scheduled    06/19/21 1600 Scheduled    06/20/21 0000 Scheduled    06/20/21 0800 Scheduled    06/20/21 1600 Scheduled    06/21/21 0000 Scheduled    06/21/21 0800 Scheduled    06/21/21 1600 Scheduled    06/22/21 0000 Scheduled    06/22/21 0800 Scheduled    06/22/21 1600 Scheduled    06/23/21 0000 Scheduled    06/23/21 0800 Scheduled    06/23/21 1600 Scheduled        06/17/21 0536                Important Issues to Address in Follow-Up  As per inpatient psych.     DISCHARGE DISPOSITION      Disposition: Home     Code Status At Discharge: Full Code    Physician Order for Life-Sustaining Treatment Document Status      No documents found

## 2021-06-18 NOTE — ASSESSMENT & PLAN NOTE
-unclear underlying psychiatric issue but has interesting delusions & states odd things to me about why we don't take all the blood and do an autopsy  -started haloperidol last night 5mg BID & valproic acid for mood stabilization.   -will clear for inpatient psych today.

## 2021-06-18 NOTE — DISCHARGE INSTRUCTIONS
40 y/o w/ unknown pmhx presented to [facility] after being found with altered mental status by EMS. He reported that he had dipped his cigarettes in an unknown liquid and smoked these. He was found in the ED to be exceptionally agitated requiring chemical restraint with ziprasidone. UDS was positive for PCP & THC and negative for methamphetamines, opiates, and cocaine. Methadone & buprenorphine were not checked. He was admitted to the medical service for management of non-traumatic rhabdomyolysis. He was treated with IV fluids and CK trended down to 491. He was seen by psychiatry and recommended to start on depakote 1000 MG qhs & haloperidol 5mg BID. They felt that he has psychosis with delusions of grandeur & unusual speech. It is unknown what his underlying diagnosis is but he has been in and out of several different psychiatric hospitals.    #polysubstance abuse: s/p IV resuscitation  -management per inpatient psych    #delusions/psychosis: unknown what his underlying diagnosis is  -continue with haloperidol    #agitation: has not been agitated during the day but frequently declines things. Doesn't appear to have medical capacity to refuse medications/testing as he cannot reason the testing.   -continue with depakote 1000mg qhs    #home status: unclear where this patient lives and he is currently unable to tell me details about this.

## 2021-06-18 NOTE — NURSING NOTE
Pt refused 0000 labwork. Notified Dr Mayes via secure chat. Will attempt to collect again at 0600 labwork.   Merly Deluna RN

## 2021-06-30 NOTE — ED BEHAVIORAL HEALTH ASSESSMENT NOTE - NS ED [facility] MED ROS PSYCHIATRIC
pt slammed finger in door tonight , took advil and tyelnol and went to urgent care, advised to come here, bruising evident to nail bed and pad of finger left middle finger, nil pmhx, See HPI

## 2022-05-08 NOTE — PROGRESS NOTE BEHAVIORAL HEALTH - NS ED [facility] AXIS I PRIMARY CODE FT
81
F20.0

## 2022-11-07 ENCOUNTER — HOSPITAL ENCOUNTER (EMERGENCY)
Facility: HOSPITAL | Age: 42
End: 2022-11-07
Attending: EMERGENCY MEDICINE
Payer: COMMERCIAL

## 2022-11-07 VITALS
HEART RATE: 70 BPM | TEMPERATURE: 98.4 F | RESPIRATION RATE: 18 BRPM | SYSTOLIC BLOOD PRESSURE: 117 MMHG | OXYGEN SATURATION: 100 % | DIASTOLIC BLOOD PRESSURE: 84 MMHG

## 2022-11-07 DIAGNOSIS — Z13.9 ENCOUNTER FOR MEDICAL SCREENING EXAMINATION: Primary | ICD-10-CM

## 2022-11-07 DIAGNOSIS — Z00.8 MEDICAL CLEARANCE FOR PSYCHIATRIC ADMISSION: ICD-10-CM

## 2022-11-07 PROBLEM — F29 PSYCHOTIC DISORDER (CMS/HCC): Status: ACTIVE | Noted: 2022-11-07

## 2022-11-07 LAB
AMPHET UR QL SCN: NOT DETECTED
ANION GAP SERPL CALC-SCNC: 8 MEQ/L (ref 3–15)
ATRIAL RATE: 68
BACTERIA URNS QL MICRO: ABNORMAL /HPF
BARBITURATES UR QL SCN: NOT DETECTED
BASOPHILS # BLD: 0.03 K/UL (ref 0.01–0.1)
BASOPHILS NFR BLD: 0.4 %
BENZODIAZ UR QL SCN: NOT DETECTED
BILIRUB UR QL STRIP.AUTO: NEGATIVE MG/DL
BUN SERPL-MCNC: 13 MG/DL (ref 8–20)
CALCIUM SERPL-MCNC: 9 MG/DL (ref 8.9–10.3)
CANNABINOIDS UR QL SCN: POSITIVE
CHLORIDE SERPL-SCNC: 104 MEQ/L (ref 98–109)
CLARITY UR REFRACT.AUTO: CLEAR
CO2 SERPL-SCNC: 25 MEQ/L (ref 22–32)
COCAINE UR QL SCN: NOT DETECTED
COLOR UR AUTO: YELLOW
CREAT SERPL-MCNC: 0.9 MG/DL (ref 0.8–1.3)
DIFFERENTIAL METHOD BLD: ABNORMAL
EOSINOPHIL # BLD: 0.1 K/UL (ref 0.04–0.54)
EOSINOPHIL NFR BLD: 1.3 %
ERYTHROCYTE [DISTWIDTH] IN BLOOD BY AUTOMATED COUNT: 15 % (ref 11.6–14.4)
ETHANOL SERPL-MCNC: <5 MG/DL
GFR SERPL CREATININE-BSD FRML MDRD: >60 ML/MIN/1.73M*2
GLUCOSE SERPL-MCNC: 80 MG/DL (ref 70–99)
GLUCOSE UR STRIP.AUTO-MCNC: NEGATIVE MG/DL
HCT VFR BLDCO AUTO: 40.2 % (ref 40.1–51)
HGB BLD-MCNC: 13.9 G/DL (ref 13.7–17.5)
HGB UR QL STRIP.AUTO: NEGATIVE
HYALINE CASTS #/AREA URNS LPF: ABNORMAL /LPF
IMM GRANULOCYTES # BLD AUTO: 0.02 K/UL (ref 0–0.08)
IMM GRANULOCYTES NFR BLD AUTO: 0.3 %
KETONES UR STRIP.AUTO-MCNC: NEGATIVE MG/DL
LEUKOCYTE ESTERASE UR QL STRIP.AUTO: NEGATIVE
LYMPHOCYTES # BLD: 1.79 K/UL (ref 1.2–3.5)
LYMPHOCYTES NFR BLD: 23.2 %
MCH RBC QN AUTO: 26.6 PG (ref 28–33.2)
MCHC RBC AUTO-ENTMCNC: 34.6 G/DL (ref 32.2–36.5)
MCV RBC AUTO: 76.9 FL (ref 83–98)
MONOCYTES # BLD: 0.67 K/UL (ref 0.3–1)
MONOCYTES NFR BLD: 8.7 %
MUCOUS THREADS URNS QL MICRO: ABNORMAL /LPF
NEUTROPHILS # BLD: 5.12 K/UL (ref 1.7–7)
NEUTS SEG NFR BLD: 66.1 %
NITRITE UR QL STRIP.AUTO: NEGATIVE
NRBC BLD-RTO: 0 %
OPIATES UR QL SCN: NOT DETECTED
P AXIS: 63
PCP UR QL SCN: POSITIVE
PDW BLD AUTO: 11.1 FL (ref 9.4–12.4)
PH UR STRIP.AUTO: 6 [PH]
PLATELET # BLD AUTO: 222 K/UL (ref 150–350)
POTASSIUM SERPL-SCNC: 4 MEQ/L (ref 3.6–5.1)
PR INTERVAL: 138
PROT UR QL STRIP.AUTO: ABNORMAL
QRS DURATION: 84
QT INTERVAL: 414
QTC CALCULATION(BAZETT): 440
R AXIS: 41
RBC # BLD AUTO: 5.23 M/UL (ref 4.5–5.8)
RBC #/AREA URNS HPF: ABNORMAL /HPF
SARS-COV-2 RNA RESP QL NAA+PROBE: NEGATIVE
SODIUM SERPL-SCNC: 137 MEQ/L (ref 136–144)
SP GR UR REFRACT.AUTO: 1.03
SQUAMOUS URNS QL MICRO: ABNORMAL /HPF
T WAVE AXIS: 19
UROBILINOGEN UR STRIP-ACNC: 0.2 EU/DL
VENTRICULAR RATE: 68
WBC # BLD AUTO: 7.73 K/UL (ref 3.8–10.5)
WBC #/AREA URNS HPF: ABNORMAL /HPF

## 2022-11-07 PROCEDURE — 85025 COMPLETE CBC W/AUTO DIFF WBC: CPT | Performed by: EMERGENCY MEDICINE

## 2022-11-07 PROCEDURE — 63700000 HC SELF-ADMINISTRABLE DRUG: Performed by: REGISTERED NURSE

## 2022-11-07 PROCEDURE — 93005 ELECTROCARDIOGRAM TRACING: CPT

## 2022-11-07 PROCEDURE — 93005 ELECTROCARDIOGRAM TRACING: CPT | Performed by: EMERGENCY MEDICINE

## 2022-11-07 PROCEDURE — 81001 URINALYSIS AUTO W/SCOPE: CPT | Mod: 59 | Performed by: EMERGENCY MEDICINE

## 2022-11-07 PROCEDURE — 36415 COLL VENOUS BLD VENIPUNCTURE: CPT | Performed by: EMERGENCY MEDICINE

## 2022-11-07 PROCEDURE — 90792 PSYCH DIAG EVAL W/MED SRVCS: CPT | Mod: 95 | Performed by: REGISTERED NURSE

## 2022-11-07 PROCEDURE — U0003 INFECTIOUS AGENT DETECTION BY NUCLEIC ACID (DNA OR RNA); SEVERE ACUTE RESPIRATORY SYNDROME CORONAVIRUS 2 (SARS-COV-2) (CORONAVIRUS DISEASE [COVID-19]), AMPLIFIED PROBE TECHNIQUE, MAKING USE OF HIGH THROUGHPUT TECHNOLOGIES AS DESCRIBED BY CMS-2020-01-R: HCPCS | Performed by: EMERGENCY MEDICINE

## 2022-11-07 PROCEDURE — G0480 DRUG TEST DEF 1-7 CLASSES: HCPCS | Performed by: EMERGENCY MEDICINE

## 2022-11-07 PROCEDURE — 80307 DRUG TEST PRSMV CHEM ANLYZR: CPT | Performed by: EMERGENCY MEDICINE

## 2022-11-07 PROCEDURE — 99285 EMERGENCY DEPT VISIT HI MDM: CPT | Mod: 25

## 2022-11-07 PROCEDURE — 93010 ELECTROCARDIOGRAM REPORT: CPT | Performed by: INTERNAL MEDICINE

## 2022-11-07 PROCEDURE — 82310 ASSAY OF CALCIUM: CPT | Performed by: EMERGENCY MEDICINE

## 2022-11-07 RX ORDER — LORAZEPAM 1 MG/1
1 TABLET ORAL EVERY 6 HOURS PRN
Status: DISCONTINUED | OUTPATIENT
Start: 2022-11-07 | End: 2022-11-07 | Stop reason: HOSPADM

## 2022-11-07 RX ADMIN — LORAZEPAM 1 MG: 1 TABLET ORAL at 17:11

## 2022-11-07 ASSESSMENT — COGNITIVE AND FUNCTIONAL STATUS - GENERAL
PSYCHOMOTOR FUNCTIONING: FATIGUED
AFFECT: FLAT
THOUGHT_PROCESS: POVERTY OF CONTENT
JUDGEMENT: IMPAIRED, SEVERELY
EYE_CONTACT: NO EYE OPENING
INSIGHT: IMPAIRED SEVERELY
AROUSAL LEVEL: SLEEPY
MOOD: UNABLE TO ASSESS
CONCENTRATION: IMPAIRED, SEVERE
SPEECH: INHIBITED/SPARSE
APPETITE: NO CHANGE
REMOTE MEMORY: UNABLE TO ASSESS
ATTENTION: IMPAIRED, SEVERE
PERCEPTUAL FUNCTION: AUDITORY HALLUCINATIONS
ORIENTATION: ORIENTED TO PERSON
LIBIDO: NON-CONTRIBUTORY
THOUGHT_CONTENT: GUARDED
DELUSIONS: LONG STANDING
SLEEP_WAKE_CYCLE: DECREASED
APPEARANCE: DISHEVELED
RECENT MEMORY: UNABLE TO ASSESS

## 2022-11-07 NOTE — CONSULTS
"Patient Information     Patient Name  Sanchez Wood MRN  [id number] Legal Sex  Male  Age  1989 (33 y.o.) [facility]         Admit Date Department Dept Phone    2022 [facility] Emergency Department [phone]       Presenting Problems -      Row Name 1109       Presenting Problems    Who accompanied patient today? Pt presents to ED alone via ambulance.    Presenting Problems Pt presents to the ED for a psych eval, stating he \"didn't feel right in the head\" per triage. Pt did not open his eyes during the assessment but was able to provide his name and . Pt reports he is here for \"thought distortions for 7 years.\" Pt reports he wants to go to [place]. Pt reports he has beeb IP 4 times, the most recent was sometime this year. Pt reports he is dx w/ \"schiz - something\" and wasn't sure if it was schizophrenia, schizoaffective d/o, or something else. Pt denies being on meds. Pt reports he had OP therapy and psych but doesn't have it anymore. When asked about SI, pt reported he currently has thoughts but no plan. Pt reports he previously attempted suicide via \"riding a tricycyle.\" Pt endorses he has HI and when asked about who, he stated, \"someone who's trying to be me.\" When asked about SIB, pt denies current but reported previously burning himself w/ hot coals from the supermarket. When asked about AH/VH, pt endorsed that he hears voices off and on and they tell him to \"hide my shoes.\" When asked where pt lives, he didn't answer. [facility] asked if pt is homeless; he responded that he didn't know what that was. [facility] asked pt if he lives in a house or apartment; he reported he lives in a house by himself and then added that others live w/ him. Pt endorses being self-employed and that he \"does a little bit of everything.\" When asked about D&A use, he reported he drinks alcohol, 2.5%, 5 hours ago. Pt was quick to deny access to firearms, being , and having " "children. When asked about sleep, pt reported he has difficulty falling asleep. When asked about appetite, pt responded, \"eating.\" Pt reports he has 3 insurances. When asked more about suicide, pt did not provide details about his thoughts. Pt provided limited information throughout the assessment.    Patient Experiencing sleep disturbances    Sleep Disturbances Comments Pt endorses difficulty falling asleep.    Stressors Pt did not identify any stressors.             Mental Status Exam - Mon November 07, 2022     Row Name 1141       Mental Status Exam    Arousal Level Sleepy    Appearance Disheveled    Speech Inhibited/Sparse    Psychomotor Functioning Fatigued    Eye Contact No eye opening    Orientation Oriented to person    Attention Impaired, Severe    Concentration Impaired, Severe    Recent Memory Unable to assess    Remote Memory Unable to Assess    Thought Content Guarded    Thought Process Poverty of Content    Insight Impaired severely    Judgement impaired, severely    Impulse Control --  Unable to assess    Perceptual Function Auditory hallucinations    Delusions Long Standing    Sleeping Decreased    Appetite No Change    Libido Non-Contributory    Affect Flat    Mood Unable to Assess             Suicide and Homicide Risk - Mon November 07, 2022     Row Name 1143       [facility] Suicide and Homicide Risk    Do you currently have any suicidal ideation or thoughts? Yes    Do you currently or have you had any thoughts of self-harm? No    Do you currently have homicidal ideation or have you ever harmed anyone else?  Yes    Details Wants to kill \"someone who's trying to be me.\"    Do you have easy access to firearms? No             Safe-T Assessment - Mon November 07, 2022     Row Name 1144       SAFE-T Assessment Risk Factors      Suicidal Behavior History of prior suicide attempts    Current/Past Psychiatric Disorders Psychotic disorders    Key symptoms Command hallucinations    " "Precipitants/Stressors/Interpersonal/Triggers Intoxication    Change in Treatment Non-compliant or not receiving treatment    Access to fire arms. No       SAFE-T Assessment Suicidal Inquiry    In the last month, are there things - anyone or anything (i.e. family, [place], pain of death) - that stopped you from wanting to die or acting on thoughts of suicide?  Does not apply    In the last month, what sorts of reasons did you have for thinking about wanting to die or killing yourself? Does not apply.       SAFE-T Assessment Determination of Risk and Interventions    Safe T Assessment of Risk:  Low Suicide Risk    Additional Information (if applicable): Pt provided limited information when asked these quesitons.            Alcohol Use     Yes.    Comments: Pt reported he drank a \"2.5% 5 hours ago.\"      Tobacco Use     Never assessed smoking status.    Smokeless Tobacco: Unknown status of smokeless tobacco use.      Problem List  Current as of 11/07/22 1152           No problems recorded      Allergies    Not on File     Results (last 24 hours)     Procedure Component Value Units Date/Time    Drug screen panel, urine [[id number]] Collected: 11/07/22 1152    Order Status: Sent Specimen: Urine, Clean Catch     Urinalysis with Reflex Culture [[id number]] Collected: 11/07/22 1152    Order Status: Sent Specimen: Urine, Clean Catch     Narrative:      The following orders were created for panel order Urinalysis with Reflex Culture.  Procedure                               Abnormality         Status                     ---------                               -----------         ------                     UA Reflex to Culture (Ma...[[id number]]                                                   Please view results for these tests on the individual orders.    UA Reflex to Culture (Macroscopic) [[id number]] Collected: 11/07/22 1152    Order Status: Sent Specimen: Urine, Clean Catch     Basic metabolic panel [[id number]]  (Normal) " Collected: 11/07/22 0639    Order Status: Completed Specimen: Blood, Venous Updated: 11/07/22 0716     Sodium 137 mEQ/L      Potassium 4.0 mEQ/L      Chloride 104 mEQ/L      CO2 25 mEQ/L      BUN 13 mg/dL      Creatinine 0.9 mg/dL      Glucose 80 mg/dL      Calcium 9.0 mg/dL      eGFR >60.0 mL/min/1.73m*2      Anion Gap 8 mEQ/L     Ethanol serum [[id number]]  (Normal) Collected: 11/07/22 0639    Order Status: Completed Specimen: Blood, Venous Updated: 11/07/22 0716     Ethanol <5 mg/dL     CBC and differential [[id number]]  (Abnormal) Collected: 11/07/22 0639    Order Status: Completed Specimen: Blood, Venous Updated: 11/07/22 0659     WBC 7.73 K/uL      RBC 5.23 M/uL      Hemoglobin 13.9 g/dL      Hematocrit 40.2 %      MCV 76.9 fL      MCH 26.6 pg      MCHC 34.6 g/dL      RDW 15.0 %      Platelets 222 K/uL      MPV 11.1 fL      Differential Type Auto     nRBC 0.0 %      Immature Granulocytes 0.3 %      Neutrophils 66.1 %      Lymphocytes 23.2 %      Monocytes 8.7 %      Eosinophils 1.3 %      Basophils 0.4 %      Immature Granulocytes, Absolute 0.02 K/uL      Neutrophils, Absolute 5.12 K/uL      Lymphocytes, Absolute 1.79 K/uL      Monocytes, Absolute 0.67 K/uL      Eosinophils, Absolute 0.10 K/uL      Basophils, Absolute 0.03 K/uL       Medical History          No past medical history on file.      Surgical History          No past surgical history on file.       Mental Health/Substance Use Treatment - Mon November 07, 2022     Row Name 1146       Previous Mental Health Treatment    Previous Mental Health Treatment inpatient treatment    IP Treatment Provider/Reason Pt reports he was IP 4 times, did not provide details       Current Mental Health Treatment    Current Mental Health Treatment none       Previous Substance Use Treatment    Previous Substance Use Treatment none       Current Substance Use Treatment    Current Substance Use Treatment none             Living Environment - Mon November 07, 2022     Row  "Name 1147       Living Environment    People in Home other (see comments)    Unique Family Situation Pt reports he lives with myself and others\" but did not provide details.    Current Living Arrangements apartment    Primary Care Provided by self    Provides Primary Care For no one    Family Caregiver if Needed none    Living Arrangement Comments Pt was evasive about his living situation.            Employment History     Occupation Employer Comments    Self-employed  \"I do a little bit of everything.\"      Family and Education     Marital Status Number of Children    Single 0      Social Identity     Preferred Language Ethnicity Race    English Not , /a, or English origin Black or           Diagnosis Codes - Mon November 07, 2022     Row Name 1149       Diagnosis    Primary Code 1 F29    Primary Code Description 1 Unspecified psychotic disorder             Recommendations/Plan - Mon November 07, 2022     Row Name 1149       Recommendations/Plan    Clinical assessment summary Pt was evasive w/ his answers and the information he was willing/able to provide. Pt endorsed SI and HI but did not share a plan or specific person. Pt is possibly psychotic/delusional. [facility] requesting psych consult for further assessment, determination of level of care, and capacity check.    Recommended level of care Other            Radiology Results (last 24 hours)    No matching results found        ECG Results (last 24 hours)      ECG 12 lead [[id number]]  Resulted: 11/07/22 0649, Result status: Preliminary result   Ordering provider: James Montes MD  11/07/22 0624 Resulting lab: [facility]   Narrative:  Normal sinus rhythm  Normal ECG  No previous ECGs available    Components    Component Value Flag   Ventricular rate 68  --   Atrial rate 68  --   IL Interval 138  --   QRS duration 84  --   QT Interval 414  --   QTC Calculation(Bazett) 440  --   P Axis 63  --   R Axis 41  --   T Wave Axis 19  --            ECG " 12 lead [[id number]]  Resulted: 11/07/22 0637, Result status: Preliminary result   Ordering provider: James Montes MD  11/07/22 0624 Resulting lab: [facility]USE   Narrative:  Normal sinus rhythm  Normal ECG  No previous ECGs available    Components    Component Value Flag   Ventricular rate 68  --   Atrial rate 68  --   IA Interval 138  --   QRS duration 84  --   QT Interval 414  --   QTC Calculation(Bazett) 440  --   P Axis 63  --   R Axis 41  --   T Wave Axis 19  --                  Microbiology Results     Procedure Component Value Units Date/Time    SARS-CoV-2 (COVID-19), PCR Nasopharynx [[id number]]  (Normal) Collected: 11/07/22 0639    Specimen: Nasopharyngeal Swab from Nasopharynx Updated: 11/07/22 0839    Narrative:      The following orders were created for panel order SARS-CoV-2 (COVID-19), PCR Nasopharynx.  Procedure                               Abnormality         Status                     ---------                               -----------         ------                     SARS-CoV-2 (COVID-19), P...[[id number]]  Normal              Final result                 Please view results for these tests on the individual orders.    SARS-CoV-2 (COVID-19), PCR Nasopharynx [[id number]]  (Normal) Collected: 11/07/22 0639    Specimen: Nasopharyngeal Swab from Nasopharynx Updated: 11/07/22 0839     SARS-CoV-2 (COVID-19) Negative    Narrative:      Testing performed using real-time PCR for detection of COVID-19. EUA approved validation studies performed on site.       Home Medications     No medications on file.

## 2022-11-07 NOTE — PROGRESS NOTES
PCP    UNABLE, TO OBTAIN PCP  Patient Information    Patient Name   Sanchez Wood DEBORAH    Address   [address] [place] [address]    Race   Black or                     Patient Legal Name   Sanchez Wood    Legal Sex   Male    Date of Birth   05/23/1989                    Room   RP    Ethnic Group   Not , /a, or Malay origin    Language   English                    MRN   [id number]    Phone Numbers   Hm: [phone]    PCP   Unable, To Obtain Pcp                      Patient Contacts    None on File  Documents Filed to Patient    Power of  Living Will Clinical Unknown Study Attachment Consent Form ABN Waiver After Visit Summary Lab Result Scan Code Status [facility] MyChart Status Advance Care Planning    Not on File  Not on File  Not on File  Not on File  Not on File  Not on File  Not on File  Not on File  Not on file  Inactive Jump to the Activity      Auth/Cert Information    Hospital account [id number] has no auth/cert information available.     Bed Days    No auth/cert for hospital account [id number]; no bed days information is available.     Admission Information    Current Information    Attending Provider Admitting Provider Admission Type Admission Status     Emergency Confirmed Admission - ED Roomed          Admission Date/Time Discharge Date Hospital Service Auth/Cert Status   11/07/22  06:12 AM  Emergency Medicine Incomplete          Hospital Area Unit Room/Bed    [facility] ED RPJ/J              Hospital Account    Name Acct ID Class Status Primary Coverage   Sanchez Wood [id number] Emergency Open None            Guarantor Account (for Hospital Account #[id number])    Name Relation to Pt Service Area Active? Acct Type   Sanchez Wood Self ML[facility] Yes Personal/Family   Address Phone     [address] [place] [address] [phone](H)              Coverage Information (for Hospital Account #[id number])    Not  on file

## 2022-11-07 NOTE — CONSULTS
"          TELEPSYCHIATRY     This exam was done through synchronous audio-video services.  The patient consented to the telehealth exam.    The provider was located in: [place]  The patient was located in: [facility]  The following other people were present during the exam: 1:1    Psychiatry Consult    CC: This is a 42-year-old male seen on consultation for psychosis    Subjective     Sanchez Wood is a 42 y.o. male with a PMH of psychosis who presented to the emergency room after calling 911 for psychiatric help stating he \"didn't feel right in my head.\"  Psychiatry was consulted for disposition.    Patient received sitting up in bed, awake and alert.  Oriented to self, place and situation.  Unable to state the date.  Introduced self and role, patient agreeable to psychiatric interview.  Patient is poor historian, tangential and difficult to follow at times.  Patient states he called 911 \" for level of care.\"  Patient states he has been having visual hallucinations of pictures of his art creations and auditory hallucinations that are self-deprecating in nature stating things like \"you have no job\" and commanding stating \"pain.\"  Patient states he has had suicidal thoughts of running into traffic. Endorses current SI, no plan. He endorses racing thoughts and increased anxiety.  Endorses poor sleep and appetite.  Patient endorses paranoia believes people are following him in regards to COVID-19.  When asked about substance use patient states \"everything.\"  Discussed UDS (+PCP, THC), patient unable to elaborate on substance use.  Patient denied homicidal ideation, intent or plan.    Unable to review outside records..    Psychiatric History: Endorses history of \" psychosis but not necessarily schizophrenia.\"  States he has been to several inpatient psychiatric hospitals in the past including [place] \" maybe 2 times.\"  States he has been on several psychiatric medications in the past but unable to recall which ones.  " "He did bring up the Invega shot however then stated he did not receive it.  Has had outpatient psychiatry in the past, unable to recall name.  Reports history of suicide attempts though is unable to elaborate.    Suicide Risk Factors:  Previous Suicide Attempts: Yes: \"a few.\"  Current Psychiatrist: no  Past psychiatric Hospitalization: yes - several, [place]  Medication Trials:  yes - unable to recall, none per med dispense list  ECT trials: no    Substance Use History: Patient endorses polysubstance use though unable to elaborate on specific drugs or amounts.  UDS + PCP, THC. Denied rehab stay but endorsed previous dual diagnosis treatment at [facility].    Consequences of use: Yes:  Health Problems  Past D&A Treatment: Yes: duel diagnosis    Family History: No family history on file.    Social History: Patient unable to provide where he lives or collateral.  Social History     Socioeconomic History   • Marital status: Single     Spouse name: None   • Number of children: 0   • Years of education: None   • Highest education level: None   Occupational History   • Occupation: Self-employed     Comment: \"I do a little bit of everything.\"   Substance and Sexual Activity   • Alcohol use: Yes     Comment: Pt reported he drank a \"2.5% 5 hours ago.\"     Social Determinants of Health     Food Insecurity: Unknown   • Worried About Running Out of Food in the Last Year: Patient refused   • Ran Out of Food in the Last Year: Patient refused       Medical History: No past medical history on file.    Surgical History: No past surgical history on file.    Allergies: Not on File    Current Medications:      Home Medications:  Not in a hospital admission.    Review of Systems  Behavioral/Psych: positive for anxiety, illegal drug usage and sleep disturbance    Objective     Vital Signs for the last 24 hours:  Temp:  [36.2 °C (97.2 °F)-36.9 °C (98.4 °F)] 36.9 °C (98.4 °F)  Heart Rate:  [63-73] 63  Resp:  [16] 16  BP: (102-110)/(52-75) " 102/52    Labs  Sodium 137  Potassium 4.0  GFR >60  BUN 13  Creatinine 0.9  Platelets 222  UDS + PCP, THC    Imaging  Not applicable    ECG   QTC Calculation(Bazett) 440 P        MENTAL STATUS EXAM  Appearance: unkempt  Gait and Motor: no abnormal movements  Speech: normal rate/rhythm/volume  Mood: anxious, irritable edge  Affect: restricted  Associations: illogical and disorganized  Thought Process: tangential  Thought Content: auditory hallucinations, visual hallucinations and paranoia  Suicidality/Homicidality: + SI with thoughts of running into traffic  Judgement/Insight: impaired/fair  Orientation: type of place, name of place, situation and self  Memory: impaired recollection  Attention: alert    Psychotic disorder (CMS/HCC)  Assessment & Plan  Patient is a 42-year-old male with a history of substance use disorder and psychosis seen on consultation for psychosis.  Patient presented to the ER after calling 911 requesting psychiatric help and admission to [facility].  Patient presents as poor historian, tangential with illogical thought process, anxious, restricted affect, endorsing AVH with SI.  Remained calm and cooperative throughout interview, irritable edge.  Patient endorses recent polysubstance use though was unable to elaborate on use. UDS + THC, PCP.  Discussed treatment plan and recommendation with patient.  He verbalized understanding and is agreeable.    Diff Diagnosis:   Substance induced psychosis  Psychotic do NOS    Plan  Suggest team attempt to obtain contact for collateral.   Disposition: Recommend voluntary dual diagnosis hospitalization/stabilization. Admit to first available inpatient bed.  Patient currently 201 (voluntary).  If patient changes mind, PLEASE RECONSULT PSYCHIATRY/TELEPSYCHIATRY for an evaluation as the patient may then require 302 (involuntary) commitment.  Psychiatric Clearance: No. Cannot leave AMA without psychiatric clearance.  Observation level - 1:1 needed?  Yes, for  suicide precaution  Pharmacological: Will order Ativan 1mg PO q6hrs prn anxiety/agitation. Avoid antipsychotics given recent PCP use.  Additional work up/ labs: Consider checking CPK given recent PCP use.  Follow up needed while in hospital: Psychiatry will follow for continued disposition.    Spent 65 minutes in total treatment including chart review, obtaining collateral if applicable, consultation with patient and note writing

## 2022-11-07 NOTE — BEHAVIORAL HEALTH CRISIS PROGRESS NOTE
10:20am: Informed that pt is medically clear by .    Met w/ pt.     11:50am: Outreached RICHA Perales for consult. Pt's responses to assessment questions were evasive and minimal. [facility] is requesting a second assessment and opinion regarding dx and level of care.     1:35pm: Set pt up for telepsych w/ RICHA Perales.     Asked registration to run pt's info for insurance; they reported he doesn't have any active insurance. Pt reports he has CB.     2:12pm: RICHA Perales is recommending dual. Pt is agreeable.    Provided pt w/ his pt bill of rights. Obtained 201 and explanation of rights signatures. Obtained attending 201 signature. Paperwork on pt's chart.      3:34pm: Faxed clinicals to [facility].     Requested [facility] login info from team. No response.     3:51pm: Called [facility] to see if pt has CB. Left voicemail.     4pm: Received return call from Addi at [facility]. Pt's  is 1980 and he has Managed Medicare through [employer] ([id number]) and [facility] ([id number]). Provided new info to registration who were able to merge his charts.     Bed Search:    [place]: no beds  [place]: Geraldine, no beds  [facility]: Susan, no beds  [facility]: no beds  [place]: Gordon, no beds  [facility]: Nikolai, no beds  [place]: dual beds   [facility]: Erendira, beds  [place]: no male beds  [place]: Mariluz, no beds  [place]: Vijaya, beds  [facility]: Nazario, no beds  [place]: no answer and no return call from earlier voicemail  [place]: Stefanie, no beds but will review for tomorrow   [place]: Merly, no male beds  [place]: Jason, beds    4:15pm: Faxed clinicals to [facility] (also texted Jason), and [facility]. Updated tx team.      4:33pm: Cherelle from [place] called to say that while pt is not appropriate for their dual bed, he is appropriate for a general psych bed. There is 1 d/c bed available tomorrow. [facility] needs pt's COVID, UDS, 201, and insurance info.     5:02pm: Received a call from Vijaya at [facility] who reports that pt presented  from another ED on 10/26 and was denied by [facility] due to low acuity. Vijaya reports she can see that the pt is now presenting w/ SI and HI but she needs to look into his insurance. On 10/26, Carmen went to get auth and was told they would need a denial letter from Medicare (he's out of days) and a full pre-cert from [facility]. Vijaya will look into the insurance and call back as they have a dual bed available.     5:10pm: Received a call from Maria R at [facility] who reports that the pt is accepted pending 201 and [facility] pre-cert.     5:16pm: Vijaya from [facility] called to accept pt pending his 201 and pre-cert. Accepting doc is Dr. Kay Hardin MD and UR is Oren at [phone].    5:23pm: Called [facility] to inform that pt accepted bed at another facility - spoke w/ Maria R.     5:25pm: Called [facility] to inform that pt accepted bed at another facility - spoke w/ Addi.    5:27pm: Called [facility] for pre-cert - left voicemail.       6:03pm: Received call from Faiza at [facility] who reports pt has 4 Medicare days left. He was admitted about a week ago and d/c w/ days remaining. Faiza stated that she cannot do pre-admission until [facility] confirms w/ [employer] Managed Medicare.     6:06pm: Called [facility] ([phone]) for pre-cert - spoke w/ Sujey. Pt has a [place] Dual Special Needs Plan w/ ID#: [id number]. Spent 40 minutes on the phone, provided all clinicals. Sujey reported that this case will be assigned to an after-hours member who will get back within an hour. If no word by 7:45pm, call [phone] ext [phone].     Handoff provided to [facility].

## 2022-11-07 NOTE — ASSESSMENT & PLAN NOTE
Patient is a 33-year-old male with a history of substance use disorder and psychosis seen on consultation for psychosis.  Patient presented to the ER after calling 911 requesting psychiatric help and admission to [facility].  Patient presents as poor historian, tangential with illogical thought process, anxious, restricted affect, endorsing AVH with SI.  Remained calm and cooperative throughout interview, irritable edge.  Patient endorses recent polysubstance use though was unable to elaborate on use. UDS + THC, PCP.  Discussed treatment plan and recommendation with patient.  He verbalized understanding and is agreeable.    Diff Diagnosis:   Substance induced psychosis  Psychotic do NOS    Plan  Disposition: Recommend voluntary dual diagnosis hospitalization/stabilization. Admit to first available inpatient bed.  Patient currently 201 (voluntary).  If patient changes mind, PLEASE RECONSULT PSYCHIATRY/TELEPSYCHIATRY for an evaluation as the patient may then require 302 (involuntary) commitment.  Psychiatric Clearance: No. Cannot leave AMA without psychiatric clearance.  Observation level - 1:1 needed?  Yes, for suicide precaution  Pharmacological: Will order Ativan 1mg PO q6hrs prn anxiety/agitation. Avoid antipsychotics given recent PCP use.  Additional work up/ labs: Consider checking CPK given recent PCP use.  Follow up needed while in hospital: Psychiatry will follow for continued disposition.
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
## 2022-11-08 NOTE — BEHAVIORAL HEALTH CRISIS PROGRESS NOTE
[facility] received case from previous shift for pt who has been accepted to [place] for admission. [facility] spoke with Alba at [facility] who approved 4 days LCD 11/10/22 with AUTH# [id number]. [facility] scheduled transport with Saige at [facility] with ETA 9:15pm. [facility] made treatment team aware.

## 2023-08-14 VITALS
SYSTOLIC BLOOD PRESSURE: 109 MMHG | HEIGHT: 74 IN | WEIGHT: 240.08 LBS | TEMPERATURE: 98 F | RESPIRATION RATE: 18 BRPM | DIASTOLIC BLOOD PRESSURE: 70 MMHG | OXYGEN SATURATION: 99 % | HEART RATE: 91 BPM

## 2023-08-14 LAB
AMPHET UR-MCNC: NEGATIVE — SIGNIFICANT CHANGE UP
ANION GAP SERPL CALC-SCNC: 6 MMOL/L — SIGNIFICANT CHANGE UP (ref 5–17)
APAP SERPL-MCNC: <5 UG/ML — LOW (ref 10–30)
APPEARANCE UR: CLEAR — SIGNIFICANT CHANGE UP
BACTERIA # UR AUTO: SIGNIFICANT CHANGE UP /HPF
BARBITURATES UR SCN-MCNC: NEGATIVE — SIGNIFICANT CHANGE UP
BASOPHILS # BLD AUTO: 0.05 K/UL — SIGNIFICANT CHANGE UP (ref 0–0.2)
BASOPHILS NFR BLD AUTO: 0.9 % — SIGNIFICANT CHANGE UP (ref 0–2)
BENZODIAZ UR-MCNC: NEGATIVE — SIGNIFICANT CHANGE UP
BILIRUB UR-MCNC: NEGATIVE — SIGNIFICANT CHANGE UP
BUN SERPL-MCNC: 11 MG/DL — SIGNIFICANT CHANGE UP (ref 7–23)
CALCIUM SERPL-MCNC: 9.4 MG/DL — SIGNIFICANT CHANGE UP (ref 8.4–10.5)
CHLORIDE SERPL-SCNC: 101 MMOL/L — SIGNIFICANT CHANGE UP (ref 96–108)
CO2 SERPL-SCNC: 31 MMOL/L — SIGNIFICANT CHANGE UP (ref 22–31)
COCAINE METAB.OTHER UR-MCNC: POSITIVE
COLOR SPEC: YELLOW — SIGNIFICANT CHANGE UP
CREAT SERPL-MCNC: 0.89 MG/DL — SIGNIFICANT CHANGE UP (ref 0.5–1.3)
DIFF PNL FLD: NEGATIVE — SIGNIFICANT CHANGE UP
EGFR: 108 ML/MIN/1.73M2 — SIGNIFICANT CHANGE UP
EOSINOPHIL # BLD AUTO: 0.32 K/UL — SIGNIFICANT CHANGE UP (ref 0–0.5)
EOSINOPHIL NFR BLD AUTO: 5.7 % — SIGNIFICANT CHANGE UP (ref 0–6)
EPI CELLS # UR: SIGNIFICANT CHANGE UP /HPF (ref 0–5)
ETHANOL SERPL-MCNC: <10 MG/DL — SIGNIFICANT CHANGE UP (ref 0–10)
GLUCOSE SERPL-MCNC: 90 MG/DL — SIGNIFICANT CHANGE UP (ref 70–99)
GLUCOSE UR QL: NEGATIVE — SIGNIFICANT CHANGE UP
HCT VFR BLD CALC: 40 % — SIGNIFICANT CHANGE UP (ref 39–50)
HGB BLD-MCNC: 13.9 G/DL — SIGNIFICANT CHANGE UP (ref 13–17)
IMM GRANULOCYTES NFR BLD AUTO: 0.4 % — SIGNIFICANT CHANGE UP (ref 0–0.9)
KETONES UR-MCNC: NEGATIVE — SIGNIFICANT CHANGE UP
LEUKOCYTE ESTERASE UR-ACNC: ABNORMAL
LYMPHOCYTES # BLD AUTO: 1.8 K/UL — SIGNIFICANT CHANGE UP (ref 1–3.3)
LYMPHOCYTES # BLD AUTO: 31.9 % — SIGNIFICANT CHANGE UP (ref 13–44)
MCHC RBC-ENTMCNC: 27.4 PG — SIGNIFICANT CHANGE UP (ref 27–34)
MCHC RBC-ENTMCNC: 34.8 GM/DL — SIGNIFICANT CHANGE UP (ref 32–36)
MCV RBC AUTO: 78.7 FL — LOW (ref 80–100)
METHADONE UR-MCNC: NEGATIVE — SIGNIFICANT CHANGE UP
MONOCYTES # BLD AUTO: 0.57 K/UL — SIGNIFICANT CHANGE UP (ref 0–0.9)
MONOCYTES NFR BLD AUTO: 10.1 % — SIGNIFICANT CHANGE UP (ref 2–14)
NEUTROPHILS # BLD AUTO: 2.89 K/UL — SIGNIFICANT CHANGE UP (ref 1.8–7.4)
NEUTROPHILS NFR BLD AUTO: 51 % — SIGNIFICANT CHANGE UP (ref 43–77)
NITRITE UR-MCNC: NEGATIVE — SIGNIFICANT CHANGE UP
NRBC # BLD: 0 /100 WBCS — SIGNIFICANT CHANGE UP (ref 0–0)
OPIATES UR-MCNC: NEGATIVE — SIGNIFICANT CHANGE UP
PCP SPEC-MCNC: SIGNIFICANT CHANGE UP
PCP UR-MCNC: NEGATIVE — SIGNIFICANT CHANGE UP
PH UR: 6 — SIGNIFICANT CHANGE UP (ref 5–8)
PLATELET # BLD AUTO: 226 K/UL — SIGNIFICANT CHANGE UP (ref 150–400)
POTASSIUM SERPL-MCNC: 4 MMOL/L — SIGNIFICANT CHANGE UP (ref 3.5–5.3)
POTASSIUM SERPL-SCNC: 4 MMOL/L — SIGNIFICANT CHANGE UP (ref 3.5–5.3)
PROT UR-MCNC: NEGATIVE MG/DL — SIGNIFICANT CHANGE UP
RBC # BLD: 5.08 M/UL — SIGNIFICANT CHANGE UP (ref 4.2–5.8)
RBC # FLD: 15.5 % — HIGH (ref 10.3–14.5)
RBC CASTS # UR COMP ASSIST: ABNORMAL /HPF
SALICYLATES SERPL-MCNC: <0.3 MG/DL — LOW (ref 2.8–20)
SODIUM SERPL-SCNC: 138 MMOL/L — SIGNIFICANT CHANGE UP (ref 135–145)
SP GR SPEC: 1.02 — SIGNIFICANT CHANGE UP (ref 1–1.03)
THC UR QL: POSITIVE
UROBILINOGEN FLD QL: 0.2 E.U./DL — SIGNIFICANT CHANGE UP
WBC # BLD: 5.65 K/UL — SIGNIFICANT CHANGE UP (ref 3.8–10.5)
WBC # FLD AUTO: 5.65 K/UL — SIGNIFICANT CHANGE UP (ref 3.8–10.5)
WBC UR QL: ABNORMAL /HPF

## 2023-08-14 PROCEDURE — 99285 EMERGENCY DEPT VISIT HI MDM: CPT

## 2023-08-14 NOTE — ED PROVIDER NOTE - CLINICAL SUMMARY MEDICAL DECISION MAKING FREE TEXT BOX
Patient with normal vitals, no medical complaints   plan for EKG/labs/urine drug screen for medical clearance, followed by psychiatric evaluation
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
## 2023-08-14 NOTE — ED PROVIDER NOTE - EKG ADDITIONAL QUESTION - PERFORMED INDEPENDENT VISUALIZATION
CC:  Rosa Isela Dotson is here today for Vaginal Problem (Possible BV/yeast)    Symptoms onset 1 week itching onset 2 days     Medications: medications verified, no change  Added preferred pharmacy  Refills needed today?  NO    denies Latex allergy or sensitivity    Health Maintenance Due   Topic Date Due   • COVID-19 Vaccine (4 - Booster for Pfizer series) 03/18/2022   • DTaP/Tdap/Td Vaccine (3 - Td or Tdap) 01/28/2023   • Shingles Vaccine (1 of 2) Never done       Patient is due for topics as listed above but is not proceeding with Immunization(s) COVID-19 and Shingles at this time. Md to discuss      Patient would like communication of their results via:      [facility]    Home Phone: [phone] (home)  Okay to leave a message containing results? No    Cell Phone:   Telephone Information:   Mobile [phone]     Okay to leave a message containing results? Yes     Yes

## 2023-08-14 NOTE — ED PROVIDER NOTE - PHYSICAL EXAMINATION
CONST: nontoxic NAD speaking in full sentences  HEAD: atraumatic  EYES: conjunctivae clear  NECK: supple  CARD: regular rate  CHEST: breathing comfortably, no stridor/retractions/tripoding  EXT: FROM  SKIN: warm, dry  NEURO: awake alert answering questions following commands moving all extremities
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
## 2023-08-15 ENCOUNTER — INPATIENT (INPATIENT)
Facility: HOSPITAL | Age: 43
LOS: 1 days | Discharge: HOME | DRG: 885 | End: 2023-08-17
Attending: PSYCHIATRY & NEUROLOGY | Admitting: PSYCHIATRY & NEUROLOGY
Payer: MEDICARE

## 2023-08-15 DIAGNOSIS — F29 UNSPECIFIED PSYCHOSIS NOT DUE TO A SUBSTANCE OR KNOWN PHYSIOLOGICAL CONDITION: ICD-10-CM

## 2023-08-15 PROBLEM — F32.9 MAJOR DEPRESSIVE DISORDER, SINGLE EPISODE, UNSPECIFIED: Chronic | Status: ACTIVE | Noted: 2020-10-11

## 2023-08-15 LAB — SARS-COV-2 RNA SPEC QL NAA+PROBE: SIGNIFICANT CHANGE UP

## 2023-08-15 PROCEDURE — 99222 1ST HOSP IP/OBS MODERATE 55: CPT

## 2023-08-15 PROCEDURE — 90792 PSYCH DIAG EVAL W/MED SRVCS: CPT | Mod: 95

## 2023-08-15 RX ORDER — IBUPROFEN 200 MG
200 TABLET ORAL EVERY 6 HOURS
Refills: 0 | Status: DISCONTINUED | OUTPATIENT
Start: 2023-08-15 | End: 2023-08-17

## 2023-08-15 RX ORDER — HALOPERIDOL DECANOATE 100 MG/ML
5 INJECTION INTRAMUSCULAR EVERY 6 HOURS
Refills: 0 | Status: DISCONTINUED | OUTPATIENT
Start: 2023-08-15 | End: 2023-08-17

## 2023-08-15 RX ORDER — DIPHENHYDRAMINE HCL 50 MG
50 CAPSULE ORAL EVERY 6 HOURS
Refills: 0 | Status: DISCONTINUED | OUTPATIENT
Start: 2023-08-15 | End: 2023-08-17

## 2023-08-15 RX ORDER — OLANZAPINE 15 MG/1
5 TABLET, FILM COATED ORAL AT BEDTIME
Refills: 0 | Status: DISCONTINUED | OUTPATIENT
Start: 2023-08-15 | End: 2023-08-16

## 2023-08-15 NOTE — BH INPATIENT PSYCHIATRY ASSESSMENT NOTE - CURRENT MEDICATION
MEDICATIONS  (STANDING):  OLANZapine 5 milliGRAM(s) Oral at bedtime    MEDICATIONS  (PRN):  aluminum hydroxide/magnesium hydroxide/simethicone Suspension 30 milliLiter(s) Oral every 4 hours PRN Dyspepsia  diphenhydrAMINE 50 milliGRAM(s) Oral every 6 hours PRN Rash and/or Itching  haloperidol     Tablet 5 milliGRAM(s) Oral every 6 hours PRN agitation  ibuprofen  Tablet. 200 milliGRAM(s) Oral every 6 hours PRN Moderate Pain (4 - 6)  LORazepam     Tablet 2 milliGRAM(s) Oral every 8 hours PRN agitation

## 2023-08-15 NOTE — BH PATIENT PROFILE - HOME MEDICATIONS
OLANZapine 20 mg oral tablet , 1 tab(s) orally once a day (at bedtime)  risperiDONE 1 mg oral tablet, disintegrating , 1 tab(s) orally once a day
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
## 2023-08-15 NOTE — ED BEHAVIORAL HEALTH ASSESSMENT NOTE - NSBHMSEBEHAV_PSY_A_CORE


 


Mono % (Auto)  5.0 


 


Eos % (Auto)  1.2 


 


Baso % (Auto)  0.5 


 


Neut #  7.8 H 


 


Lymph #  0.7 L 


 


Mono #  Cooperative

## 2023-08-15 NOTE — ED BEHAVIORAL HEALTH ASSESSMENT NOTE - PSYCHIATRIC ISSUES AND PLAN (INCLUDE STANDING AND PRN MEDICATION)
Admit to inpatient on 9.13, defer to inpatient team for starting meds, obtain collaterals, obtain labs and EKG.

## 2023-08-15 NOTE — ED BEHAVIORAL HEALTH ASSESSMENT NOTE - NSACTIVEVENT_PSY_ALL_CORE
daughter's death/Triggering events leading to humiliation, shame, and/or despair (e.g., Loss of relationship, financial or health status) (real or anticipated)

## 2023-08-15 NOTE — BH PATIENT PROFILE - NSPROIMPLANTSMEDDEV_GEN_A_NUR
None Post-Care Instructions: I reviewed with the patient in detail post-care instructions. Patient is not to engage in any heavy lifting, exercise, or swimming for the next 14 days. Should the patient develop any fevers, chills, bleeding, severe pain patient will contact the office immediately.

## 2023-08-15 NOTE — BH TREATMENT PLAN - NSTXDEPRESINTERRN_PSY_ALL_CORE
Utilize existing coping strategies and assist in developing new strategies, such as relaxation, gratitude.
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
## 2023-08-15 NOTE — ED BEHAVIORAL HEALTH ASSESSMENT NOTE - SUMMARY
Pt is a 42 yo M with PPhx of Schizophrenia who  by self to ED requesting help for hearing voice and SI.     Pt's presentation is precipitated by stressors of his daughter suicide attempt. He reported having thoughts of dying through drinking. He attempted to seek help in [facility] but was not sucessful so came to [facility]. There might be a component of secondary gain given relevant past psych hx and his presentation, although he is at acute risk given his desperation to seek help and hoping between ED. An inpatient admission on voluntary basis might help him to restart his meds.

## 2023-08-15 NOTE — ED BEHAVIORAL HEALTH ASSESSMENT NOTE - OTHER PAST PSYCHIATRIC HISTORY (INCLUDE DETAILS REGARDING ONSET, COURSE OF ILLNESS, INPATIENT/OUTPATIENT TREATMENT)
Dx of Scz  Hx of inpatient psych admission 6 months ago  Has an outpt psychiatrist in [facility] but noc compliant with Rx

## 2023-08-15 NOTE — ED BEHAVIORAL HEALTH NOTE - BEHAVIORAL HEALTH NOTE
==================  PRE-HOSPITAL COURSE  ===================  SOURCE:  NOELLE Palomo and secondhand ED documentation  DETAILS: BIB self for SI and CAH  =========  ED COURSE  =========  SOURCE:  RN and secondhand ED documentation.  ARRIVAL:  Per chart and RN, patient arrived via walk in. Per RN, patient was calm upon arrival, and cooperative with triage process.  BELONGINGS:  Per RN, patient arrived with belongings. All belongings were provided to hospital security, and patient currently in a gown with a 1:1 staff member.  BEHAVIOR: RN described patient to be calm and cooperative, presenting with linear thought process, AAOx3, presenting with normal mood and flat affect, remains in behavioral and impulse control, is not violent/aggressive. RN stated that the patient is endorsing SI/CAH. RN stated that there are no visible marks, bruises, or lacerations on the body. RN stated that the patient appears to be well-groomed, maintains fair hygiene, ambulates without assistance.  TREATMENT:  Per chart and RN, patient did not receive PRN medications.   VISITORS:  Per RN, no visitors at bedside.         COVID Exposure Screen- collateral (i.e. third-party, chart review, belongings, etc; include EMS and ED staff)   ---------------------------------------------------  1. Has the patient had a COVID-19 test in the last 90 days? Unknown.   2. Has the patient tested positive for COVID-19 antibodies? Unknown.   3. Has the patient received 2 doses of the COVID-19 vaccine? Unknown  4. In the past 10 days, has the patient been around anyone with a positive COVID-19 test?* Unknown.   5. Has the patient been out of [place] within the past 10 days? Unknown

## 2023-08-15 NOTE — BH INPATIENT PSYCHIATRY ASSESSMENT NOTE - NSBHMETABOLIC_PSY_ALL_CORE_FT
BMI: BMI (kg/m2): 30.8 (08-15-23 @ 07:51)  HbA1c:   Glucose:   BP: 123/82 (08-15-23 @ 09:21) (109/70 - 123/82)  Lipid Panel:  BMI: BMI (kg/m2): 30.8 (08-15-23 @ 07:51)  HbA1c:   Glucose:   BP: 117/79 (08-15-23 @ 17:43) (109/70 - 123/82)  Lipid Panel:

## 2023-08-15 NOTE — BH INPATIENT PSYCHIATRY ASSESSMENT NOTE - OTHER PAST PSYCHIATRIC HISTORY (INCLUDE DETAILS REGARDING ONSET, COURSE OF ILLNESS, INPATIENT/OUTPATIENT TREATMENT)
Dx of Scz  Hx of inpatient psych admission 6 months ago- [facility] 05/23  Has an outpt psychiatrist in [facility] but noc compliant with Rx

## 2023-08-15 NOTE — BH SOCIAL WORK INITIAL PSYCHOSOCIAL EVALUATION - OTHER PAST PSYCHIATRIC HISTORY (INCLUDE DETAILS REGARDING ONSET, COURSE OF ILLNESS, INPATIENT/OUTPATIENT TREATMENT)
Pt is a 42 yo male from [place], currently undomiciled, and unemployed with PPhx of psychosis, schizophrenia, with multiple hospitalizations last in 05/23 at [facility], not currently connected to outpatient care, who came by self to ED requesting help for hearing voice and SI after patient was informed his daughter committed suicide.

## 2023-08-15 NOTE — BH INPATIENT PSYCHIATRY ASSESSMENT NOTE - NSBHCHARTREVIEWVS_PSY_A_CORE FT
Vital Signs Last 24 Hrs  T(C): 36.7 (08-15-23 @ 09:21), Max: 36.9 (08-14-23 @ 21:25)  T(F): 98.1 (08-15-23 @ 09:21), Max: 98.4 (08-14-23 @ 21:25)  HR: 71 (08-15-23 @ 09:21) (70 - 91)  BP: 123/82 (08-15-23 @ 09:21) (109/70 - 123/82)  BP(mean): --  RR: 16 (08-15-23 @ 09:21) (16 - 18)  SpO2: 99% (08-15-23 @ 09:21) (99% - 99%)     Vital Signs Last 24 Hrs  T(C): 36.7 (08-15-23 @ 17:43), Max: 36.9 (08-14-23 @ 21:25)  T(F): 98 (08-15-23 @ 17:43), Max: 98.4 (08-14-23 @ 21:25)  HR: 75 (08-15-23 @ 17:43) (70 - 91)  BP: 117/79 (08-15-23 @ 17:43) (109/70 - 123/82)  BP(mean): --  RR: 16 (08-15-23 @ 17:43) (16 - 18)  SpO2: 98% (08-15-23 @ 17:43) (98% - 99%)

## 2023-08-15 NOTE — ED BEHAVIORAL HEALTH ASSESSMENT NOTE - DESCRIPTION
Pt was calm and engaging None reported Lives in [facility], Single, Works in [employer].

## 2023-08-15 NOTE — BH INPATIENT PSYCHIATRY ASSESSMENT NOTE - NSBHPHYSICALEXAM_PSY_ALL_CORE
T(C): 36.7 (08-15-23 @ 09:21), Max: 36.9 (08-14-23 @ 21:25)  HR: 71 (08-15-23 @ 09:21) (70 - 91)  BP: 123/82 (08-15-23 @ 09:21) (109/70 - 123/82)  RR: 16 (08-15-23 @ 09:21) (16 - 18)  SpO2: 99% (08-15-23 @ 09:21) (99% - 99%)    CONSTITUTIONAL: Well groomed, no apparent distress  EYES: PERRLA and symmetric, EOMI, No conjunctival or scleral injection, non-icteric  ENMT: Oral mucosa with moist membranes. Normal dentition; no pharyngeal injection or exudates  RESP: No respiratory distress, no use of accessory muscles; CTA b/l, no WRR  CV: RRR, +S1S2, no MRG; no JVD; no peripheral edema  GI: Soft, NT, ND, no rebound, no guarding; no palpable masses; no hepatosplenomegaly; no hernia palpated  MSK: Normal gait;   SKIN: No rashes or ulcers noted; no subcutaneous nodules or induration palpable  NEURO: CN II-XII intact

## 2023-08-15 NOTE — BH INPATIENT PSYCHIATRY ASSESSMENT NOTE - NSBHATTESTCOMMENTATTENDFT_PSY_A_CORE
Pt is a 44 yo male from [place], currently undomiciled, and unemployed with PPhx of psychosis, schizophrenia, with multiple hospitalizations last in 05/23 at [facility], not currently connected to outpatient care, who came by self to ED requesting help for hearing voice and SI after patient was informed his daughter committed suicide.  Pt last seen by this MD March 2020, at that time he was being cross-titrated from Risperdal to Zyprexa with good effect; pt was eventually discharged on 20mg.  Pt reports he simply stopped taking Zyprexa, and that it did not cause any unwanted side effects or lose effectiveness.  Will restart Zyprexa this evening at 5mg.

## 2023-08-15 NOTE — BH TREATMENT PLAN - NSTXSUICIDINTERRN_PSY_ALL_CORE
Establish a trusting relationship. Assess pt for suicidality or urge to harm self. Encourage pt to verbalize feelings.

## 2023-08-15 NOTE — BH TREATMENT PLAN - NSTXCAREGIVERAGREEMENT_PSY_P_CORE
"Injectable Influenza Immunization Documentation    1.  Has the patient received the information for the injectable influenza vaccine? YES     2. Is the patient 6 months of age or older? YES     3. Does the patient have any of the following contraindications?         Severe allergy to eggs? No     Severe allergic reaction to previous influenza vaccines? No   Severe allergy to latex? No       History of Guillain-[place] syndrome? No     Currently have a temperature greater than 100.4F? No        4.  Severely egg allergic patients should have flu vaccine eligibility assessed by an MD, RN, or pharmacist, and those who received flu vaccine should be observed for 15 min by an MD, RN, Pharmacist, Medical Technician, or member of clinic staff.\": YES    5. Latex-allergic patients should be given latex-free influenza vaccine Yes. Please reference the Vaccine latex table to determine if your clinic s product is latex-containing.       Vaccination given by Gina Luis CMA      Prior to immunization administration, verified patients identity using patient s name and date of birth. Please see Immunization Activity for additional information.     Screening Questionnaire for Pediatric Immunization    Is the child sick today?   No   Does the child have allergies to medications, food, a vaccine component, or latex?   No   Has the child had a serious reaction to a vaccine in the past?   No   Does the child have a long-term health problem with lung, heart, kidney or metabolic disease (e.g., diabetes), asthma, a blood disorder, no spleen, complement component deficiency, a cochlear implant, or a spinal fluid leak?  Is he/she on long-term aspirin therapy?   No   If the child to be vaccinated is 2 through 4 years of age, has a healthcare provider told you that the child had wheezing or asthma in the  past 12 months?   No   If your child is a baby, have you ever been told he or she has had intussusception?   No   Has the child, sibling or " parent had a seizure, has the child had brain or other nervous system problems?   No   Does the child have cancer, leukemia, AIDS, or any immune system         problem?   No   Does the child have a parent, brother, or sister with an immune system problem?   No   In the past 3 months, has the child taken medications that affect the immune system such as prednisone, other steroids, or anticancer drugs; drugs for the treatment of rheumatoid arthritis, Crohn s disease, or psoriasis; or had radiation treatments?   No   In the past year, has the child received a transfusion of blood or blood products, or been given immune (gamma) globulin or an antiviral drug?   No   Is the child/teen pregnant or is there a chance that she could become       pregnant during the next month?   No   Has the child received any vaccinations in the past 4 weeks?   No      Immunization questionnaire answers were all negative.       Per orders of Dr. Rubalcava, injection of Menactra given by Gina Luis CMA. Patient instructed to remain in clinic for 15 minutes afterwards, and to report any adverse reaction to me immediately.    Screening performed by Gina Luis CMA on 10/27/2020 at 2:38 PM.     Patient does not have family/caregiver involved in care

## 2023-08-15 NOTE — BH INPATIENT PSYCHIATRY ASSESSMENT NOTE - NSBHASSESSSUMMFT_PSY_ALL_CORE
Pt is a 44 yo male from [place], currently undomiciled, and unemployed with PPhx of psychosis, schizophrenia, with multiple hospitalizations last in 05/23 at [facility], not currently connected to outpatient care, who came by self to ED requesting help for hearing voice and SI after patient was informed his daughter committed suicide. Per ED note, he reported poor sleep and passive suicidal ideation in addition to ELVER. Utox positive for marijuana and cocaine. Upon initial evaluation with treatment team patient sleeping in bed but willing to talk. Patient was calm, cooperative and in good behavioral control. Endorses feeling depressed since his daughter's suicide about 4-5 days ago. Patient also endorses VH at night. Patient currently denies any SI/I/P, HI/I/P, or AH. Patient has hx of non adherance to medications but willing to restart zyprexa as this has been most helpful in the past. There is also secondary gain as patient has no housing in [facility]. Differential includes substance induced mood/psychosis, adjustment disorder, major depressive episode, or schizophrenia. More collateral can hopefully be obtained tomorrow.       Plan  -Start Zyprexa 5 mg oral nightly  -Haldol 5 mg oral/ativan 2 mg oral for agitation   -Obtain collateral

## 2023-08-16 LAB
CHOLEST SERPL-MCNC: 186 MG/DL — SIGNIFICANT CHANGE UP
HDLC SERPL-MCNC: 77 MG/DL — SIGNIFICANT CHANGE UP
LIPID PNL WITH DIRECT LDL SERPL: 90 MG/DL — SIGNIFICANT CHANGE UP
NON HDL CHOLESTEROL: 109 MG/DL — SIGNIFICANT CHANGE UP
TRIGL SERPL-MCNC: 93 MG/DL — SIGNIFICANT CHANGE UP

## 2023-08-16 PROCEDURE — 99231 SBSQ HOSP IP/OBS SF/LOW 25: CPT

## 2023-08-16 RX ORDER — FLUPHENAZINE HYDROCHLORIDE 1 MG/1
5 TABLET, FILM COATED ORAL AT BEDTIME
Refills: 0 | Status: DISCONTINUED | OUTPATIENT
Start: 2023-08-16 | End: 2023-08-17

## 2023-08-16 RX ADMIN — Medication 30 MILLILITER(S): at 21:55

## 2023-08-16 RX ADMIN — FLUPHENAZINE HYDROCHLORIDE 5 MILLIGRAM(S): 1 TABLET, FILM COATED ORAL at 21:54

## 2023-08-16 RX ADMIN — Medication 200 MILLIGRAM(S): at 14:18

## 2023-08-16 RX ADMIN — Medication 50 MILLIGRAM(S): at 21:55

## 2023-08-16 RX ADMIN — Medication 200 MILLIGRAM(S): at 15:18

## 2023-08-16 NOTE — BH INPATIENT PSYCHIATRY PROGRESS NOTE - NSBHFUPINTERVALHXFT_PSY_A_CORE
Chart and vitals reviewed. No acute events overnight however patient did not take zyprexa. Upon evaluation today patient stated he is "ready to go". States that the tylenol he received has helped with all his symptoms including his "depression" and "mood stabilization". When asked about his daughter's recent suicide attempt patient appears euthymic and states that there is nothing he can do anymore about this. Denies any current passive or active SI/I/P, HI/I/P, VH/AH. Patient states that he is no longer interested in zyprexa as he does not like the way the medication makes him feel. Mentioned wanting to go back on lithium but willing to try another medication. Patient future oriented asking about possible housing options in [facility] or whether it would be possible to get a ticket for an [employer] train from [facility] to [place]. Patient denies any collateral contacts. Patient uninterested in rehab or outpatient programs for substance use.

## 2023-08-16 NOTE — BH INPATIENT PSYCHIATRY PROGRESS NOTE - PRN MEDS
MEDICATIONS  (PRN):  aluminum hydroxide/magnesium hydroxide/simethicone Suspension 30 milliLiter(s) Oral every 4 hours PRN Dyspepsia  diphenhydrAMINE 50 milliGRAM(s) Oral every 6 hours PRN Rash and/or Itching  haloperidol     Tablet 5 milliGRAM(s) Oral every 6 hours PRN agitation  ibuprofen  Tablet. 200 milliGRAM(s) Oral every 6 hours PRN Moderate Pain (4 - 6)  LORazepam     Tablet 2 milliGRAM(s) Oral every 8 hours PRN agitation

## 2023-08-16 NOTE — BH INPATIENT PSYCHIATRY PROGRESS NOTE - NSBHCHARTREVIEWVS_PSY_A_CORE FT
Vital Signs Last 24 Hrs  T(C): 36.4 (08-16-23 @ 10:00), Max: 36.7 (08-15-23 @ 17:43)  T(F): 97.5 (08-16-23 @ 10:00), Max: 98 (08-15-23 @ 17:43)  HR: 74 (08-16-23 @ 10:00) (74 - 75)  BP: 121/80 (08-16-23 @ 10:00) (117/79 - 121/80)  BP(mean): --  RR: 17 (08-16-23 @ 10:00) (16 - 17)  SpO2: 99% (08-16-23 @ 10:00) (98% - 99%)

## 2023-08-16 NOTE — BH INPATIENT PSYCHIATRY PROGRESS NOTE - NSBHASSESSSUMMFT_PSY_ALL_CORE
Pt is a 44 yo male from [place], currently undomiciled, and unemployed with PPhx of psychosis, schizophrenia, with multiple hospitalizations last in 05/23 at [facility], not currently connected to outpatient care, who came by self to ED requesting help for hearing voice and SI after patient was informed his daughter committed suicide. Per ED note, he reported poor sleep and passive suicidal ideation in addition to ELVER. Utox positive for marijuana and cocaine. Upon initial evaluation with treatment team patient sleeping in bed but willing to talk. Patient was calm, cooperative and in good behavioral control. Endorses feeling depressed since his daughter's suicide about 4-5 days ago. Patient also endorses VH at night. Patient currently denies any SI/I/P, HI/I/P, or AH. Patient has hx of non adherance to medications but willing to restart zyprexa as this has been most helpful in the past. There is also secondary gain as patient has no housing in [facility]. Differential includes substance induced mood/psychosis, adjustment disorder, major depressive episode, or schizophrenia. More collateral can hopefully be obtained tomorrow.       Today, patient appears euthymic and denies any suicidal ideation intent or plan. Patient is disorganized saying that medications like tylenol have helped his mood and depression and is ready to go home. Patient vague regarding the reason for quick improvement of his symptoms after a devastating family event. Denies any HI/I/P, VH/AH. Patient continues to decline giving collateral contacts. Is willing to start trial of prolixin 5 mg oral today instead of zyprexa.     Plan  -D/C Zyprexa 5 mg oral nightly  -Start prolixin 5 mg oral daily   -Haldol 5 mg oral/ativan 2 mg oral for agitation

## 2023-08-16 NOTE — BH INPATIENT PSYCHIATRY PROGRESS NOTE - NSBHATTESTCOMMENTATTENDFT_PSY_A_CORE
44 yo M hx schizophrenia, hospitalizations, presented with AH and SI, daughter  by suicide several days PTA, pt using cocaine PTA, tox +THC, cocaine. Pt seen on [facility].  He was oddly related, calm, somewhat disorganized, illogical speaking about Tylenol and Ensure.  He denied AH or SI.  He denied withdrawal sx.  VSS.  Pt refused Zyprexa last night, stated he has taken prolixin in the past with good effect, would agree to take that instead.   - stop Zyprexa  -start prolixin 5mg PO qHS per pt preference  -monitor off substances

## 2023-08-16 NOTE — BH INPATIENT PSYCHIATRY PROGRESS NOTE - NSBHMETABOLIC_PSY_ALL_CORE_FT
BMI: BMI (kg/m2): 30.8 (08-15-23 @ 07:51)  HbA1c:   Glucose:   BP: 121/80 (08-16-23 @ 10:00) (109/70 - 123/82)  Lipid Panel: Date/Time: 08-16-23 @ 10:00  Cholesterol, Serum: 186  Direct LDL: --  HDL Cholesterol, Serum: 77  Total Cholesterol/HDL Ration Measurement: --  Triglycerides, Serum: 93

## 2023-08-16 NOTE — BH INPATIENT PSYCHIATRY PROGRESS NOTE - CURRENT MEDICATION
MEDICATIONS  (STANDING):  fluPHENAZine 5 milliGRAM(s) Oral at bedtime    MEDICATIONS  (PRN):  aluminum hydroxide/magnesium hydroxide/simethicone Suspension 30 milliLiter(s) Oral every 4 hours PRN Dyspepsia  diphenhydrAMINE 50 milliGRAM(s) Oral every 6 hours PRN Rash and/or Itching  haloperidol     Tablet 5 milliGRAM(s) Oral every 6 hours PRN agitation  ibuprofen  Tablet. 200 milliGRAM(s) Oral every 6 hours PRN Moderate Pain (4 - 6)  LORazepam     Tablet 2 milliGRAM(s) Oral every 8 hours PRN agitation

## 2023-08-17 VITALS
SYSTOLIC BLOOD PRESSURE: 124 MMHG | DIASTOLIC BLOOD PRESSURE: 81 MMHG | RESPIRATION RATE: 18 BRPM | OXYGEN SATURATION: 100 % | HEART RATE: 77 BPM | TEMPERATURE: 98 F

## 2023-08-17 PROCEDURE — 85025 COMPLETE CBC W/AUTO DIFF WBC: CPT

## 2023-08-17 PROCEDURE — 99285 EMERGENCY DEPT VISIT HI MDM: CPT

## 2023-08-17 PROCEDURE — 87635 SARS-COV-2 COVID-19 AMP PRB: CPT

## 2023-08-17 PROCEDURE — 80048 BASIC METABOLIC PNL TOTAL CA: CPT

## 2023-08-17 PROCEDURE — 81001 URINALYSIS AUTO W/SCOPE: CPT

## 2023-08-17 PROCEDURE — 80307 DRUG TEST PRSMV CHEM ANLYZR: CPT

## 2023-08-17 PROCEDURE — 36415 COLL VENOUS BLD VENIPUNCTURE: CPT

## 2023-08-17 PROCEDURE — 80061 LIPID PANEL: CPT

## 2023-08-17 PROCEDURE — 99239 HOSP IP/OBS DSCHRG MGMT >30: CPT

## 2023-08-17 RX ORDER — FLUPHENAZINE HYDROCHLORIDE 1 MG/1
1 TABLET, FILM COATED ORAL
Qty: 0 | Refills: 0 | DISCHARGE
Start: 2023-08-17

## 2023-08-17 RX ORDER — FLUPHENAZINE HYDROCHLORIDE 1 MG/1
1 TABLET, FILM COATED ORAL
Qty: 30 | Refills: 0
Start: 2023-08-17 | End: 2023-09-15

## 2023-08-17 RX ADMIN — Medication 200 MILLIGRAM(S): at 09:39

## 2023-08-17 RX ADMIN — Medication 200 MILLIGRAM(S): at 10:35

## 2023-08-17 RX ADMIN — Medication 30 MILLILITER(S): at 09:39

## 2023-08-17 NOTE — BH INPATIENT PSYCHIATRY DISCHARGE NOTE - OTHER PAST PSYCHIATRIC HISTORY (INCLUDE DETAILS REGARDING ONSET, COURSE OF ILLNESS, INPATIENT/OUTPATIENT TREATMENT)
Pt is a 42 yo male from [place], currently undomiciled, and unemployed with PPhx of psychosis, schizophrenia, with multiple hospitalizations last in 05/23 at [facility], not currently connected to outpatient care, who came by self to ED requesting help for hearing voice and SI after patient was informed his daughter committed suicide.

## 2023-08-17 NOTE — BH DISCHARGE NOTE NURSING/SOCIAL WORK/PSYCH REHAB - NSCDUDCCRISIS_PSY_A_CORE
[facility]  [phone] [facility] ([phone][facility]  Text "WELL" to [phone]  Website: [email or web address] Suicide and Crisis Lifeline

## 2023-08-17 NOTE — BH DISCHARGE NOTE NURSING/SOCIAL WORK/PSYCH REHAB - NSDCPRGOAL_PSY_ALL_CORE
Pt. was visible on the unit, social with peers, and attended select groups. Pt. enjoyed creating beaded jewelry in open art studio. With support, pt. was able to complete a safety plan. Pt. was tangential and somewhat oddly related during safety planning. Pt. showed some insight in identifying triggers, warning signs, and coping strategies. Pt. struggled in identifying professionals or others that he can call in a crisis as pt. is estranged from family and lacks support. Pt. plans to call 911 or the suicide prevention lifeline in an emergency. Pt.'s reasons for living include his "love of life" and traveling.

## 2023-08-17 NOTE — BH INPATIENT PSYCHIATRY DISCHARGE NOTE - HPI (INCLUDE ILLNESS QUALITY, SEVERITY, DURATION, TIMING, CONTEXT, MODIFYING FACTORS, ASSOCIATED SIGNS AND SYMPTOMS)
Pt is a 42 yo male from [place], currently undomiciled, and unemployed with PPhx of psychosis, schizophrenia, with multiple hospitalizations last in 05/23 at [facility], not currently connected to outpatient care, who came by self to ED requesting help for hearing voice and SI after patient was informed his daughter committed suicide.     Per ED telepsychiatry note "pt reported he is from [place] and he came from there to [facility] to seek help. he reported his daughter commited suicide 4 days ago and he came to know about that. He reported not being able to sleep and was drinking hoping that he would die. He denied any active SI, but stated he hoped he would die. He stated hearing voice telling him to get hit by a car and calling his names. He also reported seeing things like "people, ghost" from couple weeks. He stated went to 2 ED in [facility] but 1st ED was full and 2nd ED did not have any room to admit him so he decided to come to [facility] to seek help. He had tags from other hospitals on his wrist. He stated he has a lot of problems and stressors in [facility] so he came to [facility]. He reported having a psychiatrist whom he seems every 8 months so he could not approach him. He reported using cocaine 8 hrs ago."    Upon evaluation with team today patient sleeping in bed but willing to talk. Patient was calm, cooperative and in good behavioral control. Patient states that he came to [facility] to come to [facility] as he has been here in the past and had a good experience. Patient states he has had numerous stressors in the past 6 months including the death of his mother and now his daughter 4 days ago. Patient states that he was doing well before coming to the hospital and denies any symptoms of depression. Patient endorses seeing visual hallucinations at night. Patient denies any current SI/I/P, HI/I/P, VH/AH. Patient reports feeling safe on the unit and denies any paranoid ideation. Patient reports success on lithium and zyprexa but stops taking his medications upon discharge. Patient is interested moving to [place] for a new start. Patient states that he was in [facility] recently trying to "organize his papers" to live there. 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

## 2023-08-17 NOTE — BH INPATIENT PSYCHIATRY DISCHARGE NOTE - NSBHASSESSSUMMFT_PSY_ALL_CORE
44 YO M with history of schizophrenia and multiple past psych hospitalizations reporting SI in context of being informed his daughter killed herself. He was on 8U prior to COVID. Pt's mood andpsychotic symptoms improved very quickly and pt was requesting discharge and put in a 72 hour letter very soon.

## 2023-08-17 NOTE — BH INPATIENT PSYCHIATRY DISCHARGE NOTE - HOSPITAL COURSE
Pt started on prolixin 5 mg qhs for psychosis. He improved very quickly in the hospital. Mood and psychotic sx seemed much imrpvoed even before lexapro. He will be given a 30 day supply of prolixin 5 mg at night for psychosis. He should continue to take this medication until directed not to by his provider.

## 2023-08-17 NOTE — BH DISCHARGE NOTE NURSING/SOCIAL WORK/PSYCH REHAB - PATIENT PORTAL LINK FT
You can access the FollowMyHealth Patient Portal offered by [facility] by registering at the following website: [email or web address]. By joining [employer]’s FollowMyHealth portal, you will also be able to view your health information using other applications (apps) compatible with our system.

## 2023-08-17 NOTE — BH DISCHARGE NOTE NURSING/SOCIAL WORK/PSYCH REHAB - NSDCOTHERTYPOFSERV_GEN_ALL_CORE
At any hour of any day, in almost any language, from phone, tablet or computer, [facility] Well is your connection to get the help you need:    Suicide prevention and crisis counseling  Peer support and short-term counseling via telephone, text and web  Referrals and warm transfer to other services  Follow-up to check that you have connected to care and it is working for you

## 2023-08-17 NOTE — BH INPATIENT PSYCHIATRY DISCHARGE NOTE - NSBHMETABOLIC_PSY_ALL_CORE_FT
BMI: BMI (kg/m2): 30.8 (08-15-23 @ 07:51)  HbA1c:   Glucose:   BP: 124/81 (08-17-23 @ 09:07) (109/70 - 124/81)  Lipid Panel: Date/Time: 08-16-23 @ 10:00  Cholesterol, Serum: 186  Direct LDL: 90  HDL Cholesterol, Serum: 77  Total Cholesterol/HDL Ration Measurement: --  Triglycerides, Serum: 93

## 2023-08-17 NOTE — BH INPATIENT PSYCHIATRY DISCHARGE NOTE - REASON FOR ADMISSION
44 YO M with history of schizophrenia and multiple past psych hospitalizations reporting SI in context of being informed his daughter killed herself. He was on 8U prior to COVID.

## 2023-08-17 NOTE — BH INPATIENT PSYCHIATRY DISCHARGE NOTE - NSBHFUPINTERVALHXFT_PSY_A_CORE
Pt took prolixin 5 mg qhs. He seems in a better mood and his voices are under better control. -SI/HI/AH/VH/PI.

## 2023-08-17 NOTE — BH SAFETY PLAN - WARNING SIGN 1
Pt. completed a written safety plan and was provided a copy; an additional copy can be found in pt.'s chart.

## 2023-08-18 NOTE — BH SOCIAL WORK CONFIRMATION FOLLOW UP NOTE - NSCOMMENTS_PSY_ALL_CORE
CARING CALL 8/17/23: DIANA called pt x3 but was unsuccessful. VM was left request callback.    LINKAGE CALL 8/17: DIANA call [facility] [phone] and spoke with staff who stated they are unable to confirm or deny pt attended appt.              CARING CALL 8/17/23: SW called pt x3 but was unsuccessful. VM was left requesting callback.    LINKAGE CALL 8/17: DIANA call [facility] [phone] and spoke with staff who stated they are unable to confirm or deny pt attended appt.

## 2023-08-21 DIAGNOSIS — F14.90 COCAINE USE, UNSPECIFIED, UNCOMPLICATED: ICD-10-CM

## 2023-08-21 DIAGNOSIS — R45.851 SUICIDAL IDEATIONS: ICD-10-CM

## 2023-08-21 DIAGNOSIS — R44.3 HALLUCINATIONS, UNSPECIFIED: ICD-10-CM

## 2023-08-21 DIAGNOSIS — F29 UNSPECIFIED PSYCHOSIS NOT DUE TO A SUBSTANCE OR KNOWN PHYSIOLOGICAL CONDITION: ICD-10-CM

## 2023-08-21 DIAGNOSIS — F12.90 CANNABIS USE, UNSPECIFIED, UNCOMPLICATED: ICD-10-CM

## 2023-08-21 DIAGNOSIS — F32.9 MAJOR DEPRESSIVE DISORDER, SINGLE EPISODE, UNSPECIFIED: ICD-10-CM

## 2023-08-21 DIAGNOSIS — Z91.199 PATIENT'S NONCOMPLIANCE WITH OTHER MEDICAL TREATMENT AND REGIMEN DUE TO UNSPECIFIED REASON: ICD-10-CM

## 2023-08-21 DIAGNOSIS — F20.9 SCHIZOPHRENIA, UNSPECIFIED: ICD-10-CM

## 2024-01-24 NOTE — PROGRESS NOTE BEHAVIORAL HEALTH - NSBHADMITIPOBSFT_PSY_A_CORE
[safety

## 2024-05-18 NOTE — PROGRESS NOTE BEHAVIORAL HEALTH - INSIGHT (REGARDING PSYCHIATRIC ILLNESS)
Care Due:                  Date            Visit Type   Department     Provider  --------------------------------------------------------------------------------                                [facility]                              [facility]  [facility]      [facility] ([facility])   [facility]  Nelly                              [facility]                              [facility]  Next Visit: 08-      [facility] ([facility])   [facility]  Nelly                                                            Last  Test          Frequency    Reason                     Performed    Due Date  --------------------------------------------------------------------------------    K...........  12 months..  losartan.................  Not Found    Overdue    [employer] Embedded Care Due Messages. Reference number: [id number].   5/18/2024 6:18:21 PM CDT   Poor

## 2024-05-20 NOTE — PROGRESS NOTE BEHAVIORAL HEALTH - ORIENTED TO SITUATION
VIRAL ILLNESSES:    The frequency of viral illness in a child's life varies depending on attendance in day care (in a center or in a private sitter setting), siblings' ill contacts and the child's own immunity.  It is not unusual for a child who has not had many exposures to have between 8-10 \"colds\" per year which usually cluster in the months of November to April.  Each viral illness can last 10 to 14 days, and if two occur in the same month that leaves only a couple days symptom free per month.  People tend to have good years when they are rarely sick and bad years when they seem to never be well.    In order to help you decide when your child needs to be seen, here are some guidelines:   -  The typical viral illness begins with nasal stuffiness and/or a sore throat.   -  Any fever the child gets usually starts within 48 hours of the illness.  A fever in a child less than 1 year old is defined as 100.4 or higher.  A fever in a child 1 year or older is 100.  A child's metabolic rate is higher than an adult's due to growth and therefore the normal temperature tends to be higher.  The fever should not usually last more than 72 hours.   -  Symptoms tend to worsen for 3 to 5 days then slowly improve for the following 5 days.   -  Rashes often occur with a viral illness.  In general they erupt after the fever.  If your child has a rash during the fever, he should be seen.   -  Strep is generally a sudden onset of fever and sore throat.  It may be accompanied by headache, stomachache and vomiting but usually does NOT include cold symptoms.   -  If your child has a fever that is gone (without medication) for 24 hours, the fever should not return.  While it is possible that he has caught another virus, there is a good chance he has developed a bacterial infection (ear infection, sinus infection, lung infection) that may benefit from antibiotics and should be seen.   -  The color of nasal drainage has been shown in studies  to NOT be 100% reliable as an indicator of an infection source.  It is possible for a bacterial infection to be associated with clear nasal discharge.  It is often seen that the yellow or green discharge is just the natural process of thickening of clear discharge and is viral in origin.  If the drainage is ONLY green for SEVERAL days, it is likely to be bacterial but not absolutely guaranteed to be.    Symptomatic care of viral upper respiratory infections (URIs):  Although multiple medications are on the market for cold symptoms, they are not always effective.  Lack of improvement with over the counter symptom relief medication does not mean that antibiotics are needed.  To find the medication best to use consider the following:   -  Antihistamines - (ages 6 years and older) advertised for symptom relief of runny nose.  These medications will help by thickening up or drying out the nasal drainage and often will decrease cough by eliminating post nasal drainage.  The risk of ear infections and sinus infection is increased with antihistamine use for colds.   -  Decongestants - (ages 6 years and older) studies show that the right decongestant can decrease nasal stuffiness and increase comfort although the duration of the illness is not shortened by their use.  The ingredient phenylephrine that is available on the shelves at the store has NOT been shown to be effective.  The ingredient pseudoephedrine that you have to obtain behind the counter from the pharmacy and give identification to obtain HAS been shown to be effective as a decongestant.  The side effect of the medication is much like giving your child a big cup of coffee and may disturb their sleep, so dosing only in the morning or early afternoon is recommended.   -  Expectorants - (ages 6 years and older) in studies, expectorants have not been shown to be effective in children.  They may result in stomachache.   -  Cough Suppressants - (ages 6 years and  older) dextromethorphan (DM) is effective in helping to modestly decrease cough due to colds.  The cough is your body's defense against pneumonia, allowing you to clear the lungs of mucus.  If the cough is productive and significant, cough suppressants are unlikely to help.  The use of prescription cough suppressants increases the risk of pneumonia.   -  Tylenol/Ibuprofen - usually very effective for decreasing discomfort associated with upper respiratory infections.  A fever by itself does not necessarily need to be treated, as it helps to fight infection, but if the child is uncomfortable these medications are useful.    Though there is no evidence of secondary infection at this time, I am not able to predict that this could not become a diagnosis later on, as the absence of the problem at this time is, regrettably, no guarantee.    No

## 2024-09-26 ENCOUNTER — HOSPITAL ENCOUNTER (EMERGENCY)
Facility: HOSPITAL | Age: 44
End: 2024-09-27
Attending: EMERGENCY MEDICINE
Payer: COMMERCIAL

## 2024-09-26 DIAGNOSIS — R45.851 SUICIDAL IDEATION: Primary | ICD-10-CM

## 2024-09-26 DIAGNOSIS — R44.3 HALLUCINATIONS: ICD-10-CM

## 2024-09-26 DIAGNOSIS — F19.90 SUBSTANCE USE DISORDER: ICD-10-CM

## 2024-09-26 LAB
ALBUMIN SERPL-MCNC: 4.5 G/DL (ref 3.5–5.7)
ALP SERPL-CCNC: 54 IU/L (ref 34–125)
ALT SERPL-CCNC: 59 IU/L (ref 7–52)
AMPHET UR QL SCN: NOT DETECTED
ANION GAP SERPL CALC-SCNC: 7 MEQ/L (ref 3–15)
APAP SERPL-MCNC: 0.4 UG/ML (ref 10–30)
AST SERPL-CCNC: 58 IU/L (ref 13–39)
BARBITURATES UR QL SCN: NOT DETECTED
BASOPHILS # BLD: 0.04 K/UL (ref 0.01–0.1)
BASOPHILS NFR BLD: 0.5 %
BENZODIAZ UR QL SCN: NOT DETECTED
BILIRUB SERPL-MCNC: 0.3 MG/DL (ref 0.3–1.2)
BUN SERPL-MCNC: 18 MG/DL (ref 7–25)
CALCIUM SERPL-MCNC: 9.6 MG/DL (ref 8.6–10.3)
CANNABINOIDS UR QL SCN: POSITIVE
CHLORIDE SERPL-SCNC: 104 MEQ/L (ref 98–107)
CO2 SERPL-SCNC: 28 MEQ/L (ref 21–31)
COCAINE UR QL SCN: NOT DETECTED
CREAT SERPL-MCNC: 1.1 MG/DL (ref 0.7–1.3)
DIFFERENTIAL METHOD BLD: ABNORMAL
EGFRCR SERPLBLD CKD-EPI 2021: >60 ML/MIN/1.73M*2
EOSINOPHIL # BLD: 0.18 K/UL (ref 0.04–0.54)
EOSINOPHIL NFR BLD: 2.2 %
ERYTHROCYTE [DISTWIDTH] IN BLOOD BY AUTOMATED COUNT: 15.2 % (ref 11.6–14.4)
ETHANOL SERPL-MCNC: <10 MG/DL
FENTANYL URINE SCR: NOT DETECTED
GLUCOSE SERPL-MCNC: 119 MG/DL (ref 70–99)
HCT VFR BLD AUTO: 40.3 % (ref 40.1–51)
HGB BLD-MCNC: 14.2 G/DL (ref 13.7–17.5)
IMM GRANULOCYTES # BLD AUTO: 0.02 K/UL (ref 0–0.08)
IMM GRANULOCYTES NFR BLD AUTO: 0.2 %
LYMPHOCYTES # BLD: 1.8 K/UL (ref 1.2–3.5)
LYMPHOCYTES NFR BLD: 22.4 %
MCH RBC QN AUTO: 26.9 PG (ref 28–33.2)
MCHC RBC AUTO-ENTMCNC: 35.2 G/DL (ref 32.2–36.5)
MCV RBC AUTO: 76.5 FL (ref 83–98)
MONOCYTES # BLD: 0.83 K/UL (ref 0.3–1)
MONOCYTES NFR BLD: 10.3 %
NEUTROPHILS # BLD: 5.16 K/UL (ref 1.7–7)
NEUTS SEG NFR BLD: 64.4 %
NRBC BLD-RTO: 0 %
OPIATES UR QL SCN: NOT DETECTED
PCP UR QL SCN: POSITIVE
PDW BLD AUTO: 10.8 FL (ref 9.4–12.4)
PLATELET # BLD AUTO: 203 K/UL (ref 150–350)
POTASSIUM SERPL-SCNC: 4.3 MEQ/L (ref 3.5–5.1)
PROT SERPL-MCNC: 7.6 G/DL (ref 6–8.2)
RBC # BLD AUTO: 5.27 M/UL (ref 4.5–5.8)
SALICYLATES SERPL-MCNC: <1.5 MG/DL
SARS-COV-2 AG RESP QL IA.RAPID: NORMAL
SODIUM SERPL-SCNC: 139 MEQ/L (ref 136–145)
VALPROATE SERPL-MCNC: <4 UG/ML (ref 50–100)
WBC # BLD AUTO: 8.03 K/UL (ref 3.8–10.5)

## 2024-09-26 PROCEDURE — 80307 DRUG TEST PRSMV CHEM ANLYZR: CPT | Performed by: EMERGENCY MEDICINE

## 2024-09-26 PROCEDURE — 85025 COMPLETE CBC W/AUTO DIFF WBC: CPT | Performed by: EMERGENCY MEDICINE

## 2024-09-26 PROCEDURE — G0480 DRUG TEST DEF 1-7 CLASSES: HCPCS

## 2024-09-26 PROCEDURE — 87426 SARSCOV CORONAVIRUS AG IA: CPT | Performed by: EMERGENCY MEDICINE

## 2024-09-26 PROCEDURE — 99285 EMERGENCY DEPT VISIT HI MDM: CPT

## 2024-09-26 PROCEDURE — 80053 COMPREHEN METABOLIC PANEL: CPT | Performed by: EMERGENCY MEDICINE

## 2024-09-26 PROCEDURE — 80164 ASSAY DIPROPYLACETIC ACD TOT: CPT

## 2024-09-26 PROCEDURE — 80053 COMPREHEN METABOLIC PANEL: CPT

## 2024-09-26 PROCEDURE — 36415 COLL VENOUS BLD VENIPUNCTURE: CPT

## 2024-09-26 PROCEDURE — 85025 COMPLETE CBC W/AUTO DIFF WBC: CPT

## 2024-09-26 PROCEDURE — G0480 DRUG TEST DEF 1-7 CLASSES: HCPCS | Performed by: EMERGENCY MEDICINE

## 2024-09-26 ASSESSMENT — COGNITIVE AND FUNCTIONAL STATUS - GENERAL
ATTENTION: IMPAIRED, MILD
INSIGHT: IMPAIRED, MINIMALLY
SPEECH: REGULAR
SLEEP_WAKE_CYCLE: DECREASED
THOUGHT_PROCESS: WNL
CONCENTRATION: IMPAIRED, MILD
DELUSIONS: NONE OR AGE APPROPRIATE
JUDGEMENT: IMPAIRED, MINIMALLY
AFFECT: FULL RANGE
THOUGHT_CONTENT: APPROPRIATE
APPEARANCE: DISHEVELED
LIBIDO: NON-CONTRIBUTORY
IMPULSE CONTROL: INTACT
RECENT MEMORY: WNL
MOOD: DEPRESSED
AROUSAL LEVEL: ALERT
PSYCHOMOTOR FUNCTIONING: WNL
REMOTE MEMORY: WNL
ORIENTATION: FULLY ORIENTED
PERCEPTUAL FUNCTION: AUDITORY HALLUCINATIONS
EYE_CONTACT: FLEETING
APPETITE: NO CHANGE

## 2024-09-27 VITALS
DIASTOLIC BLOOD PRESSURE: 63 MMHG | RESPIRATION RATE: 17 BRPM | HEIGHT: 73 IN | TEMPERATURE: 98.7 F | HEART RATE: 71 BPM | WEIGHT: 236 LBS | BODY MASS INDEX: 31.28 KG/M2 | SYSTOLIC BLOOD PRESSURE: 109 MMHG | OXYGEN SATURATION: 98 %

## 2024-09-27 ASSESSMENT — ENCOUNTER SYMPTOMS
SPEECH DIFFICULTY: 0
CHILLS: 0
FEVER: 0
WEAKNESS: 0
COUGH: 0
COLOR CHANGE: 0
ABDOMINAL PAIN: 0
HALLUCINATIONS: 1
SHORTNESS OF BREATH: 0
AGITATION: 0
SEIZURES: 0
DIARRHEA: 0
HEADACHES: 0
VOMITING: 0
NECK PAIN: 0
ACTIVITY CHANGE: 0
BACK PAIN: 0
NAUSEA: 0
DIFFICULTY URINATING: 0

## 2024-09-27 NOTE — ED PROVIDER NOTES
"Emergency Medicine Note  HPI   HISTORY OF PRESENT ILLNESS     Patient is a 44-year-old male with a PMH of schizophrenia and substance use disorder presented to the ED for evaluation of suicidal ideations.  He reports losing his daughter a year ago to suicide and today upon waking up, began having suicidal ideations with plans to overdose on his Depakote.  He reports hearing voices that are instructing him to kill himself.  Patient was seen at [place] [facility] on 9/7 for drug overdose by PCP. He otherwise denies any suicidal attempts today, homicidal ideations, chest pain, shortness of breath, headaches, abdominal pain, nausea, vomiting, urinary symptoms, dizziness or syncope.  Patient reports using PCP and marijuana in the last 3 days.  Otherwise denies alcohol or other illicit drug use.      History provided by:  Patient        Patient History   PAST HISTORY     Reviewed from Nursing Triage:       No past medical history on file.    No past surgical history on file.    No family history on file.    Social History     Substance Use Topics    Alcohol use: Yes     Comment: Pt reported he drank a \"2.5% 5 hours ago.\"         Review of Systems   REVIEW OF SYSTEMS     Review of Systems   Constitutional:  Negative for activity change, chills and fever.   Respiratory:  Negative for cough and shortness of breath.    Cardiovascular:  Negative for chest pain and leg swelling.   Gastrointestinal:  Negative for abdominal pain, diarrhea, nausea and vomiting.   Genitourinary:  Negative for difficulty urinating.   Musculoskeletal:  Negative for back pain and neck pain.   Skin:  Negative for color change.   Neurological:  Negative for seizures, syncope, speech difficulty, weakness and headaches.   Psychiatric/Behavioral:  Positive for hallucinations and suicidal ideas. Negative for agitation and behavioral problems.          VITALS     ED Vitals      Date/Time Temp Pulse Resp BP SpO2 [facility]   09/27/24 0645 37.1 °C (98.7 °F) 71 17 109/63 98 % " AE   09/27/24 0035 -- 80 16 104/76 98 % JW   09/26/24 1921 37.3 °C (99.1 °F) 69 20 124/63 98 % AE          Pulse Ox %: 99 % (09/27/24 0039)  Pulse Ox Interpretation: Normal (09/27/24 0039)           Physical Exam   PHYSICAL EXAM     Physical Exam  Vitals and nursing note reviewed.   Constitutional:       General: He is not in acute distress.     Appearance: He is well-developed.   HENT:      Head: Normocephalic and atraumatic.   Eyes:      Conjunctiva/sclera: Conjunctivae normal.   Cardiovascular:      Rate and Rhythm: Normal rate and regular rhythm.   Pulmonary:      Effort: Pulmonary effort is normal.      Breath sounds: Normal breath sounds.   Abdominal:      General: There is no distension.      Palpations: Abdomen is soft. There is no mass.      Tenderness: There is no abdominal tenderness.   Musculoskeletal:         General: No tenderness or deformity. Normal range of motion.      Cervical back: Normal range of motion.   Skin:     General: Skin is warm and dry.   Neurological:      Mental Status: He is alert. Mental status is at baseline.   Psychiatric:         Behavior: Behavior normal.           PROCEDURES     Procedures     DATA     Results       Procedure Component Value Units Date/Time    Extra Tubes [[id number]] Collected: 09/26/24 1930    Specimen: Blood, Venous Updated: 09/27/24 0400    Narrative:      The following orders were created for panel order Extra Tubes.  Procedure                               Abnormality         Status                     ---------                               -----------         ------                     Extra Tube Red[[id number]]                                   Final result               Extra Tube Lt Green[[id number]]                              Final result                 Please view results for these tests on the individual orders.    Extra Tube Red [[id number]] Collected: 09/26/24 1930    Specimen: Blood, Venous Updated: 09/27/24 0400    Extra Tube Lt Green  [[id number]] Collected: 09/26/24 1930    Specimen: Blood, Venous Updated: 09/27/24 0400    SARS-COV-2, ANTIGEN Nares [[id number]]  (Normal) Collected: 09/26/24 2143    Specimen: Nasal Swab from Nares Updated: 09/26/24 2302     SARS-COV-2 (COVID-19), Antigen Presumptive Negative, no Ag detected    Narrative:      This test is approved by FDA under EUA use. The performance of this test is not validated for asymptomatic patients and test results should be correlated with patient's condition.        Urine Drug Screen [[id number]]  (Abnormal) Collected: 09/26/24 2120    Specimen: Urine, Clean Catch Updated: 09/26/24 2227     PCP Scrn, Ur Positive     Comment: Assay Detects: phencyclidine in urine. Lowest detectable concentration is 25 ng/mL of phencyclidine.        Benzodiazepine Ur Qual Not Detected     Comment: Assay Detects: benzodiazepines and metabolites at varying concentrations. Lowest detectable concentration is 200 ng/mL of oxazepam.        Cocaine Screen, Urine Not Detected     Comment: Assay Detects: benzoylecgonine and cocaine in urine. Lowest detectable concentration is 300 ng/mL of benzoylecgonine.        Amphetamine+Methamphetamine Screen, Ur Not Detected     Comment: Assay Detects: d-methamphetamine, d-amphetamine, methlyenedioxyamphetamine (MDA), and methlyenendioxymethamphetamine (MDMA) in urine. Lowest detectable concentration is 1000 ng/mL of d-methamphetamine.<br>Assay is less sensitive to MDA and MDMA (lowest detectable concentration, 2500 ng/mL) and could produce a false negative result. If MDMA overdose is suspected and the result is negative, a more specific test should be requested.        Cannabinoid Screen, Urine Positive     Comment: Assay Detects: cannabinoid metabolites in urine. Lowest detectable concentration is 50 ng/mL        Opiate Scrn, Ur Not Detected     Comment: Assay Detects: codeine, dihydrocodeine, hydrocodone, hydromorphone, levorphanol, morphine, morphine-3-glucuronide,  norcodeine, oxycodone in urine. Lowest detectable concentration is 300 ng/mL of morphine.        Barbiturate Screen, Ur Not Detected     Comment: Assay Detects: alphenal, amobarbital, aprobarbital, barbital, butabarbital, butalbital, butethal, diallybarbital, pentobarbital, secobarbital,talbutal, and thiopental. Lowest detectable concentration is 200 ng/mL of secobarbital.        Fentanyl Screen, Urine Not Detected     Comment: Assay Detects: fentanyl metabolite in urine, lowest detectable concentration is 5 ng/mL of norfentanyl.       Valproic acid [[id number]]  (Abnormal) Collected: 09/26/24 1930    Specimen: Blood, Venous Updated: 09/26/24 2120     Valproic Acid <4.0 ug/mL      Comment: Result rechecked         Toxicology Screen, serum [[id number]]  (Abnormal) Collected: 09/26/24 1929    Specimen: Blood, Venous Updated: 09/26/24 2016     Salicylate <1.5 mg/dL      Acetaminophen 0.4 ug/mL      Ethanol <10 mg/dL     Comprehensive metabolic panel [[id number]]  (Abnormal) Collected: 09/26/24 1929    Specimen: Blood, Venous Updated: 09/26/24 2003     Sodium 139 mEQ/L      Potassium 4.3 mEQ/L      Comment: Results obtained on plasma. Plasma Potassium values may be up to 0.4 mEQ/L less than serum values. The differences may be greater for patients with high platelet or white cell counts.        Chloride 104 mEQ/L      CO2 28 mEQ/L      BUN 18 mg/dL      Creatinine 1.1 mg/dL      Glucose 119 mg/dL      Calcium 9.6 mg/dL      AST (SGOT) 58 IU/L      ALT (SGPT) 59 IU/L      Alkaline Phosphatase 54 IU/L      Total Protein 7.6 g/dL      Comment: Test performed on plasma which typically contains approximately 0.4 g/dL more protein than serum.        Albumin 4.5 g/dL      Bilirubin, Total 0.3 mg/dL      eGFR >60.0 mL/min/1.73m*2      Comment: Calculation based on the Chronic Kidney Disease Epidemiology Collaboration (CKD-EPI) equation refit without adjustment for race.        Anion Gap 7 mEQ/L     CBC and differential [[id number]]   (Abnormal) Collected: 09/26/24 1929    Specimen: Blood, Venous Updated: 09/26/24 1939     WBC 8.03 K/uL      RBC 5.27 M/uL      Hemoglobin 14.2 g/dL      Hematocrit 40.3 %      MCV 76.5 fL      MCH 26.9 pg      MCHC 35.2 g/dL      RDW 15.2 %      Platelets 203 K/uL      MPV 10.8 fL      Differential Type Auto     nRBC 0.0 %      Immature Granulocytes 0.2 %      Neutrophils 64.4 %      Lymphocytes 22.4 %      Monocytes 10.3 %      Eosinophils 2.2 %      Basophils 0.5 %      Immature Granulocytes, Absolute 0.02 K/uL      Neutrophils, Absolute 5.16 K/uL      Lymphocytes, Absolute 1.80 K/uL      Monocytes, Absolute 0.83 K/uL      Eosinophils, Absolute 0.18 K/uL      Basophils, Absolute 0.04 K/uL             Imaging Results    None         No orders to display       Scoring tools                                  ED Course & MDM   MDM / ED COURSE / CLINICAL IMPRESSION / DISPO     Medical Decision Making  Problems Addressed:  Hallucinations: acute illness or injury  Substance use disorder: acute illness or injury  Suicidal ideation: acute illness or injury    Amount and/or Complexity of Data Reviewed  Independent Historian: EMS     Details: EMS provided prehospital history   External Data Reviewed: notes.     Details: [place] ED notes on 9/7/2024  Labs: ordered. Decision-making details documented in ED Course.    Risk  Decision regarding hospitalization.        ED Course as of 09/29/24 1530   Thu Sep 26, 2024   2014 Crisis made aware of patient [DO]   2243 Urine Drug Screen(!)  +PCP and cannabis  [DO]   2355 Patient is medically clear for psych placement  [DO]   Fri Sep 27, 2024   0104 [facility]  time 7am. [DO]      ED Course User Index  [DO] Marcelina Ibarra PA C     Clinical Impression      Suicidal ideation   Hallucinations   Substance use disorder     _________________       ED Disposition   Transfer to Behavioral Health                       Marcelina Ibarra PA C  09/29/24 1530

## 2024-09-27 NOTE — BEHAVIORAL HEALTH CRISIS PROGRESS NOTE
11:29pm    [facility] completed psych consult for pt who presents with +SI with a plan to overdose on pills. [facility] recommends inpatient admission on a voluntary admission. [facility] provided pt with voluntary rights and pt was able to sign 201 and transfer forms. Pt requests placement at [facility] or [facility]. [facility] spoke with Alissa at [facility] and Dilcia at [facility] and faxed clinical for review.     12:28am    [facility] spoke to Jo at [facility] who has accepted pt on a voluntary admission. The accepting physician will be Dr. Shepard. Jo reports pt can arrive after 7:30am in the morning. [facility] scheduled transport with Aleyda at [facility] with trip#[id number] and ETA 7am. [facility] made treatment team aware.

## 2024-09-27 NOTE — CONSULTS
Patient Information       Patient Name  Sanchez Wood MRN  [id number] Legal Sex  Male  Age  1980 (44 y.o.) [facility]             Admit Date Department Dept Phone    2024 [facility] Emergency Department [phone]           Presenting Problems - Thu 2024       Row Name 2316       Presenting Problems    Who accompanied patient today? Pt alone    Presenting Problems 45yo male with history of schizophrenia presents to the emergency department with suicidal thoughts. Pt reports arriving to the ED viz EMS after calling 911. Pt reports his daughter shot herself with a gun this morning committing suicide. Pt reports +SI with plan to overdose on pills. Pt reports history of suicide attempts reports trying to cut his neck three weeks ago. Pt reports history of inpatient admissions reporting his last admission was two weeks ago at [place] on a voluntary admission. Pt reports +CAH telling him to kill himself and reports daily PCP and THC use reports last use was this afternoon. Pt reports he lives alone and collects SSDI as income. Pt reports non-compliance with Zyprexa and denies having outpatient treatment. Pt reports low sleep, fair appetite and denies access to firearms, legal issues and history of SIB.    Patient Experiencing sleep disturbances;hopelessness    Sleep Disturbances Comments low sleep    Stressors daughter's death, substance use                   Mental Status Exam - Thu 2024       Row Name 2320       Mental Status Exam    Arousal Level Alert    Appearance Disheveled    Speech Regular    Psychomotor Functioning WNL    Eye Contact Fleeting    Orientation Fully oriented    Attention Impaired, Mild    Concentration Impaired, Mild    Recent Memory WNL    Remote Memory WNL    Thought Content Appropriate    Thought Process WNL    Insight Impaired, minimally    Judgement impaired, minimally    Impulse Control Intact    Perceptual Function Auditory  hallucinations    Delusions None or age appropriate    Sleeping Decreased    Appetite No Change    Libido Non-Contributory    Affect Full Range    Mood Depressed                   Suicide and Homicide Risk - Thu September 26, 2024       Row Name 2321       [facility] Suicide and Homicide Risk    Do you currently have any suicidal ideation or thoughts? Yes    Do you currently or have you had any thoughts of self-harm? No    Do you currently have homicidal ideation or have you ever harmed anyone else?  No    Do you have easy access to firearms? No                   Safe-T Assessment - Thu September 26, 2024       Row Name 2322       SAFE-T Assessment Risk Factors      Suicidal Behavior History of prior suicide attempts    Current/Past Psychiatric Disorders ETOH/Substance abuse;Psychotic disorders    Key symptoms Hopelessness    Precipitants/Stressors/Interpersonal/Triggers Events leading to humiliation, shame or despair    Change in Treatment Recent discharge from psychiatric hospital;Non-compliant or not receiving treatment    Access to fire arms. No       SAFE-T Assessment Protective Factors    Internal factors Mosque beliefs    External Factors Cultural       SAFE-T Assessment Suicidal Inquiry     In the last month, how many times have you had suicidal thoughts? Daily or almost daily    In the last month, when you have had suicidal thoughts, how long do they last? 1-4 hours/a lot of time    In the last month, could/can you stop thinking about killing yourself or wanting to die if you want to? can control thoughts with some difficulty    In the last month, are there things - anyone or anything (i.e. family, Quaker, pain of death) - that stopped you from wanting to die or acting on thoughts of suicide?  Does not apply    In the last month, what sorts of reasons did you have for thinking about wanting to die or killing yourself? Does not apply.                  Alcohol Use       Yes.    Comments: Pt reported he drank a  "\"2.5% 5 hours ago.\"          Tobacco Use       Never assessed smoking status.    Smokeless Tobacco: Unknown status of smokeless tobacco use.          Problem List  Current as of 09/26/24 2326             Delusion (CMS/[facility]) Non-traumatic rhabdomyolysis    Polysubstance abuse (CMS/[facility]) Psychotic disorder (CMS/[facility])    Tobacco abuse           Allergies    Morphine  Oxycodone       Results (last 24 hours)       Procedure Component Value Units Date/Time    Urine Drug Screen [[id number]]  (Abnormal) Collected: 09/26/24 2120    Order Status: Completed Specimen: Urine, Clean Catch Updated: 09/26/24 2227     PCP Scrn, Ur Positive     Benzodiazepine Ur Qual Not Detected     Cocaine Screen, Urine Not Detected     Amphetamine+Methamphetamine Screen, Ur Not Detected     Cannabinoid Screen, Urine Positive     Opiate Scrn, Ur Not Detected     Barbiturate Screen, Ur Not Detected     Fentanyl Screen, Urine Not Detected    Valproic acid [[id number]]  (Abnormal) Collected: 09/26/24 1930    Order Status: Completed Specimen: Blood, Venous Updated: 09/26/24 2120     Valproic Acid <4.0 ug/mL     Toxicology Screen, serum [[id number]]  (Abnormal) Collected: 09/26/24 1929    Order Status: Completed Specimen: Blood, Venous Updated: 09/26/24 2016     Salicylate <1.5 mg/dL      Acetaminophen 0.4 ug/mL      Ethanol <10 mg/dL     Comprehensive metabolic panel [[id number]]  (Abnormal) Collected: 09/26/24 1929    Order Status: Completed Specimen: Blood, Venous Updated: 09/26/24 2003     Sodium 139 mEQ/L      Potassium 4.3 mEQ/L      Chloride 104 mEQ/L      CO2 28 mEQ/L      BUN 18 mg/dL      Creatinine 1.1 mg/dL      Glucose 119 mg/dL      Calcium 9.6 mg/dL      AST (SGOT) 58 IU/L      ALT (SGPT) 59 IU/L      Alkaline Phosphatase 54 IU/L      Total Protein 7.6 g/dL      Albumin 4.5 g/dL      Bilirubin, Total 0.3 mg/dL      eGFR >60.0 mL/min/1.73m*2      Anion Gap 7 mEQ/L     CBC and differential [[id number]]  (Abnormal) Collected: 09/26/24 1929    Order " Status: Completed Specimen: Blood, Venous Updated: 09/26/24 1939     WBC 8.03 K/uL      RBC 5.27 M/uL      Hemoglobin 14.2 g/dL      Hematocrit 40.3 %      MCV 76.5 fL      MCH 26.9 pg      MCHC 35.2 g/dL      RDW 15.2 %      Platelets 203 K/uL      MPV 10.8 fL      Differential Type Auto     nRBC 0.0 %      Immature Granulocytes 0.2 %      Neutrophils 64.4 %      Lymphocytes 22.4 %      Monocytes 10.3 %      Eosinophils 2.2 %      Basophils 0.5 %      Immature Granulocytes, Absolute 0.02 K/uL      Neutrophils, Absolute 5.16 K/uL      Lymphocytes, Absolute 1.80 K/uL      Monocytes, Absolute 0.83 K/uL      Eosinophils, Absolute 0.18 K/uL      Basophils, Absolute 0.04 K/uL     Extra Tubes [[id number]] Collected: 09/26/24 1930    Order Status: Sent Specimen: Blood, Venous Updated: 09/26/24 1935    Narrative:      The following orders were created for panel order Extra Tubes.  Procedure                               Abnormality         Status                     ---------                               -----------         ------                     Extra Tube Red[[id number]]                                   In process                 Extra Tube Lt Green[[id number]]                              In process                   Please view results for these tests on the individual orders.          Medical History            No past medical history on file.          Surgical History            No past surgical history on file.           Mental Health/Substance Use Treatment - Thu September 26, 2024       Row Name 2323       Previous Mental Health Treatment    Previous Mental Health Treatment inpatient treatment    IP Treatment Provider/Reason [place] [facility]    IP Treatment Compliant yes       Current Mental Health Treatment    Current Mental Health Treatment none       Previous Substance Use Treatment    Previous Substance Use Treatment none       Current Substance Use Treatment    Current Substance Use Treatment none               "     Living Environment - Thu September 26, 2024       Row Name 2323       Living Environment    People in Home alone    Current Living Arrangements apartment    Primary Care Provided by self    Able to Return to Prior Arrangements yes       County Agency Involved    County Agencies Involved? No                  Employment History       Occupation Employer Industry Start End Comments    Self-employed     \"I do a little bit of everything.\"          Family and Education       Marital Status Number of Children    Single 0          Social Identity       Preferred Language Ethnicity Race    English Not , /a, or Bruneian origin Black or               Diagnosis Codes - Thu September 26, 2024       Row Name 2324       Diagnosis    Primary Code 1 F32.3    Primary Code Description 1 Major depressive disorder, single episode, severe with psychotic features    Primary Code 2 F16.10    Primary Code Description 2 Hallucinogen abuse, uncomplicated                   Recommendations/Plan - Th September 26, 2024       Row Name 2325       Recommendations/Plan    Clinical assessment summary [facility] recommends inpatient admission for stabilization, medication management and therapuetic intervention on a voluntary admission.    Recommended level of care Psychiatric, Voluntary (201)    Patient refused treatment recommendation No    Suicide Resource Information Provided yes       Plan/Follow-up    Tobacco Cessation Discharge Follow-up Declined                  Radiology Results (last 24 hours)    No matching results found       ECG Results (last 24 hours)    No matching results found       Microbiology Results       Procedure Component Value Units Date/Time    SARS-COV-2, ANTIGEN Nares [[id number]]  (Normal) Collected: 09/26/24 2143    Specimen: Nasal Swab from Nares Updated: 09/26/24 2302     SARS-COV-2 (COVID-19), Antigen Presumptive Negative, no Ag detected    Narrative:      This test is approved by FDA under " EUA use. The performance of this test is not validated for asymptomatic patients and test results should be correlated with patient's condition.              Home Medications           Taking? Start Date End Date Provider     divalproex (DEPAKOTE) 500 mg 24 hr tablet ()   21  Daniel Howard, DO     Take 2 tablets (1,000 mg total) by mouth nightly.     haloperidoL (HALDOL) 5 mg tablet ()   21  Daniel Howard, DO     Take 1 tablet (5 mg total) by mouth 2 (two) times a day.         Vital Signs (last day)    Date/Time Temp Pulse Resp BP SpO2   24 1921 37.3 (99.1) 69 20 124/63 98     Results (Last 48 Hrs)             SARS-COV-2, ANTIGEN Nares [[id number]] (Normal)    Collected: 24    Updated: 24    Lab Status: Final result    Specimen Type: Nasal Swab    Specimen Source: Nares     SARS-COV-2 (COVID-19), Antigen Presumptive Negative, no Ag detected   Narrative:     This test is approved by FDA under EUA use. The performance of this test is not validated for asymptomatic patients and test results should be correlated with patient's condition.   PCP    COMMUNITY OUTREACH, NO PCP CONFIRMED  Patient Information    Patient Name   Sanchez Wood    Address   [address] [place] [address]    Race   Black or                     Patient Legal Name   Sanchez Wood    Legal Sex   Male    Date of Birth   1980                    Room   ED06    Ethnic Group   Not , /a, or Malay origin    Language   English                    MRN   [id number]    Phone Numbers   Hm: [phone]    PCP   Community Outreach, No Pcp Confirmed                      Emergency Contacts    None on File     Other Contacts    None on File  Documents Filed to Patient    Power of  Living Will Clinical Unknown Study Attachment Consent Form ABN Waiver After Visit Summary Lab Result Scan Code Status [facility] Status  Advance Care Planning    Not on File  Not on File  Not on File  Not on File  Not on File  Filed  Filed  Not on File  Prior  Code  Jump to the Activity      Auth/Cert Information    Hospital account [id number] has no auth/cert information available.     Bed Days    No auth/cert for hospital account [id number]; no bed days information is available.     Admission Information    Current Information    Attending Provider Admitting Provider Admission Type Admission Status   Oren Cook MD  Emergency Confirmed Admission - ED Roomed          Admission Date/Time Discharge Date Hospital Service Auth/Cert Status   24  191  Emergency Medicine Incomplete          Hospital Area Unit Room/Bed    [facility] ED ED06/ED06              Hospital Account    Name Acct ID Class Status Primary Coverage   Sanchez Wood [id number] Emergency Open AETNA - AETNA MEDICARE ADVANTRA          Guarantor Account (for Hospital Account #[id number])    Name Relation to Pt Service Area Active? Acct Type   Sanchez Wood Self [facility] Yes Personal/Family   Address Phone     [address] [place] [address] [phone](H)            Coverage Information (for Hospital Account #[id number])    1. AETNA/AETNA MEDICARE ADVANTRA    F/O Payor/Plan Precert #   AETNA/AETNA MEDICARE ADVANTRA    Subscriber Subscriber #   Sanchez Wood [id number]   Address Phone   [id number]  [place] [address]    2. [facility]    F/O Payor/Plan Precert #   [facility]    Subscriber Subscriber #   Sanchez Wood [id number]   Address Phone   [address]  [place] [address] [phone]

## 2024-09-27 NOTE — ED ATTESTATION NOTE
I have personally seen and examined the patient.   EHR/RN and PA hx reviewed    I reviewed and agree with the PA/NP's assessment and plan of care. Defer to my note for any discrepancies b/t us.     My examination, assessment, and plan of care of Sanchez Wood is as follows:    HPI-  Patient reports feeling sad.  Reports that his daughter committed suicide around this time last year.  Denies suicidal ideation to me.  No somatic complaints.  No pain anywhere.    PE-  G- nad  p-no respiratory distress   Ext- no le assymetry/cords    Data reviewed    201 status;    Pt accepted to psych facility for further evaluation.     Oren Cook MD  09/27/24 0039

## 2024-09-27 NOTE — BEHAVIORAL HEALTH CRISIS PROGRESS NOTE
Behavioral Health Crisis Clinician (BHCC) providing assistance offsite remotely.  Hand off received from onsite overnight BHCC ([facility]). Pt is a recommended for IP Psych placement on a 201 admission. Pt was accepted to [facility] and transport PU ETA is [id number].    BHCC available to support

## 2024-10-22 NOTE — PROGRESS NOTE BEHAVIORAL HEALTH - MOOD
Pt slipped while walking,  fell to R side, has difficulty lifting his R arm, and abrasion to R knee.
Depressed
Normal
Depressed
Normal
Depressed

## 2024-10-30 ENCOUNTER — HOSPITAL ENCOUNTER (EMERGENCY)
Facility: HOSPITAL | Age: 44
Discharge: HOME | End: 2024-10-30
Attending: EMERGENCY MEDICINE
Payer: COMMERCIAL

## 2024-10-30 VITALS
OXYGEN SATURATION: 97 % | BODY MASS INDEX: 32.87 KG/M2 | RESPIRATION RATE: 20 BRPM | DIASTOLIC BLOOD PRESSURE: 76 MMHG | WEIGHT: 248 LBS | TEMPERATURE: 98.3 F | HEIGHT: 73 IN | HEART RATE: 109 BPM | SYSTOLIC BLOOD PRESSURE: 117 MMHG

## 2024-10-30 DIAGNOSIS — F19.10 DRUG ABUSE (CMS/HCC): Primary | ICD-10-CM

## 2024-10-30 LAB
ALBUMIN SERPL-MCNC: 4.4 G/DL (ref 3.5–5.7)
ALP SERPL-CCNC: 57 IU/L (ref 34–125)
ALT SERPL-CCNC: 52 IU/L (ref 7–52)
AMPHET UR QL SCN: NOT DETECTED
ANION GAP SERPL CALC-SCNC: 9 MEQ/L (ref 3–15)
APAP SERPL-MCNC: 1.3 UG/ML (ref 10–30)
AST SERPL-CCNC: 43 IU/L (ref 13–39)
BARBITURATES UR QL SCN: NOT DETECTED
BASOPHILS # BLD: 0.06 K/UL (ref 0.01–0.1)
BASOPHILS NFR BLD: 0.7 %
BENZODIAZ UR QL SCN: NOT DETECTED
BILIRUB SERPL-MCNC: 0.3 MG/DL (ref 0.3–1.2)
BUN SERPL-MCNC: 17 MG/DL (ref 7–25)
CALCIUM SERPL-MCNC: 9.5 MG/DL (ref 8.6–10.3)
CANNABINOIDS UR QL SCN: NOT DETECTED
CHLORIDE SERPL-SCNC: 106 MEQ/L (ref 98–107)
CO2 SERPL-SCNC: 25 MEQ/L (ref 21–31)
COCAINE UR QL SCN: NOT DETECTED
CREAT SERPL-MCNC: 1 MG/DL (ref 0.7–1.3)
DIFFERENTIAL METHOD BLD: ABNORMAL
EGFRCR SERPLBLD CKD-EPI 2021: >60 ML/MIN/1.73M*2
EOSINOPHIL # BLD: 0.22 K/UL (ref 0.04–0.54)
EOSINOPHIL NFR BLD: 2.4 %
ERYTHROCYTE [DISTWIDTH] IN BLOOD BY AUTOMATED COUNT: 14.5 % (ref 11.6–14.4)
ETHANOL SERPL-MCNC: <10 MG/DL
FENTANYL URINE SCR: NOT DETECTED
GLUCOSE SERPL-MCNC: 101 MG/DL (ref 70–99)
HCT VFR BLD AUTO: 40.8 % (ref 40.1–51)
HGB BLD-MCNC: 14.2 G/DL (ref 13.7–17.5)
IMM GRANULOCYTES # BLD AUTO: 0.03 K/UL (ref 0–0.08)
IMM GRANULOCYTES NFR BLD AUTO: 0.3 %
LYMPHOCYTES # BLD: 1.91 K/UL (ref 1.2–3.5)
LYMPHOCYTES NFR BLD: 21.1 %
MCH RBC QN AUTO: 27.2 PG (ref 28–33.2)
MCHC RBC AUTO-ENTMCNC: 34.8 G/DL (ref 32.2–36.5)
MCV RBC AUTO: 78.2 FL (ref 83–98)
MONOCYTES # BLD: 0.75 K/UL (ref 0.3–1)
MONOCYTES NFR BLD: 8.3 %
NEUTROPHILS # BLD: 6.1 K/UL (ref 1.7–7)
NEUTS SEG NFR BLD: 67.2 %
NRBC BLD-RTO: 0 %
OPIATES UR QL SCN: NOT DETECTED
PCP UR QL SCN: NOT DETECTED
PLATELET # BLD AUTO: 234 K/UL (ref 150–350)
PMV BLD AUTO: 12.3 FL (ref 9.4–12.4)
POTASSIUM SERPL-SCNC: 4.3 MEQ/L (ref 3.5–5.1)
PROT SERPL-MCNC: 7.6 G/DL (ref 6–8.2)
RBC # BLD AUTO: 5.22 M/UL (ref 4.5–5.8)
SALICYLATES SERPL-MCNC: <1.5 MG/DL
SODIUM SERPL-SCNC: 140 MEQ/L (ref 136–145)
WBC # BLD AUTO: 9.07 K/UL (ref 3.8–10.5)

## 2024-10-30 PROCEDURE — 93005 ELECTROCARDIOGRAM TRACING: CPT | Performed by: EMERGENCY MEDICINE

## 2024-10-30 PROCEDURE — 85025 COMPLETE CBC W/AUTO DIFF WBC: CPT | Performed by: EMERGENCY MEDICINE

## 2024-10-30 PROCEDURE — 93005 ELECTROCARDIOGRAM TRACING: CPT

## 2024-10-30 PROCEDURE — 36415 COLL VENOUS BLD VENIPUNCTURE: CPT | Performed by: EMERGENCY MEDICINE

## 2024-10-30 PROCEDURE — 80053 COMPREHEN METABOLIC PANEL: CPT | Performed by: EMERGENCY MEDICINE

## 2024-10-30 PROCEDURE — 99285 EMERGENCY DEPT VISIT HI MDM: CPT

## 2024-10-30 PROCEDURE — G0480 DRUG TEST DEF 1-7 CLASSES: HCPCS | Performed by: EMERGENCY MEDICINE

## 2024-10-30 PROCEDURE — 80307 DRUG TEST PRSMV CHEM ANLYZR: CPT | Performed by: EMERGENCY MEDICINE

## 2024-10-30 PROCEDURE — 87426 SARSCOV CORONAVIRUS AG IA: CPT | Performed by: EMERGENCY MEDICINE

## 2024-10-30 NOTE — ED PROVIDER NOTES
"Emergency Medicine Note  HPI   HISTORY OF PRESENT ILLNESS     HPI patient is a 44-year-old man with history of drug abuse, suicidal ideation, inpatient psychiatric care who presents to the ED for evaluation of suicidal ideation today.  He says he also hears voices telling him to abuse drugs.  He uses cocaine, marijuana, PCP.  He has no specific plan for suicide, but feels he would overdose on drugs.  He is voluntary for inpatient psychiatric care.  He is without other acute symptoms in the ED today.  No chest pain, shortness of breath, fevers, chills.      Patient History   PAST HISTORY     Reviewed from Nursing Triage:       No past medical history on file.    No past surgical history on file.    No family history on file.    Social History     Substance Use Topics    Alcohol use: Yes     Comment: Pt reported he drank a \"2.5% 5 hours ago.\"         Review of Systems   REVIEW OF SYSTEMS     Review of Systems      VITALS     ED Vitals      Date/Time Temp Pulse Resp BP SpO2 [facility]   10/30/24 1802 36.8 °C (98.3 °F) 109 20 117/76 97 % AF                         Physical Exam   PHYSICAL EXAM     Physical Exam  Vitals and nursing note reviewed.   Constitutional:       General: He is not in acute distress.     Appearance: He is not ill-appearing or diaphoretic.   HENT:      Head: Normocephalic and atraumatic.      Right Ear: External ear normal.      Left Ear: External ear normal.      Nose: Nose normal.      Mouth/Throat:      Mouth: Mucous membranes are moist.   Eyes:      Extraocular Movements: Extraocular movements intact.   Cardiovascular:      Rate and Rhythm: Regular rhythm. Tachycardia present.      Pulses: Normal pulses.      Heart sounds: Normal heart sounds.   Pulmonary:      Effort: Pulmonary effort is normal. No respiratory distress.      Breath sounds: Normal breath sounds.   Abdominal:      General: There is no distension.      Tenderness: There is no abdominal tenderness. There is no guarding. "   Musculoskeletal:         General: Normal range of motion.      Cervical back: Normal range of motion.      Right lower leg: No edema.      Left lower leg: No edema.   Skin:     General: Skin is warm and dry.      Coloration: Skin is not jaundiced or pale.   Neurological:      General: No focal deficit present.      Mental Status: He is alert.   Psychiatric:         Mood and Affect: Mood normal.         Speech: Speech normal.         Behavior: Behavior is cooperative.         Thought Content: Thought content includes suicidal ideation. Thought content does not include homicidal plan.           PROCEDURES     Procedures     DATA     Results       Procedure Component Value Units Date/Time    Drug screen panel, urine [[id number]]  (Normal) Collected: 10/30/24 2059    Specimen: Urine, Clean Catch Updated: 10/30/24 2133     PCP Scrn, Ur Not Detected     Comment: Assay Detects: phencyclidine in urine. Lowest detectable concentration is 25 ng/mL of phencyclidine.        Benzodiazepine Ur Qual Not Detected     Comment: Assay Detects: benzodiazepines and metabolites at varying concentrations. Lowest detectable concentration is 200 ng/mL of oxazepam.        Cocaine Screen, Urine Not Detected     Comment: Assay Detects: benzoylecgonine and cocaine in urine. Lowest detectable concentration is 300 ng/mL of benzoylecgonine.        Amphetamine+Methamphetamine Screen, Ur Not Detected     Comment: Assay Detects: d-methamphetamine, d-amphetamine, methlyenedioxyamphetamine (MDA), and methlyenendioxymethamphetamine (MDMA) in urine. Lowest detectable concentration is 1000 ng/mL of d-methamphetamine.<br>Assay is less sensitive to MDA and MDMA (lowest detectable concentration, 2500 ng/mL) and could produce a false negative result. If MDMA overdose is suspected and the result is negative, a more specific test should be requested.        Cannabinoid Screen, Urine Not Detected     Comment: Assay Detects: cannabinoid metabolites in urine.  Lowest detectable concentration is 50 ng/mL        Opiate Scrn, Ur Not Detected     Comment: Assay Detects: codeine, dihydrocodeine, hydrocodone, hydromorphone, levorphanol, morphine, morphine-3-glucuronide, norcodeine, oxycodone in urine. Lowest detectable concentration is 300 ng/mL of morphine.        Barbiturate Screen, Ur Not Detected     Comment: Assay Detects: alphenal, amobarbital, aprobarbital, barbital, butabarbital, butalbital, butethal, diallybarbital, pentobarbital, secobarbital,talbutal, and thiopental. Lowest detectable concentration is 200 ng/mL of secobarbital.        Fentanyl Screen, Urine Not Detected     Comment: Assay Detects: fentanyl metabolite in urine, lowest detectable concentration is 5 ng/mL of norfentanyl.       SARS-COV-2, ANTIGEN Nares [[id number]] Collected: 10/30/24 2047    Specimen: Nasal Swab from Nares Updated: 10/30/24 2106    Toxicology Screen, serum [[id number]]  (Abnormal) Collected: 10/30/24 1815    Specimen: Blood, Venous Updated: 10/30/24 2018     Salicylate <1.5 mg/dL      Acetaminophen 1.3 ug/mL      Ethanol <10 mg/dL     Comprehensive metabolic panel [[id number]]  (Abnormal) Collected: 10/30/24 1815    Specimen: Blood, Venous Updated: 10/30/24 2017     Sodium 140 mEQ/L      Potassium 4.3 mEQ/L      Comment: Results obtained on plasma. Plasma Potassium values may be up to 0.4 mEQ/L less than serum values. The differences may be greater for patients with high platelet or white cell counts.        Chloride 106 mEQ/L      CO2 25 mEQ/L      BUN 17 mg/dL      Creatinine 1.0 mg/dL      Glucose 101 mg/dL      Calcium 9.5 mg/dL      AST (SGOT) 43 IU/L      ALT (SGPT) 52 IU/L      Alkaline Phosphatase 57 IU/L      Total Protein 7.6 g/dL      Comment: Test performed on plasma which typically contains approximately 0.4 g/dL more protein than serum.        Albumin 4.4 g/dL      Bilirubin, Total 0.3 mg/dL      eGFR >60.0 mL/min/1.73m*2      Comment: Calculation based on the Chronic Kidney  Disease Epidemiology Collaboration (CKD-EPI) equation refit without adjustment for race.        Anion Gap 9 mEQ/L     CBC and differential [[id number]]  (Abnormal) Collected: 10/30/24 1815    Specimen: Blood, Venous Updated: 10/30/24 1953     WBC 9.07 K/uL      RBC 5.22 M/uL      Hemoglobin 14.2 g/dL      Hematocrit 40.8 %      MCV 78.2 fL      MCH 27.2 pg      MCHC 34.8 g/dL      RDW 14.5 %      Platelets 234 K/uL      MPV 12.3 fL      Differential Type Auto     nRBC 0.0 %      Immature Granulocytes 0.3 %      Neutrophils 67.2 %      Lymphocytes 21.1 %      Monocytes 8.3 %      Eosinophils 2.4 %      Basophils 0.7 %      Immature Granulocytes, Absolute 0.03 K/uL      Neutrophils, Absolute 6.10 K/uL      Lymphocytes, Absolute 1.91 K/uL      Monocytes, Absolute 0.75 K/uL      Eosinophils, Absolute 0.22 K/uL      Basophils, Absolute 0.06 K/uL     RAINBOW GOLD [[id number]] Collected: 10/30/24 1815    Specimen: Blood, Venous Updated: 10/30/24 1948    [facility] Draw Panel [[id number]] Collected: 10/30/24 1815    Specimen: Blood, Venous Updated: 10/30/24 1948    Narrative:      The following orders were created for panel order [facility] Draw Panel.  Procedure                               Abnormality         Status                     ---------                               -----------         ------                     RAINBOW RED[[id number]]                                      In process                 RAINBOW GOLD[[id number]]                                     In process                   Please view results for these tests on the individual orders.    RAINBOW RED [[id number]] Collected: 10/30/24 1815    Specimen: Blood, Venous Updated: 10/30/24 1948            Imaging Results    None         ECG 12 lead    (Results Pending)       Scoring tools                                  ED Course & MDM   MDM / ED COURSE / CLINICAL IMPRESSION / DISPO     Medical Decision Making      ED Course as of 10/30/24 2221   Wed Oct 30, 2024    183 EK, sinus tach, normal axis, T wave inversion inferiorly and laterally-new from 3 years ago. [TP]   184 Patient is medically cleared for crisis [TP]    I spoke with amy, Ewa, who says patient will be evaluated by her replacement tonight. [TP]    Tried to reach out to [facility] again for an update, but unable to reach them.  Patient is stable. [TP]    [facility] is in the ED evaluating patient at this time but needs results of UDS and covid screen [TP]    Patient seen in the ED by [facility] who says we can cancel one-to-one as patient is not suicidal, but seeking rehab for drug abuse. [TP]    Patient is speaking with crisis regarding treatment for rehab and drug abuse.  He got upset when further questioning regarding prior rehab placements, and no longer wanted assistance in finding a location.  He would like to be discharged at this time.  He was cleared by crisis and medically cleared in the ED. [TP]      ED Course User Index  [TP] Nathalia Hodgson MD     Clinical Impression      Drug abuse (CMS/[facility])     _________________       ED Disposition   Discharge                       Nathalia Hodgson MD  10/30/24 2229

## 2024-10-31 LAB — SARS-COV-2 AG RESP QL IA.RAPID: NORMAL

## 2024-10-31 NOTE — BEHAVIORAL HEALTH CRISIS PROGRESS NOTE
"10/30/2024 Pt is a 44 year old male. Pt presents to the ED requesting a psych evaluation. Pt requested to go to [facility] or [facility]. Pt denied ever receiving treatment those facilities. Pt initally mention passive SI to doctor. Pt denied SI [facility] to the nurse and then to [facility]. Pt shared that he is here seeking substance use treatment and is not suicidal. Pt has a history of K2 and marijuana use daily (3 blunts daily) .Pt drinks a shot or two a day. Pt started drinking alcohol at 10 y.o. Pt used PCP 3 days ago ( 1st use 12 y.o.) Pt says that he drinks and smokes it daily. Pt uses Cocaine (1st use 10 y.o.) Last use this afternoon and uses every 3 days.Pt says his whole family has addiction issues. Pt said that he had 2 years sober relapse at a celebration.Pt has a history of Schizophrenia and say he is taking his medication.    Pt denied any housing issues and gave [facility] his address.   Pt is currently living in room and can't go back because he did not pay rent. Pt says that there was some  \"drug activity\" in the house and did not want to live there. Pt says he completed his GED. Pt is unemployed and receives disability.       [facility] called [facility] about treatment history.  Pt was discharged from treatment October 2, 2024 from [facility]. Pt was also at [facility] in September. When asked about those admissions, pt became agitated. Pt was not forthcoming about his suicidal ideation, treatment history, or living situation.     Pt asked to leave when [facility] started asking clarifying questions. Pt became agitated and asked to leave.     [facility] and hospital staff do not have any safety concerns. Pt has a history of malingering. Pt UDS came back negative.    Security had to be called because the pt was becoming verbally aggressive.       "

## 2024-10-31 NOTE — DISCHARGE INSTRUCTIONS
Please follow-up with your primary doctor for drug rehab.  Please return to the emergency room for any worsening symptoms.

## 2024-11-01 LAB
ATRIAL RATE: 104
P AXIS: 54
PR INTERVAL: 128
QRS DURATION: 86
QT INTERVAL: 336
QTC CALCULATION(BAZETT): 441
R AXIS: 19
T WAVE AXIS: -38
VENTRICULAR RATE: 104

## 2024-11-01 PROCEDURE — 93010 ELECTROCARDIOGRAM REPORT: CPT | Performed by: INTERNAL MEDICINE

## 2024-11-25 NOTE — PROGRESS NOTE BEHAVIORAL HEALTH - NS ED BHA MED ROS MUSCULOSKELETAL
Notify provider if symptoms worsen.   
No complaints

## 2025-06-17 NOTE — BH INPATIENT PSYCHIATRY DISCHARGE NOTE - LEVEL OF CONSCIOUSNESS
Annual wellness exam completed today. Health Maintenance including immunizations was updated and reflected in the chart. Yearly screening labs were ordered.     Further recommendations to be given once lab data received.     Health advice: healthy food choices with fresh fruits and vegetables, maintain sleep pattern at least 8 hours, avoid texting and distracted driving practices; wear safety belt, engage in regular exercise, maintain healthy weight, use safe sex practices, avoid alcohol and illicit drugs. Maintain immunizations that are up to date. Maintain health maintenance:PAP Smear, etc.  Follow up with PCP if struggling with depression or anxiety. Keep regular dental and eye exams. Brush and floss teeth daily.     I suggest they take a daily multivitamin that is age-appropriate (example women's One-A-Day.)  Patient voiced understanding of these instructions.     Follow up Annually for Physical     
Alert

## 2025-06-19 ENCOUNTER — INPATIENT (INPATIENT)
Facility: HOSPITAL | Age: 45
LOS: 10 days | Discharge: HOME | DRG: 885 | End: 2025-06-30
Attending: PSYCHIATRY & NEUROLOGY | Admitting: PSYCHIATRY & NEUROLOGY
Payer: SELF-PAY

## 2025-06-19 VITALS
WEIGHT: 261.91 LBS | DIASTOLIC BLOOD PRESSURE: 84 MMHG | TEMPERATURE: 98 F | RESPIRATION RATE: 16 BRPM | OXYGEN SATURATION: 97 % | SYSTOLIC BLOOD PRESSURE: 133 MMHG | HEART RATE: 92 BPM

## 2025-06-19 DIAGNOSIS — F14.11 COCAINE ABUSE, IN REMISSION: ICD-10-CM

## 2025-06-19 DIAGNOSIS — F12.10 CANNABIS ABUSE, UNCOMPLICATED: ICD-10-CM

## 2025-06-19 DIAGNOSIS — F81.9 DEVELOPMENTAL DISORDER OF SCHOLASTIC SKILLS, UNSPECIFIED: ICD-10-CM

## 2025-06-19 DIAGNOSIS — F29 UNSPECIFIED PSYCHOSIS NOT DUE TO A SUBSTANCE OR KNOWN PHYSIOLOGICAL CONDITION: ICD-10-CM

## 2025-06-19 LAB
A1C WITH ESTIMATED AVERAGE GLUCOSE RESULT: 6.1 % — HIGH (ref 4–5.6)
ANION GAP SERPL CALC-SCNC: 11 MMOL/L — SIGNIFICANT CHANGE UP (ref 5–17)
APAP SERPL-MCNC: <5 UG/ML — LOW (ref 10–30)
APPEARANCE UR: CLEAR — SIGNIFICANT CHANGE UP
BASOPHILS # BLD AUTO: 0.04 K/UL — SIGNIFICANT CHANGE UP (ref 0–0.2)
BASOPHILS NFR BLD AUTO: 0.6 % — SIGNIFICANT CHANGE UP (ref 0–2)
BILIRUB UR-MCNC: NEGATIVE — SIGNIFICANT CHANGE UP
BUN SERPL-MCNC: 17 MG/DL — SIGNIFICANT CHANGE UP (ref 7–23)
CALCIUM SERPL-MCNC: 9.3 MG/DL — SIGNIFICANT CHANGE UP (ref 8.4–10.5)
CHLORIDE SERPL-SCNC: 102 MMOL/L — SIGNIFICANT CHANGE UP (ref 96–108)
CO2 SERPL-SCNC: 24 MMOL/L — SIGNIFICANT CHANGE UP (ref 22–31)
COLOR SPEC: YELLOW — SIGNIFICANT CHANGE UP
CREAT SERPL-MCNC: 0.86 MG/DL — SIGNIFICANT CHANGE UP (ref 0.5–1.3)
DIFF PNL FLD: NEGATIVE — SIGNIFICANT CHANGE UP
EGFR: 107 ML/MIN/1.73M2 — SIGNIFICANT CHANGE UP
EGFR: 107 ML/MIN/1.73M2 — SIGNIFICANT CHANGE UP
EOSINOPHIL # BLD AUTO: 0.23 K/UL — SIGNIFICANT CHANGE UP (ref 0–0.5)
EOSINOPHIL NFR BLD AUTO: 3.4 % — SIGNIFICANT CHANGE UP (ref 0–6)
ESTIMATED AVERAGE GLUCOSE: 128 MG/DL — HIGH (ref 68–114)
ETHANOL SERPL-MCNC: <10 MG/DL — SIGNIFICANT CHANGE UP (ref 0–10)
GLUCOSE SERPL-MCNC: 146 MG/DL — HIGH (ref 70–99)
GLUCOSE UR QL: NEGATIVE MG/DL — SIGNIFICANT CHANGE UP
HCT VFR BLD CALC: 42.4 % — SIGNIFICANT CHANGE UP (ref 39–50)
HGB BLD-MCNC: 14.8 G/DL — SIGNIFICANT CHANGE UP (ref 13–17)
IMM GRANULOCYTES # BLD AUTO: 0.02 K/UL — SIGNIFICANT CHANGE UP (ref 0–0.07)
IMM GRANULOCYTES NFR BLD AUTO: 0.3 % — SIGNIFICANT CHANGE UP (ref 0–0.9)
KETONES UR QL: NEGATIVE MG/DL — SIGNIFICANT CHANGE UP
LEUKOCYTE ESTERASE UR-ACNC: ABNORMAL
LYMPHOCYTES # BLD AUTO: 1.99 K/UL — SIGNIFICANT CHANGE UP (ref 1–3.3)
LYMPHOCYTES NFR BLD AUTO: 29.7 % — SIGNIFICANT CHANGE UP (ref 13–44)
MCHC RBC-ENTMCNC: 27 PG — SIGNIFICANT CHANGE UP (ref 27–34)
MCHC RBC-ENTMCNC: 34.9 G/DL — SIGNIFICANT CHANGE UP (ref 32–36)
MCV RBC AUTO: 77.2 FL — LOW (ref 80–100)
MONOCYTES # BLD AUTO: 0.53 K/UL — SIGNIFICANT CHANGE UP (ref 0–0.9)
MONOCYTES NFR BLD AUTO: 7.9 % — SIGNIFICANT CHANGE UP (ref 2–14)
NEUTROPHILS # BLD AUTO: 3.89 K/UL — SIGNIFICANT CHANGE UP (ref 1.8–7.4)
NEUTROPHILS NFR BLD AUTO: 58.1 % — SIGNIFICANT CHANGE UP (ref 43–77)
NITRITE UR-MCNC: NEGATIVE — SIGNIFICANT CHANGE UP
NRBC # BLD AUTO: 0 K/UL — SIGNIFICANT CHANGE UP (ref 0–0)
NRBC # FLD: 0 K/UL — SIGNIFICANT CHANGE UP (ref 0–0)
NRBC BLD AUTO-RTO: 0 /100 WBCS — SIGNIFICANT CHANGE UP (ref 0–0)
PCP SPEC-MCNC: SIGNIFICANT CHANGE UP
PH UR: 5.5 — SIGNIFICANT CHANGE UP (ref 5–8)
PLATELET # BLD AUTO: 241 K/UL — SIGNIFICANT CHANGE UP (ref 150–400)
PMV BLD: 10.9 FL — SIGNIFICANT CHANGE UP (ref 7–13)
POTASSIUM SERPL-MCNC: 3.8 MMOL/L — SIGNIFICANT CHANGE UP (ref 3.5–5.3)
POTASSIUM SERPL-SCNC: 3.8 MMOL/L — SIGNIFICANT CHANGE UP (ref 3.5–5.3)
PROT UR-MCNC: NEGATIVE MG/DL — SIGNIFICANT CHANGE UP
RBC # BLD: 5.49 M/UL — SIGNIFICANT CHANGE UP (ref 4.2–5.8)
RBC # FLD: 14.8 % — HIGH (ref 10.3–14.5)
SALICYLATES SERPL-MCNC: <0.3 MG/DL — LOW (ref 2.8–20)
SARS-COV-2 RNA SPEC QL NAA+PROBE: SIGNIFICANT CHANGE UP
SODIUM SERPL-SCNC: 137 MMOL/L — SIGNIFICANT CHANGE UP (ref 135–145)
SP GR SPEC: 1.02 — SIGNIFICANT CHANGE UP (ref 1–1.03)
TSH SERPL-MCNC: 1.93 UIU/ML — SIGNIFICANT CHANGE UP (ref 0.27–4.2)
UROBILINOGEN FLD QL: 0.2 MG/DL — SIGNIFICANT CHANGE UP (ref 0.2–1)
WBC # BLD: 6.7 K/UL — SIGNIFICANT CHANGE UP (ref 3.8–10.5)
WBC # FLD AUTO: 6.7 K/UL — SIGNIFICANT CHANGE UP (ref 3.8–10.5)

## 2025-06-19 PROCEDURE — 93010 ELECTROCARDIOGRAM REPORT: CPT

## 2025-06-19 PROCEDURE — 99285 EMERGENCY DEPT VISIT HI MDM: CPT

## 2025-06-19 PROCEDURE — 90792 PSYCH DIAG EVAL W/MED SRVCS: CPT

## 2025-06-19 RX ORDER — IBUPROFEN 200 MG
400 TABLET ORAL EVERY 6 HOURS
Refills: 0 | Status: DISCONTINUED | OUTPATIENT
Start: 2025-06-19 | End: 2025-06-30

## 2025-06-19 RX ORDER — FLUPHENAZINE HCL 10 MG
5 TABLET ORAL EVERY 6 HOURS
Refills: 0 | Status: DISCONTINUED | OUTPATIENT
Start: 2025-06-19 | End: 2025-06-30

## 2025-06-19 RX ORDER — MAGNESIUM HYDROXIDE 400 MG/5ML
30 SUSPENSION ORAL DAILY
Refills: 0 | Status: DISCONTINUED | OUTPATIENT
Start: 2025-06-19 | End: 2025-06-30

## 2025-06-19 RX ORDER — TRAZODONE HCL 100 MG
50 TABLET ORAL AT BEDTIME
Refills: 0 | Status: DISCONTINUED | OUTPATIENT
Start: 2025-06-19 | End: 2025-06-30

## 2025-06-19 RX ORDER — ACETAMINOPHEN 500 MG/5ML
650 LIQUID (ML) ORAL ONCE
Refills: 0 | Status: COMPLETED | OUTPATIENT
Start: 2025-06-19 | End: 2025-06-19

## 2025-06-19 RX ORDER — NICOTINE POLACRILEX 4 MG/1
1 GUM, CHEWING ORAL DAILY
Refills: 0 | Status: DISCONTINUED | OUTPATIENT
Start: 2025-06-19 | End: 2025-06-30

## 2025-06-19 RX ORDER — FLUPHENAZINE HCL 10 MG
5 TABLET ORAL AT BEDTIME
Refills: 0 | Status: DISCONTINUED | OUTPATIENT
Start: 2025-06-19 | End: 2025-06-21

## 2025-06-19 RX ORDER — MAGNESIUM, ALUMINUM HYDROXIDE 200-200 MG
30 TABLET,CHEWABLE ORAL EVERY 4 HOURS
Refills: 0 | Status: DISCONTINUED | OUTPATIENT
Start: 2025-06-19 | End: 2025-06-30

## 2025-06-19 RX ORDER — LORAZEPAM 4 MG/ML
2 VIAL (ML) INJECTION EVERY 6 HOURS
Refills: 0 | Status: DISCONTINUED | OUTPATIENT
Start: 2025-06-19 | End: 2025-06-20

## 2025-06-19 RX ORDER — NICOTINE POLACRILEX 4 MG/1
2 GUM, CHEWING ORAL
Refills: 0 | Status: DISCONTINUED | OUTPATIENT
Start: 2025-06-19 | End: 2025-06-30

## 2025-06-19 RX ADMIN — Medication 5 MILLIGRAM(S): at 21:52

## 2025-06-19 RX ADMIN — Medication 50 MILLIGRAM(S): at 21:52

## 2025-06-19 RX ADMIN — Medication 400 MILLIGRAM(S): at 16:40

## 2025-06-19 RX ADMIN — Medication 650 MILLIGRAM(S): at 08:59

## 2025-06-19 RX ADMIN — Medication 400 MILLIGRAM(S): at 15:52

## 2025-06-19 NOTE — ED BEHAVIORAL HEALTH ASSESSMENT NOTE - NSBHMSEPERCEPT_PSY_A_CORE
c/o seeing figures and hearing screeching and screaming sounds/Auditory hallucinations/Visual hallucinations

## 2025-06-19 NOTE — ED BEHAVIORAL HEALTH ASSESSMENT NOTE - RISK ASSESSMENT
moderate acute suicide risk, low acute violence risk  Static risk factors include male gender, h/o psychiatric dx, h/o SI and SA, h/o substance use  Modifiable risk factors include recent worsening mood sx and SI, lack of connection to psychiatric care  Protective factors include future oriented, help seeking, family support

## 2025-06-19 NOTE — ED BEHAVIORAL HEALTH ASSESSMENT NOTE - SUBSTANCE ISSUES AND PLAN (INCLUDE STANDING AND PRN MEDICATION)
counseling about cannabis use, clarification of other substnace use (note utox +PCP - ?false +), nicotine replacement counseling about cannabis use, clarification of other substance use (note utox +PCP - ?false +), nicotine replacement

## 2025-06-19 NOTE — BH PATIENT PROFILE - HOME MEDICATIONS
fluPHENAZine 5 mg oral tablet , 1 tab(s) orally once a day (at bedtime)  fluPHENAZine 5 mg oral tablet , 1 tab(s) orally once a day (at bedtime)

## 2025-06-19 NOTE — ED BEHAVIORAL HEALTH ASSESSMENT NOTE - OTHER PAST PSYCHIATRIC HISTORY (INCLUDE DETAILS REGARDING ONSET, COURSE OF ILLNESS, INPATIENT/OUTPATIENT TREATMENT)
Reports diagnosis of learning disabilities since childhood and ?of schizophrenia. Denies any past OP psychiatric care and reports only psychiatric admission was to [facility] in 2023, though may be unreliable historian. Reports diagnosis of learning disabilities since childhood and ?of schizophrenia. Denies any past OP psychiatric care and reports only psychiatric admission was to [facility] in 2023, though chart indicates multiple past admissions including also to [facility] in 2020. Reports diagnosis of learning disabilities since childhood and ?of schizophrenia. Denies any past OP psychiatric care and reports only psychiatric admission was to [facility] in 2023, though chart indicates multiple past admissions including [facility] in 2023, [facility] in 2020.

## 2025-06-19 NOTE — BH PATIENT PROFILE - NSPROEDALEARNPREF_GEN_A_NUR
Please let her know that UA looks like infection, bactrim order pended   (E4) spontaneous verbal instruction

## 2025-06-19 NOTE — BH PATIENT PROFILE - SURGICAL SITE INCISION
"Please see \"Imaging\" tab under \"Chart Review\" for details of today's visit.    Tiffany Montiel    "
no

## 2025-06-19 NOTE — ED PROVIDER NOTE - CLINICAL SUMMARY MEDICAL DECISION MAKING FREE TEXT BOX
44 yo M with pmh of schizoaffective disorder, anxiety, depression c/o needing psychiatric admission. Pt states he has been hearing voices telling him to hurt himself and seeing things that arent there. Denies SI or plan but this morning he was banging his head against the wall in an attempt to clear his head.. Pt reports occasional marijuana use. Denies etoh. Admits to mild HA. Denies LOC, dizziness, n/v, neck pain, cp, sob, abd pain, n/v/d. labs, psyc eval

## 2025-06-19 NOTE — ED BEHAVIORAL HEALTH ASSESSMENT NOTE - HPI (INCLUDE ILLNESS QUALITY, SEVERITY, DURATION, TIMING, CONTEXT, MODIFYING FACTORS, ASSOCIATED SIGNS AND SYMPTOMS)
44yo man, self-reportedly , domiciled alone in [place], receiving SSD for "learning disabilities", with documented PPH of schizophrenia v schizoaffective d/o, cannabis use, cocaine use, alcohol use, PMH of cataracts, at least two past reported psychiatric admissions to [facility] (8/,  for psychosis and SI), not engaged in OP treatment, +history of incarceration for unknown reason from ages [age], who presents BIBS with request of psychiatric admission due to distressing mood sx, AH and vague SI.     On interview, pt found asleep, arousable to voice, calm, fully oriented. Pt reports that he traveled from [place] to [place] somewhere between "26 and 36" hours ago in order to seek psychiatric admission due to worsening depression, insomnia with fatigue (cannot quantify sleep - states "my eyes get red"), low appetite, AH of "screaming", VH of figures, and distressing intrusive thoughts about himself, such shame that he is unemployed and needs to get a job, that he reports cause him to consider suicide, though denies intent/plan/recent attempts. Pt reports that he has been banging his head on the wall to lessen to intrusive thoughts; he denies that this is an attempt to harm himself or end his life. Pt does not describe anhedonia, hopelessness, helplessness; denies reckless or impulsive behaviors, mood lability, or elevated energy; does not describe paranoia or delusional thinking. Pt states that he traveled to [facility] because of his satisfaction with past treatment here after his daughter  by suicide - he does not recall the medication used (Prolixin per chart) but states that nothing else has ever helped him. Pt denies any adherence with medication after leaving the hospital in  and denies ever establishing OP psychiatric care. Pt states that he attempted to go to [facility] overnight but accidentally went to [facility] after asking multiple people for directions -states he was evaluated by psychiatry and cleared for dc after he also requested discharge. Pt requests voluntary admission. He provides contact information for his sister Linda Ortiz [phone] - reports she was in support of him coming to the ED.    Per [facility] documentation, pt admitted 8/ for depression, AH, and SI after report of daughter dying by suicide 4 days prior, other stressors including homelessness, recent cocaine use. Pt dx with schizophrenia, treated with Prolixin 5mg QHs, noted rapid improvement and quickly requested dc.       No record found in [facility] (may be medicare recipient). Prior [facility] documentation reviewed.

## 2025-06-19 NOTE — ED BEHAVIORAL HEALTH ASSESSMENT NOTE - PAST PSYCHOTROPIC MEDICATION
Prolixin 5mg QHS while on [facility] in 8/2023 Prolixin 5mg QHS while on [facility] in 8/2023  Chart mentions past treatment with Zyprexa, Lithium

## 2025-06-19 NOTE — ED BEHAVIORAL HEALTH ASSESSMENT NOTE - SUICIDAL BEHAVIOR DETAILS
banging head to stop distressing thoughts, consideration of suicide though no plan or recent attempts

## 2025-06-19 NOTE — ED BEHAVIORAL HEALTH ASSESSMENT NOTE - DETAILS
t[facility] attending Reports history of SI and NSSIB, past ?SA by cutting over 10 years ago self c/o headache and dizziness daughter with unkonwn psychiatric illness,  by suicide at age 20 daughter with unknown psychiatric illness,  by suicide at age 20

## 2025-06-19 NOTE — ED PROVIDER NOTE - ATTENDING APP SHARED VISIT CONTRIBUTION OF CARE
45 M hx schizoaffective p/w AH telling to hurt self, VH and banging head against wall to get voices out of head.  VSS.  Mild headache, no n/v.  No signs of head injury.  Patient is alert, oriented x person, place and time.  Cranial nerves 2-12 are intact.  Normal gait and speech.  Cerebellar testing normal:  negative Romberg, normal coordination and normal finger to nose, heal to shin and rapid alternating movements.  Normal proprioception and sensory exam.  No pronator drift.  5/5 bl upper extremity and lower extremity strength.  Flat affect.  Do not suspect ICH.  Plan psych clearance labs, consult psych and reassess.

## 2025-06-19 NOTE — ED BEHAVIORAL HEALTH ASSESSMENT NOTE - DIFFERENTIAL
unspecified mood and psychotic d/o, schizophrenia or schizoaffective d/o by history, also consider mdd with psychosis, substance induced  learning disabilities by history, likely intellectual disability unspecified mood and psychotic d/o, schizophrenia or schizoaffective d/o by history, also consider mdd with psychosis, substance induced  learning disabilities by history, likely intellectual disability  cannabis abuse r/o use disorder  cocaine abuse by history  alcohol abuse by history  r/o PCP abuse

## 2025-06-19 NOTE — ED BEHAVIORAL HEALTH ASSESSMENT NOTE - DESCRIPTION
Pt BIBS, calm and cooperative. Per ED note "46 yo M with pmh of schizoaffective disorder, anxiety, depression c/o needing psychiatric admission. Pt states he has been hearing voices telling him to hurt himself and seeing things that arent there. Denies SI or plan but this morning he was banging his head against the wall in an attempt to clear his head.  Prior suicide attempt in 2012 from cutting. Last psyc admission was 2 years ago. Pt reports travelling from [place] yesterday to be admitted to [facility] where he went yesterday but they did not admit him. Pt takes medications but cannot recal the name but ran out 3 days ago. Pt reports occasional marijuana use. Denies etoh. Admits to mild HA. Denies LOC, dizziness, n/v, neck pain, cp, sob, abd pain, n/v/d."    Labs in ED significant for Utox +THC and PCP  x2 years after wife and daughter (age 22)  by ?suicide, lives alone in [place], receives SSD. Close with sister Linda reports dx of cataracts reports dx of cataracts. prior chart mentions past dx of lymphadenopathy  x2 years after wife and daughter (age 22)  by ?suicide (per pt report today), lives alone in [place], receives SSD. Close with sister Linda

## 2025-06-19 NOTE — ED ADULT TRIAGE NOTE - CHIEF COMPLAINT QUOTE
Pt presents to ED c/o SI started this AM, states woke up feeling suicidal, but has no plans.  Pt calm & cooperative at triage

## 2025-06-19 NOTE — BH PATIENT PROFILE - NSSBIRTALCACTION/INTER_GEN_A_CORE
Alert-The patient is alert, awake and responds to voice. The patient is oriented to time, place, and person. The triage nurse is able to obtain subjective information. Positive reinforcement

## 2025-06-19 NOTE — ED BEHAVIORAL HEALTH ASSESSMENT NOTE - SUMMARY
44yo man, self-reportedly , domiciled alone in [place], receiving SSD for "learning disabilities", with documented PPH of schizophrenia v schizoaffective d/o, cannabis use, cocaine use, alcohol use, PMH of cataracts, at least three past psychiatric admissions including twice to [facility] (8/15-8/17/2023, 2020 for psychosis and SI), not engaged in OP treatment, +history of incarceration for unknown reason from ages [age], who presents with distressing depressive and psychotic symptoms in setting of reported chronic nonadherence with psychiatric care, seeking admission.     Pt presents with depressive sx including low mood, impaired sleep and appetite, intrusive thoughts, and consideration of suicide without intent or plan, as well as vague AH of "screaming" and VH of figures, no other described sx of psychosis. Pt has been engaging in head-banging behaviors in order to reduce distressing thoughts, and is seeking IPP for stabilization on medication. Pt's TC is notably simple, concrete, vague, and at times with illogical and inconsistent TP; given reported h/o learning disabilities, wonder about possible intellectual impairment contributing to impaired coping. Secondary gain is also possible, noting past homelessness, travel from [place], and evaluation at [facility] earlier today, though pt does appear motivated specifically for treatment at [facility]. Substance induced sx are also possible, with pt minimization of past substance use on interview and utox +THC and PCP.  At this time, pt is assessed at moderately elevated acute risk of harm to self, significantly distressed by sx, and is appropriate for voluntary psychiatric admission.    RECOMMENDATIONS  -continue 1:1 observation for SI while in ED  -plan for 9.13 admission to [facility]  -start Prolixin 5mg QHS for psychosis  -trazodone 50mg QHS PRN for insomnia  -Prolixin 5mg and Ativan 2mg Q6H PRN for acute agitation  -f/u pending labs including TSH, A1c, lipid panel  -collateral from family, ?any recent providers (pt denies)  -counseling about substance use  -d/w ED attending and PA  d/w [facility] attending 44yo man, self-reportedly , domiciled alone in [place], receiving SSD for "learning disabilities", with documented PPH of schizophrenia v schizoaffective d/o, cannabis use, cocaine use, alcohol use, PMH of cataracts, at least three past psychiatric admissions including twice to [facility] (8/15-8/17/2023, 2020 for psychosis and SI), not engaged in OP treatment, +history of incarceration for unknown reason from ages [age], who presents with distressing depressive and psychotic symptoms in setting of reported chronic nonadherence with psychiatric care, seeking admission.     Pt presents with depressive sx including low mood, impaired sleep and appetite, intrusive thoughts, and consideration of suicide without intent or plan, as well as vague AH of "screaming" and VH of figures, no other described sx of psychosis. Pt has been engaging in head-banging behaviors in order to reduce distressing thoughts, and is seeking IPP for stabilization on medication. Pt's TC is notably simple, concrete, vague, and at times with illogical and inconsistent TP; given reported h/o learning disabilities, wonder about possible intellectual impairment contributing to impaired coping. Secondary gain is also possible, noting past homelessness, travel from [place], inconsistencies in provided hx, and evaluation [place] [facility] [place] today, though pt does appear motivated specifically for treatment at [facility]. Substance induced sx are also possible, with pt minimization of past substance use on interview and utox +THC and PCP.  At this time, pt is assessed at moderately elevated acute risk of harm to self, significantly distressed by sx, and is appropriate for voluntary psychiatric admission.    RECOMMENDATIONS  -continue 1:1 observation for SI while in ED  -plan for 9.13 admission to [facility]  -start Prolixin 5mg QHS for psychosis  -trazodone 50mg QHS PRN for insomnia  -Prolixin 5mg and Ativan 2mg Q6H PRN for acute agitation  -f/u pending labs including TSH, A1c, lipid panel  -collateral from family, ?any recent providers (pt denies)  -counseling about substance use  -d/w ED attending and PA  d/w [facility] attending

## 2025-06-19 NOTE — ED PROVIDER NOTE - OBJECTIVE STATEMENT
46 yo M with pmh of schizoaffective disorder, anxiety, depression c/o needing psychiatric admission. Pt states he has been hearing voices telling him to hurt himself and seeing things that arent there. Denies SI or plan but this morning he was banging his head against the wall in an attempt to clear his head.  Prior suicide attempt in 2012 from cutting. Last psyc admission was 2 years ago. Pt reports travelling from [place] yesterday to be admitted to [facility] where he went yesterday but they did not admit him. Pt takes medications but cannot recal the name but ran out 3 days ago. Pt reports occasional marijuana use. Denies etoh. Admits to mild HA. Denies LOC, dizziness, n/v, neck pain, cp, sob, abd pain, n/v/d.

## 2025-06-19 NOTE — ED ADULT NURSE NOTE - OBJECTIVE STATEMENT
Pt is 45 y.o male pt came in for SI. Pt reports he was banging his head on the wall this AM. Pt states woke up feeling suicidal, but has no plans.  Pt calm & cooperative at present. Denies HI. 1:1 initiated, patient in gown, belongings collected and wanded by security. Environment checked for all ligature risks and safety hazards utilizing environmental safety checklist. Endorses PCP, marijuana and cigarettes use. Denies alcohol.

## 2025-06-20 RX ORDER — LORAZEPAM 4 MG/ML
2 VIAL (ML) INJECTION EVERY 6 HOURS
Refills: 0 | Status: DISCONTINUED | OUTPATIENT
Start: 2025-06-20 | End: 2025-06-26

## 2025-06-20 RX ADMIN — Medication 5 MILLIGRAM(S): at 22:20

## 2025-06-20 RX ADMIN — Medication 5 MILLIGRAM(S): at 11:13

## 2025-06-20 RX ADMIN — Medication 400 MILLIGRAM(S): at 16:34

## 2025-06-20 NOTE — BH SOCIAL WORK INITIAL PSYCHOSOCIAL EVALUATION - NSBHABUSECOMMENTFT_PSY_ALL_CORE
Pt. has a significant trauma history including the reported death by suicide by his adult daughter.

## 2025-06-20 NOTE — BH SOCIAL WORK INITIAL PSYCHOSOCIAL EVALUATION - NSBHSAALC_PSY_A_CORE FT
pt denies - prior chart indicates h/o use Pt. stated he drinks a mini bottle of Fireball a few times a week.

## 2025-06-20 NOTE — BH SOCIAL WORK INITIAL PSYCHOSOCIAL EVALUATION - NSBHSATHC_PSY_A_CORE FT
reports frequent use, unable to quantify reports frequent use, stated he smokes about twice a week.

## 2025-06-20 NOTE — BH INPATIENT PSYCHIATRY ASSESSMENT NOTE - NSBHCHARTREVIEWVS_PSY_A_CORE FT
Vital Signs Last 24 Hrs  T(C): 36.6 (06-20-25 @ 08:58), Max: 36.9 (06-19-25 @ 16:32)  T(F): 97.9 (06-20-25 @ 08:58), Max: 98.5 (06-19-25 @ 16:32)  HR: 85 (06-20-25 @ 08:58) (65 - 85)  BP: 91/60 (06-20-25 @ 08:58) (91/60 - 115/80)  BP(mean): --  RR: 18 (06-20-25 @ 08:58) (18 - 18)  SpO2: 98% (06-20-25 @ 08:58) (98% - 98%)

## 2025-06-20 NOTE — BH SOCIAL WORK INITIAL PSYCHOSOCIAL EVALUATION - NSHIGHRISKBEHFT_PSY_ALL_CORE
Pt. has had multiple psychiatric hospitalizations and has a history of one suicide attempt.  Pt. has had multiple psychiatric hospitalizations and has a history of one suicide attempt by cutting per chart. Pt. was not able to clarify whether or not this was a suicide attempt.

## 2025-06-20 NOTE — BH INPATIENT PSYCHIATRY ASSESSMENT NOTE - NSBHASSESSSUMMFT_PSY_ALL_CORE
Patient is a 45 year old male, privately domiciled in [place], unemployed on SSD, PPHx of schizophrenia vs. schizoaffective disorder, polysubstance use (cannabis, cocaine, alcohol, PCP), multiple prior psychiatric hospitalizations (last at [facility] from 8/15-8/17/2023), history of NSSIB (cutting), no known SA, history of incarceration, PMHx of cataracts and thyroid surgery, presenting with psychosis.    On exam, patient is disorganized, paranoid, tangential. He endorses AH of strangers screaming, and VH of people who aren't actually there. He initially endorsed SI, although has since retracted that since admission. He also reports feeling depressed due to memories of his daughter being murdered. He reports a history of frequent cannabis and PCP use. Clinical diagnosis most likely for decompensated schizophrenia in the setting of medication non-compliance, although substance use may also contribute to recent psychosis. Plan to start Prolixin for psychosis, and titrate as indicated. He remains appropriate for psychiatric hospitalization for medication management, stabilization, and safety.    - Admit 9.13  - Continue prolixin 5mg qhs for psychosis  - Prolixin 5mg and Ativan 2mg q6h PRN agitation  - Trazodone 50mg qhs PRN insomnia  - Nicotine patch + nicotine gum PRN

## 2025-06-20 NOTE — BH TREATMENT PLAN - NSTXPSYCHOPRIOR_PSY_ALL_CORE
Marina is a 30 year old  female who presents for annual exam.     Menses are regular q 28-30 days and normal lasting 3-4 days.  Menses flow: normal.  Patient's last menstrual period was 05/15/2018.. Using nuvaring for contraception.  She is currently considering pregnancy.  Besides routine health maintenance,  she would like to discuss family planning.  Is thinking about kids in the next 2 years. Spouse is ready to try now. Her father is in end stages of alcoholism so mood has been down. Taking Wellbutrin 150mg .   GYNECOLOGIC HISTORY:  Menarche:   Marina is sexually active with 1male partner(s) and is currently in monogamous relationship.    History sexually transmitted infections:No STD history  STI testing offered?  Declined  ARTURO exposure: Unknown  History of abnormal Pap smear: NO - age 30- 65 PAP every 3 years recommended  Family history of breast CA: No  Family history of uterine/ovarian CA: No    Family history of colon CA: No    HEALTH MAINTENANCE:  Cholesterol: (  Cholesterol   Date Value Ref Range Status   2013 152 0 - 200 mg/dL Final     Comment:     LDL Cholesterol is the primary guide to therapy.   The NCEP recommends further evaluation of: patients with cholesterol greater   than 200 mg/dL if additional risk factors are present, cholesterol greater   than   240 mg/dL, triglycerides greater than 150 mg/dL, or HDL less than 40 mg/dL.    History of abnormal lipids: No  Mammo: NA . History of abnormal Mammo: No.  Regular Self Breast Exams: No  Calcium/Vitamin D intake: source:  dairy Adequate? Yes  TSH: (  TSH   Date Value Ref Range Status   2012 1.22 0.4 - 5.0 mU/L Final    )  Pap; (  Lab Results   Component Value Date    PAP NIL 2017    PAP NIL 2016    PAP NIL 2012    )    HISTORY:  Obstetric History       T0      L0     SAB0   TAB0   Ectopic0   Multiple0   Live Births0         Past Medical History:   Diagnosis Date     Bulimia nervosa      Bulemia/anorexia--hospitalized in 1999, sees a therpist     Depressive disorder, not elsewhere classified     Depression--off and on, takes Celexa when needed     Frequent UTI 7/2013    will try macrobid     High risk HPV infection 1/2016    not 14 or 16, cotest one year     Past Surgical History:   Procedure Laterality Date     NO HISTORY OF SURGERY       Family History   Problem Relation Age of Onset     Alcohol/Drug Mother      Hypertension Mother      Depression Mother      Alcohol/Drug Father      Depression Father      Hypertension Father      Lipids Father      OSTEOPOROSIS Maternal Grandmother      Myocardial Infarction Maternal Grandfather 63     Depression Other      self     GASTROINTESTINAL DISEASE Other      self     Social History     Social History     Marital status:      Spouse name: Vladimir     Number of children: 0     Years of education: N/A     Occupational History      [employer]     Social History Main Topics     Smoking status: Never Smoker     Smokeless tobacco: Never Used      Comment: social smoker     Alcohol use 3.0 oz/week     5 Standard drinks or equivalent per week     Drug use: No     Sexual activity: Yes     Partners: Male     Birth control/ protection: Inserts, Inserts/Ring      Comment: NUVARING     Other Topics Concern     Parent/Sibling W/ Cabg, Mi Or Angioplasty Before 65f 55m? No     Social History Narrative       Current Outpatient Prescriptions:      buPROPion (WELLBUTRIN XL) 150 MG 24 hr tablet, TAKE 1 TABLET (150 MG) BY MOUTH EVERY MORNING, Disp: 30 tablet, Rfl: 0     etonogestrel-ethinyl estradiol (NUVARING) 0.12-0.015 MG/24HR vaginal ring, Place  vaginally. Place 1 ring every 21 days then remove for 1 week., Disp: 3 each, Rfl: 0     sulfamethoxazole-trimethoprim (BACTRIM DS/SEPTRA DS) 800-160 MG per tablet, Take 1 tablet by mouth 2 times daily, Disp: 14 tablet, Rfl: 0     Allergies   Allergen Reactions     Nkda [No Known Drug Allergies]        Past medical,  "surgical, social and family history were reviewed and updated in EPIC.      EXAM:  /70  Ht 5' 6.25\" (1.683 m)  Wt 132 lb (59.9 kg)  LMP 05/15/2018  BMI 21.14 kg/m2   BMI: Body mass index is 21.14 kg/(m^2).  Constitutional: healthy, alert and no distress  Head: Normocephalic. No masses, lesions, tenderness or abnormalities  Neck: Neck supple. Trachea midline. No adenopathy. Thyroid symmetric, normal size.   Cardiovascular: RRR.   Respiratory: Negative.   Breast: Breasts reveal mild symmetric fibrocystic densities, but there are no dominant, discrete, fixed or suspicious masses found.  Gastrointestinal: Abdomen soft, non-tender, non-distended. No masses, organomegaly.  : deferred--on menses  Musculoskeletal: extremities normal  Skin: no suspicious lesions or rashes  Psychiatric: Affect appropriate, cooperative,mentation appears normal.     COUNSELING:   Reviewed preventive health counseling, as reflected in patient instructions       Contraception       Family planning       Folic Acid Counseling   reports that she has never smoked. She has never used smokeless tobacco.    Body mass index is 21.14 kg/(m^2).    FRAX Risk Assessment    ASSESSMENT:  30 year old female with satisfactory annual exam  (Z01.419) Encounter for gynecological examination without abnormal finding  (primary encounter diagnosis)  Comment: nuvaring  Plan: etonogestrel-ethinyl estradiol (NUVARING)         0.12-0.015 MG/24HR vaginal ring, CBC with         platelets        Refill of ring done. Discussed MVI when trying to get pregnant. (probably not for another 1-2 yrs though)    (F41.9) Anxiety  Comment: POORNIMA 7=13, PHQ=12  Plan: buPROPion (WELLBUTRIN XL) 300 MG 24 hr tablet        Will increase dose since not doing great on 150 mg. Plan to send questions via email in 4-6 weeks to check on her again and she will see her therapist again. (has the same one for last 10 yrs)    Discussed Wellbutrin in pregnancy and relative safety. Since this " med has been working well would not change. Discussed may be able to decrease dose next spring back to 150mg if doing well.     (Z13.220) Screening for lipoid disorders  Comment: last done 2013  Plan: Lipid Profile        Will check lab today since has family history and she is concerned about this.      Marina Chahal MD       7

## 2025-06-20 NOTE — BH INPATIENT PSYCHIATRY ASSESSMENT NOTE - OTHER PAST PSYCHIATRIC HISTORY (INCLUDE DETAILS REGARDING ONSET, COURSE OF ILLNESS, INPATIENT/OUTPATIENT TREATMENT)
Reports diagnosis of learning disabilities since childhood and ?of schizophrenia. Denies any past OP psychiatric care and reports only psychiatric admission was to [facility] in 2023, though chart indicates multiple past admissions including [facility] in 2023, [facility] in 2020.

## 2025-06-20 NOTE — BH SOCIAL WORK INITIAL PSYCHOSOCIAL EVALUATION - OTHER PAST PSYCHIATRIC HISTORY (INCLUDE DETAILS REGARDING ONSET, COURSE OF ILLNESS, INPATIENT/OUTPATIENT TREATMENT)
Psychiatric diagnosis: Schizophrenia vs. schizoaffective disorder  Pt. is a 45 year old  non-caregiver male who presented to the ED with worsening depression, trouble sleeping, and hallucinations. Pt. lives in [place] but has been admitted to [facility] in the past so returned for follow up.  Psychiatric diagnosis: Schizophrenia vs. schizoaffective disorder  Pt. is a 45 year old  non-caregiver male who presented to the ED with worsening depression, trouble sleeping, and hallucinations. Pt. lives in [place] but has been admitted to [facility] in the past so returned for follow up.     Social work and attending psychiatrist met with pt. at bedside. Pt. was A+Ox4 during interview. Pt. stated he was in [facility] when he "had a nervous breakdown" but told others that he came to [facility] for treatment. Pt. stated it is a short train ride from his home in [place] and he comes to [facility] often to shop and see his children. Pt. reported he has been feeling unwell because of the anniversary of his daughter's suicide, but was unable to verbalize when that was except for saying "it was a Tuesday." Pt. was hospitalized at [facility] in 2023 and did not follow up with psychiatric care after that. On interview today, pt. expressed some thoughts that people were targeting him "maybe the Taliban" and that he was "well known in this area," mentioning "the government." Psychiatric diagnosis: Schizophrenia vs. schizoaffective disorder  Pt. is a 45 year old  non-caregiver male who presented to the ED with worsening depression, trouble sleeping, and hallucinations. Pt. lives in [place] but has been admitted to [facility] in the past so returned for follow up.     Social work and attending psychiatrist met with pt. at bedside. Pt. was A+Ox4 during interview. Pt. stated he was in [facility] when he "had a nervous breakdown" but told others that he came to [facility] for treatment. Pt. stated it is a short train ride from his home in [place] and he comes to [facility] often to shop and see his children. Pt. reported he has been feeling unwell because of the anniversary of his daughter's suicide, but was unable to verbalize when that was except for saying "it was a Tuesday." Pt. was hospitalized at [facility] in 2023 and did not follow up with psychiatric care after that. On interview today, pt. expressed some thoughts that people were targeting him "maybe the Taliban" and that he was "well known in this area," mentioning "the government."    Per attending, he spoke with pt's sister, Marline, [phone], who told him she does not want to be involved in pt's care and does not want calls from the hospital.

## 2025-06-20 NOTE — BH INPATIENT PSYCHIATRY ASSESSMENT NOTE - DESCRIPTION
x2 years after wife and daughter (age 22)  by ?suicide (per pt report today), lives alone in [place], receives SSD. Close with sister Linda

## 2025-06-20 NOTE — BH INPATIENT PSYCHIATRY ASSESSMENT NOTE - CURRENT MEDICATION
MEDICATIONS  (STANDING):  fluPHENAZine 5 milliGRAM(s) Oral at bedtime  nicotine -   7 mG/24Hr(s) Patch 1 Patch Transdermal daily    MEDICATIONS  (PRN):  aluminum hydroxide/magnesium hydroxide/simethicone Suspension 30 milliLiter(s) Oral every 4 hours PRN Dyspepsia  fluPHENAZine 5 milliGRAM(s) Oral every 6 hours PRN agitation 2/2 sushma or psychosis  ibuprofen  Tablet. 400 milliGRAM(s) Oral every 6 hours PRN Mild Pain (1 - 3), Moderate Pain (4 - 6)  LORazepam     Tablet 2 milliGRAM(s) Oral every 6 hours PRN agitation 2/2 sushma or psychosis  magnesium hydroxide Suspension 30 milliLiter(s) Oral daily PRN Constipation  nicotine  Polacrilex Gum 2 milliGRAM(s) Oral every 2 hours PRN breakthrough cravings  traZODone 50 milliGRAM(s) Oral at bedtime PRN insomnia

## 2025-06-20 NOTE — BH INPATIENT PSYCHIATRY ASSESSMENT NOTE - HPI (INCLUDE ILLNESS QUALITY, SEVERITY, DURATION, TIMING, CONTEXT, MODIFYING FACTORS, ASSOCIATED SIGNS AND SYMPTOMS)
Per ED Behavioral Health Assessment Note from Dr. Judy Cárdenas on 25:    "4yo man, self-reportedly , domiciled alone in [place], receiving SSD for "learning disabilities", with documented PPH of schizophrenia v schizoaffective d/o, cannabis use, cocaine use, alcohol use, PMH of cataracts, at least two past reported psychiatric admissions to [facility] ([facility]  for psychosis and SI), not engaged in OP treatment, +history of incarceration for unknown reason from ages [age], who presents BIBS with request of psychiatric admission due to distressing mood sx, AH and vague SI.     On interview, pt found asleep, arousable to voice, calm, fully oriented. Pt reports that he traveled from [place] to [place] somewhere between "26 and 36" hours ago in order to seek psychiatric admission due to worsening depression, insomnia with fatigue (cannot quantify sleep - states "my eyes get red"), low appetite, AH of "screaming", VH of figures, and distressing intrusive thoughts about himself, such shame that he is unemployed and needs to get a job, that he reports cause him to consider suicide, though denies intent/plan/recent attempts. Pt reports that he has been banging his head on the wall to lessen to intrusive thoughts; he denies that this is an attempt to harm himself or end his life. Pt does not describe anhedonia, hopelessness, helplessness; denies reckless or impulsive behaviors, mood lability, or elevated energy; does not describe paranoia or delusional thinking. Pt states that he traveled to [facility] because of his satisfaction with past treatment here after his daughter  by suicide - he does not recall the medication used (Prolixin per chart) but states that nothing else has ever helped him. Pt denies any adherence with medication after leaving the hospital in  and denies ever establishing OP psychiatric care. Pt states that he attempted to go to [facility] overnight but accidentally went to [facility] after asking multiple people for directions -states he was evaluated by psychiatry and cleared for dc after he also requested discharge. Pt requests voluntary admission. He provides contact information for his sister Linda Ortiz [phone] - reports she was in support of him coming to the ED.    Per [facility] documentation, pt admitted [facility] for depression, AH, and SI after report of daughter dying by suicide 4 days prior, other stressors including homelessness, recent cocaine use. Pt dx with schizophrenia, treated with Prolixin 5mg QHs, noted rapid improvement and quickly requested dc.       No record found in [facility] (may be medicare recipient). Prior [facility] documentation reviewed."    On exam, patient is disorganized, paranoid, tangential. He reports that he lives in [place] in his own apartment, and traveled to [facility] about 1.5 days ago. He is unable to logically explain why he came to [facility]. He reports that since being in [place], he has been "performing," although explains this in an illogical manner. He reports that after arriving in [facility], the memories of his daughter being murdered in  came to him and he felt depressed. Notably, patient also presented to [facility] in  with a similar story that his daughter committed suicide a few days prior. He reports that he has 4 daughters, when asked about the circumstances, although now 2 are . He has also been hearing voices of strangers screaming words, as well as visions of people who aren't actually there. He reports that a few months ago he experienced CAH to hurt himself, although he did not listen to them because he did not want to die. He believes people are targeting at him, and when asked which people he says "the Taliban." He denies SI/HI. He reports that he smokes cannabis (sometimes laced with K2) and PCP a few times a week, but none since coming to [place] (last use of both cannabis and PCP was ~2 days ago). He also reports occasionally having a shot of Fireball (~1/week). Denies any other substances, although per chart review has also used cocaine as well. He reports that he has never actually tried to take his life, but once cut himself with a razor to "see if [he] was alive." He provides verbal consent to contact his sister, Linda Ortiz [phone]. He also has another sister named Leigh Ann, but he does not remember her number. He reports that he stopped using Prolixin after his last hospitalization and never connected with an outpatient psychiatrist. He is amenable to starting Prolixin, although he is not interested in [facility] at this time. He has also tried Haldol, Zyprexa, Risperidone, and Depakote, although feels like none of them were helpful for him.    Collateral: Sister, Linda Ortiz [phone]: Sister says she does not want to answer any questions, does not want to be involved in her brother's life, and does not want to be contacted again.

## 2025-06-20 NOTE — BH INPATIENT PSYCHIATRY ASSESSMENT NOTE - NSBHMETABOLIC_PSY_ALL_CORE_FT
BMI:   HbA1c: A1C with Estimated Average Glucose Result: 6.1 % (06-19-25 @ 12:30)    Glucose:   BP: 91/60 (06-20-25 @ 08:58) (91/60 - 133/84)Vital Signs Last 24 Hrs  T(C): 36.6 (06-20-25 @ 08:58), Max: 36.9 (06-19-25 @ 16:32)  T(F): 97.9 (06-20-25 @ 08:58), Max: 98.5 (06-19-25 @ 16:32)  HR: 85 (06-20-25 @ 08:58) (65 - 85)  BP: 91/60 (06-20-25 @ 08:58) (91/60 - 115/80)  BP(mean): --  RR: 18 (06-20-25 @ 08:58) (18 - 18)  SpO2: 98% (06-20-25 @ 08:58) (98% - 98%)      Lipid Panel:

## 2025-06-21 RX ORDER — FLUPHENAZINE HCL 10 MG
10 TABLET ORAL AT BEDTIME
Refills: 0 | Status: DISCONTINUED | OUTPATIENT
Start: 2025-06-21 | End: 2025-06-30

## 2025-06-21 RX ADMIN — Medication 10 MILLIGRAM(S): at 22:00

## 2025-06-22 RX ADMIN — Medication 10 MILLIGRAM(S): at 21:04

## 2025-06-22 RX ADMIN — Medication 50 MILLIGRAM(S): at 21:04

## 2025-06-22 RX ADMIN — Medication 400 MILLIGRAM(S): at 18:07

## 2025-06-23 RX ADMIN — Medication 10 MILLIGRAM(S): at 21:56

## 2025-06-23 RX ADMIN — Medication 400 MILLIGRAM(S): at 11:43

## 2025-06-23 RX ADMIN — Medication 400 MILLIGRAM(S): at 11:05

## 2025-06-24 LAB
CHOLEST SERPL-MCNC: 220 MG/DL — HIGH
HDLC SERPL-MCNC: 55 MG/DL — SIGNIFICANT CHANGE UP
LDLC SERPL-MCNC: 146 MG/DL — HIGH
LIPID PNL WITH DIRECT LDL SERPL: 146 MG/DL — HIGH
NONHDLC SERPL-MCNC: 165 MG/DL — HIGH
TRIGL SERPL-MCNC: 109 MG/DL — SIGNIFICANT CHANGE UP

## 2025-06-24 RX ADMIN — Medication 10 MILLIGRAM(S): at 22:21

## 2025-06-24 RX ADMIN — Medication 400 MILLIGRAM(S): at 10:26

## 2025-06-24 RX ADMIN — Medication 400 MILLIGRAM(S): at 17:58

## 2025-06-25 RX ADMIN — Medication 10 MILLIGRAM(S): at 22:45

## 2025-06-25 RX ADMIN — Medication 400 MILLIGRAM(S): at 08:01

## 2025-06-25 RX ADMIN — Medication 2 MILLIGRAM(S): at 13:35

## 2025-06-25 RX ADMIN — Medication 400 MILLIGRAM(S): at 08:08

## 2025-06-25 RX ADMIN — Medication 50 MILLIGRAM(S): at 22:46

## 2025-06-25 NOTE — BH DISCHARGE NOTE NURSING/SOCIAL WORK/PSYCH REHAB - NSDCCRNUMBER_GEN_ALL_CORE_FT
[facility], [place] [address] (near [facility])

## 2025-06-25 NOTE — BH DISCHARGE NOTE NURSING/SOCIAL WORK/PSYCH REHAB - NSCDUDCCRISIS_PSY_A_CORE
.National Suicide Prevention Lifeline 1 (848) 707-1253/.  [employer]  1 (136) LIFENET (010-6279)/988 Suicide and Crisis Lifeline

## 2025-06-25 NOTE — BH DISCHARGE NOTE NURSING/SOCIAL WORK/PSYCH REHAB - NSBHDCAGENCY2FT_PSY_A_CORE
Dr. Abi Leary- [facility]- [facility] Social Service [facility]ic[facility]

## 2025-06-25 NOTE — BH DISCHARGE NOTE NURSING/SOCIAL WORK/PSYCH REHAB - FINANCIAL ASSISTANCE
[facility] provides services at a reduced cost to those who are determined to be eligible through [facility]’s financial assistance program. Information regarding [facility]’s financial assistance program can be found by going to [email or web address] or by calling [phone].

## 2025-06-25 NOTE — BH DISCHARGE NOTE NURSING/SOCIAL WORK/PSYCH REHAB - PATIENT PORTAL LINK FT
You can access the FollowMyHealth Patient Portal offered by [facility] by registering at the following website: [email or web address]. By joining [facility]’s FollowMyHealth portal, you will also be able to view your health information using other applications (apps) compatible with our system.

## 2025-06-25 NOTE — BH DISCHARGE NOTE NURSING/SOCIAL WORK/PSYCH REHAB - NSDCPRGOAL_PSY_ALL_CORE
Over the course of tx., pt. was generally reluctant to join groups, but incrementally attended most groups; pt. was observed to find particular enjoyment in dance/movement therapy groups and allowed for more engagement in this setting; pt. was polite but guarded, occasionally disorganized; pt. was observed to be less disorganized by the conclusion of tx; a pt. was social in the milieu and became increasingly willing to connect with fellow pts; pt. was often found watching sports or news on tv; pt. presented a brighter disposition at discharge; pt. was able to identify coping skills (music, praying, reading,) for future symptom management

## 2025-06-26 RX ORDER — LORAZEPAM 4 MG/ML
2 VIAL (ML) INJECTION EVERY 6 HOURS
Refills: 0 | Status: DISCONTINUED | OUTPATIENT
Start: 2025-06-26 | End: 2025-06-30

## 2025-06-26 RX ADMIN — Medication 400 MILLIGRAM(S): at 11:04

## 2025-06-26 RX ADMIN — Medication 10 MILLIGRAM(S): at 21:08

## 2025-06-26 RX ADMIN — Medication 50 MILLIGRAM(S): at 21:08

## 2025-06-26 RX ADMIN — Medication 400 MILLIGRAM(S): at 11:30

## 2025-06-27 PROCEDURE — 84443 ASSAY THYROID STIM HORMONE: CPT

## 2025-06-27 PROCEDURE — 85025 COMPLETE CBC W/AUTO DIFF WBC: CPT

## 2025-06-27 PROCEDURE — 80048 BASIC METABOLIC PNL TOTAL CA: CPT

## 2025-06-27 PROCEDURE — 36415 COLL VENOUS BLD VENIPUNCTURE: CPT

## 2025-06-27 PROCEDURE — 83036 HEMOGLOBIN GLYCOSYLATED A1C: CPT

## 2025-06-27 PROCEDURE — 80307 DRUG TEST PRSMV CHEM ANLYZR: CPT

## 2025-06-27 PROCEDURE — 87635 SARS-COV-2 COVID-19 AMP PRB: CPT

## 2025-06-27 PROCEDURE — 93005 ELECTROCARDIOGRAM TRACING: CPT

## 2025-06-27 PROCEDURE — 80061 LIPID PANEL: CPT

## 2025-06-27 PROCEDURE — 81001 URINALYSIS AUTO W/SCOPE: CPT

## 2025-06-27 RX ADMIN — Medication 400 MILLIGRAM(S): at 08:50

## 2025-06-27 RX ADMIN — Medication 50 MILLIGRAM(S): at 22:19

## 2025-06-27 RX ADMIN — Medication 400 MILLIGRAM(S): at 07:50

## 2025-06-27 RX ADMIN — Medication 10 MILLIGRAM(S): at 22:22

## 2025-06-27 NOTE — BH INPATIENT PSYCHIATRY PROGRESS NOTE - NSBHMSEPERCEPT_PSY_A_CORE
c/o seeing figures and hearing screeching and screaming sounds/No abnormalities
c/o seeing figures and hearing screeching and screaming sounds/Auditory hallucinations/Visual hallucinations
c/o seeing figures and hearing screeching and screaming sounds/No abnormalities
c/o seeing figures and hearing screeching and screaming sounds/No abnormalities
c/o seeing figures and hearing screeching and screaming sounds/Auditory hallucinations/Visual hallucinations
c/o seeing figures and hearing screeching and screaming sounds/No abnormalities
c/o seeing figures and hearing screeching and screaming sounds/No abnormalities

## 2025-06-27 NOTE — BH INPATIENT PSYCHIATRY PROGRESS NOTE - NSCGISEVERILLNESS_PSY_ALL_CORE
3 = Mildly ill – clearly established symptoms with minimal, if any, distress or difficulty in social and occupational function
4 = Moderately ill – overt symptoms causing noticeable, but modest, functional impairment or distress; symptom level may warrant medication
3 = Mildly ill – clearly established symptoms with minimal, if any, distress or difficulty in social and occupational function
3 = Mildly ill – clearly established symptoms with minimal, if any, distress or difficulty in social and occupational function
4 = Moderately ill – overt symptoms causing noticeable, but modest, functional impairment or distress; symptom level may warrant medication

## 2025-06-27 NOTE — BH INPATIENT PSYCHIATRY PROGRESS NOTE - NSBHATTESTTYPEVISIT_PSY_A_CORE
Resident/Fellow with telephonic supervision

## 2025-06-27 NOTE — BH INPATIENT PSYCHIATRY PROGRESS NOTE - NSBHMSETHTCONTENT_PSY_A_CORE
simple and concrete, no clear voiced delusions or paranoia/Unremarkable
simple and concrete, no clear voiced delusions or paranoia/Delusions
simple and concrete, no clear voiced delusions or paranoia/Delusions

## 2025-06-27 NOTE — BH INPATIENT PSYCHIATRY PROGRESS NOTE - NSTXDEPRESINTERMD_PSY_ALL_CORE
Start prolixin
Start prolixin
Titrate prolixin
Titrate prolixin
Start prolixin
Titrate prolixin
Start prolixin

## 2025-06-27 NOTE — BH INPATIENT PSYCHIATRY PROGRESS NOTE - NSBHATTESTBILLING_PSY_A_CORE
49490-Iyxbeearon OBS or IP - moderate complexity OR 35-49 mins
88374-Atahunwufi OBS or IP - moderate complexity OR 35-49 mins
82205-Rpxnuzmogl OBS or IP - moderate complexity OR 35-49 mins
85291-Delxvezxnl OBS or IP - moderate complexity OR 35-49 mins
99222-Initial OBS or IP - moderate complexity OR 55-74 mins
61866-Pfweymoqta OBS or IP - moderate complexity OR 35-49 mins

## 2025-06-27 NOTE — BH INPATIENT PSYCHIATRY PROGRESS NOTE - PRN MEDS
MEDICATIONS  (PRN):  aluminum hydroxide/magnesium hydroxide/simethicone Suspension 30 milliLiter(s) Oral every 4 hours PRN Dyspepsia  fluPHENAZine 5 milliGRAM(s) Oral every 6 hours PRN agitation 2/2 sushma or psychosis  ibuprofen  Tablet. 400 milliGRAM(s) Oral every 6 hours PRN Mild Pain (1 - 3), Moderate Pain (4 - 6)  LORazepam     Tablet 2 milliGRAM(s) Oral every 6 hours PRN agitation  magnesium hydroxide Suspension 30 milliLiter(s) Oral daily PRN Constipation  nicotine  Polacrilex Gum 2 milliGRAM(s) Oral every 2 hours PRN breakthrough cravings  traZODone 50 milliGRAM(s) Oral at bedtime PRN insomnia  

## 2025-06-27 NOTE — BH INPATIENT PSYCHIATRY PROGRESS NOTE - NSBHATTESTATTENDSUPERVFT_PSY_A_CORE
Subjective  Chief Complaint   Patient presents with   • Gynecologic Exam   • Contraception     Patient is 29 y.o.  here for annual examination.  Pt had last pap last year and reports normal.  Pt previously on NuvaRing; did well but lost insurance so has been off since 2017.  Pt would like to consider other options for birth control; does not want to be on pill.  Pt with no major health issues.  Menses are regular q month; heavy and painful at times.  Menses normally last 4-5 days.    History  Past Medical History:   Diagnosis Date   • Depression    • History of Papanicolaou smear of cervix     NORMAL    • Ovarian cyst    • PCOS (polycystic ovarian syndrome)    • Pre-menstrual mood disorder      No current outpatient prescriptions on file prior to visit.     No current facility-administered medications on file prior to visit.      No Known Allergies  Past Surgical History:   Procedure Laterality Date   • EAR TUBES Bilateral    • WISDOM TOOTH EXTRACTION       Family History   Problem Relation Age of Onset   • No Known Problems Father    • Cervical cancer Mother    • Hypertension Mother    • Hyperlipidemia Mother    • Cancer Paternal Grandmother    • Breast cancer Maternal Grandmother    • Cervical cancer Maternal Grandmother    • Hypertension Maternal Grandmother    • Hyperlipidemia Maternal Grandmother    • Breast cancer Maternal Aunt    • Cervical cancer Maternal Aunt    • Breast cancer Maternal Aunt      Social History     Social History   • Marital status: Single     Spouse name: N/A   • Number of children: N/A   • Years of education: N/A     Social History Main Topics   • Smoking status: Current Every Day Smoker     Packs/day: 0.50     Types: Cigarettes   • Smokeless tobacco: Never Used   • Alcohol use No   • Drug use: No   • Sexual activity: Yes     Partners: Male     Birth control/ protection: Condom     Other Topics Concern   • None     Social History Narrative   • None     Review of 
"Systems  All systems were reviewed and negative except for:  Behavioral/Psych: positive for  unstable mood     Objective  Vitals:    12/28/17 1502   BP: 126/76   Weight: 120 kg (264 lb)   Height: 170.2 cm (67\")     Physical Exam:  General Appearance: alert, appears stated age and cooperative  Head: normocephalic, without obvious abnormality and atraumatic  Eyes: lids and lashes normal, conjunctivae and sclerae normal, no icterus, no pallor, corneas clear and PERRLA  Ears: ears appear intact with no abnormalities noted  Nose: nares normal, septum midline, mucosa normal and no drainage  Neck: suppple, trachea midline and no thyromegaly  Lungs: clear to auscultation, respirations regular, respirations even and respirations unlabored  Heart: regular rhythm and normal rate, normal S1, S2, no murmur, gallop, or rubs and no click  Breasts: Examined in supine position  Symmetric without masses or skin dimpling  Nipples normal without inversion, lesions or discharge  There are no palpable axillary nodes  Abdomen: normal bowel sounds, no masses, no hepatomegaly, no splenomegaly, soft non-tender, no guarding and no rebound tenderness  Pelvic: Clinical staff was present for exam  External genitalia:  normal appearance of the external genitalia including Bartholin's and Allouez's glands.  :  urethral meatus normal;  Vaginal:  normal pink mucosa without prolapse or lesions.  Cervix:  normal appearance.  Uterus:  normal size, shape and consistency.  Adnexa:  normal bimanual exam of the adnexa.  Extremities: moves extremities well, no edema, no cyanosis and no redness  Skin: no bleeding, bruising or rash and no lesions noted  Lymph Nodes: no palpable adenopathy  Psych: normal mood and affect, oriented to person, time and place, thought content organized and appropriate judgment    Lab Review   No data reviewed    Imaging   No data reviewed    Assessment/Plan    Problem List Items Addressed This Visit     None      Visit Diagnoses  "
   Encounter for gynecological examination without abnormal finding    -  Primary  Pap was done today.  If she does not receive the results of the Pap within 2 weeks  time, she was instructed to call to find out the results.  I explained to Janis that the recommendations for Pap smear interval in a low risk patient has lengthened to 3 years time if cytology alone normal or  5 years time if both cytology and HPV testing were normal.  I encouraged her to be seen yearly for a full physical exam including breast and pelvic exam even during the off years when PAP's will not be performed.    Contraceptive counseling was provided.  The various options for contraception was discussed including natural family planning, withdrawal method, barrier methods, spermicides, oral contraception, transdermal patch, vaginal ring, injection, implant, and IUDs.  The risks, complications, failure rates, and benefits of each were discussed.  Pt desires trial with Nexplanon.  Will check on coverage and plan insertion with next menses if desired.    Relevant Orders    Pap IG, Rfx HPV ASCU - ThinPrep Vial, Cervix            Follow up as discussed     This note was electronically signed.  Lilo Montelongo M.D.    
Dr. Chamorro
Dave Ritchie MD

## 2025-06-27 NOTE — BH INPATIENT PSYCHIATRY PROGRESS NOTE - NSBHFUPINTERVALCCFT_PSY_A_CORE
"All good."
"I'm ready to go."
"my voices are screaming at me"		
"I'm tired because I couldn't get enough sleep"		
"Nothing new."
"Ready to go."
"I thought I was coming to get a refill."

## 2025-06-27 NOTE — BH INPATIENT PSYCHIATRY PROGRESS NOTE - NSBHMETABOLIC_PSY_ALL_CORE_FT
BMI:   HbA1c: A1C with Estimated Average Glucose Result: 6.1 % (06-19-25 @ 12:30)    Glucose:   BP: 124/80 (06-25-25 @ 10:10) (114/83 - 126/85)Vital Signs Last 24 Hrs  T(C): 36.9 (06-25-25 @ 10:10), Max: 36.9 (06-25-25 @ 10:10)  T(F): 98.4 (06-25-25 @ 10:10), Max: 98.4 (06-25-25 @ 10:10)  HR: 77 (06-25-25 @ 10:10) (77 - 87)  BP: 124/80 (06-25-25 @ 10:10) (117/84 - 124/80)  BP(mean): --  RR: 18 (06-25-25 @ 10:10) (18 - 18)  SpO2: 95% (06-25-25 @ 10:10) (95% - 97%)      Lipid Panel: Date/Time: 06-24-25 @ 07:26  Cholesterol, Serum: 220  LDL Cholesterol Calculated: 146  HDL Cholesterol, Serum: 55  Total Cholesterol/HDL Ration Measurement: --  Triglycerides, Serum: 109  
BMI:   HbA1c: A1C with Estimated Average Glucose Result: 6.1 % (06-19-25 @ 12:30)    Glucose:   BP: 121/86 (06-21-25 @ 07:38) (91/60 - 133/84)Vital Signs Last 24 Hrs  T(C): 36.7 (06-21-25 @ 07:38), Max: 36.7 (06-21-25 @ 07:38)  T(F): 98 (06-21-25 @ 07:38), Max: 98 (06-21-25 @ 07:38)  HR: 82 (06-21-25 @ 07:38) (82 - 93)  BP: 121/86 (06-21-25 @ 07:38) (116/82 - 121/86)  BP(mean): --  RR: --  SpO2: 97% (06-21-25 @ 07:38) (97% - 97%)      Lipid Panel: 
BMI:   HbA1c: A1C with Estimated Average Glucose Result: 6.1 % (06-19-25 @ 12:30)    Glucose:   BP: 124/87 (06-26-25 @ 09:13) (114/83 - 124/87)Vital Signs Last 24 Hrs  T(C): 36.8 (06-26-25 @ 09:13), Max: 36.9 (06-25-25 @ 10:10)  T(F): 98.2 (06-26-25 @ 09:13), Max: 98.4 (06-25-25 @ 10:10)  HR: 75 (06-26-25 @ 09:13) (75 - 94)  BP: 124/87 (06-26-25 @ 09:13) (117/83 - 124/87)  BP(mean): --  RR: 18 (06-25-25 @ 19:54) (18 - 18)  SpO2: 98% (06-26-25 @ 09:13) (95% - 98%)      Lipid Panel: Date/Time: 06-24-25 @ 07:26  Cholesterol, Serum: 220  LDL Cholesterol Calculated: 146  HDL Cholesterol, Serum: 55  Total Cholesterol/HDL Ration Measurement: --  Triglycerides, Serum: 109  
BMI:   HbA1c: A1C with Estimated Average Glucose Result: 6.1 % (06-19-25 @ 12:30)    Glucose:   BP: 115/83 (06-22-25 @ 08:48) (91/60 - 121/86)Vital Signs Last 24 Hrs  T(C): 36.9 (06-21-25 @ 16:36), Max: 36.9 (06-21-25 @ 16:36)  T(F): 98.4 (06-21-25 @ 16:36), Max: 98.4 (06-21-25 @ 16:36)  HR: 78 (06-22-25 @ 08:48) (78 - 86)  BP: 115/83 (06-22-25 @ 08:48) (114/81 - 115/83)  BP(mean): --  RR: --  SpO2: 98% (06-22-25 @ 08:48) (96% - 98%)      Lipid Panel: 
BMI:   HbA1c: A1C with Estimated Average Glucose Result: 6.1 % (06-19-25 @ 12:30)    Glucose:   BP: 114/83 (06-24-25 @ 07:38) (114/81 - 126/85)Vital Signs Last 24 Hrs  T(C): --  T(F): --  HR: 81 (06-24-25 @ 07:38) (81 - 89)  BP: 114/83 (06-24-25 @ 07:38) (114/83 - 116/84)  BP(mean): --  RR: --  SpO2: 95% (06-24-25 @ 07:38) (92% - 95%)      Lipid Panel: Date/Time: 06-24-25 @ 07:26  Cholesterol, Serum: 220  LDL Cholesterol Calculated: 146  HDL Cholesterol, Serum: 55  Total Cholesterol/HDL Ration Measurement: --  Triglycerides, Serum: 109  
BMI:   HbA1c: A1C with Estimated Average Glucose Result: 6.1 % (06-19-25 @ 12:30)    Glucose:   BP: 122/87 (06-23-25 @ 09:56) (114/81 - 126/85)Vital Signs Last 24 Hrs  T(C): 36.6 (06-23-25 @ 09:56), Max: 36.9 (06-22-25 @ 17:00)  T(F): 97.9 (06-23-25 @ 09:56), Max: 98.5 (06-22-25 @ 17:00)  HR: 93 (06-23-25 @ 09:56) (82 - 93)  BP: 122/87 (06-23-25 @ 09:56) (122/87 - 126/85)  BP(mean): --  RR: 18 (06-23-25 @ 09:56) (18 - 18)  SpO2: 100% (06-23-25 @ 09:56) (97% - 100%)      Lipid Panel: 
BMI:   HbA1c: A1C with Estimated Average Glucose Result: 6.1 % (06-19-25 @ 12:30)    Glucose:   BP: 122/85 (06-27-25 @ 09:21) (114/79 - 125/88)Vital Signs Last 24 Hrs  T(C): --  T(F): --  HR: 72 (06-27-25 @ 09:21) (72 - 85)  BP: 122/85 (06-27-25 @ 09:21) (114/79 - 125/88)  BP(mean): --  RR: 18 (06-26-25 @ 20:24) (18 - 18)  SpO2: 97% (06-27-25 @ 09:21) (94% - 97%)      Lipid Panel: Date/Time: 06-24-25 @ 07:26  Cholesterol, Serum: 220  LDL Cholesterol Calculated: 146  HDL Cholesterol, Serum: 55  Total Cholesterol/HDL Ration Measurement: --  Triglycerides, Serum: 109

## 2025-06-27 NOTE — BH INPATIENT PSYCHIATRY PROGRESS NOTE - NSDCCRITERIA_PSY_ALL_CORE
Improvement in psychosis, no SI

## 2025-06-27 NOTE — BH INPATIENT PSYCHIATRY PROGRESS NOTE - NSBHMETABOLICLABS_PSY_ALL_CORE
All labs not within last 12 months, ordered

## 2025-06-27 NOTE — CHART NOTE - NSCHARTNOTEFT_GEN_A_CORE
Chart reviwed. Sw sent several referrals to IOP programs in [place], [place]. Due to patient's managed medicare plan, [employer] [facility], there are limited acceptances. Patient requested outpatient follow up at [facility] clinic. Per [facility], there is a 3 month waitlist for a provider. Patient, while known to the hospital, is not established with a provider.  SW sent referral to [facility] IOP/PHP program and spoke with Admissions Director Cara Lopez (P. [phone]). Due to patient's substance use, he is not eligible and would need dual diagnosis programing. Raysa provided with outpatient program of [facility], which Sw was unable to reach after calling multiple times. [facility] is also a known outpatient walk in clinic. Raysa then sent referrals to [facility] (P. [phone]). Per Stefany (), they are not yet in network with patient's [employer] Medicare plan and as he does not have Part C of Medicare advanatage, he is not eligible. Raysa provided with resources of [facility], [facility], and [employer]. Sw sent referrals to [facility] and [email or web address]. SW unable to reach provider at [facility]. Sw to continue to find outpatient resources for patient's planned discharge Monday, 6/30.        [facility]  : Marcie Greene; [email or web address]  :Ting Osorio; [email or web address]  P. [phone]    [facility]  P. [phone]    [facility] [employer]  P. [phone] Chart reviwed. Sw sent several referrals to IOP programs in [place], [place]. Due to patient's managed medicare plan, [facility] [facility], there are limited acceptances. Patient requested outpatient follow up at [facility] clinic. Per [facility], there is a 3 month waitlist for a provider. Patient, while known to the hospital, is not established with a provider.  SW sent referral to [facility] IOP/PHP program and spoke with Admissions Director Cara Lopez (P. [phone]). Due to patient's substance use, he is not eligible and would need dual diagnosis programing. R[facility]a provided with outpatient program of [facility], which Sw was unable to reach after calling multiple times. [facility] is also a known outpatient walk in clinic. Raysa then sent referrals to [facility] (P. [phone]). Per Stefany (), they are not yet in network with patient's [employer] Medicare plan and as he does not have Part C of Medicare advanatage, he is not eligible. Raysa provided with resources of [facility], [facility], and [facility]. Sw sent referrals to [facility] and [facility]. SW unable to reach provider at [facility]; Sw faxed clinicals to [facility]. Sw to continue to find outpatient resources for patient's planned discharge Monday, 6/30.        [facility]  : Marcie Greene; [email or web address]  :Ting Osorio; [email or web address]  P. [phone]    [facility]  P. [phone]  F.[phone]    [facility]  P. [phone]

## 2025-06-27 NOTE — BH INPATIENT PSYCHIATRY PROGRESS NOTE - NSBHMSETHTPROC_PSY_A_CORE
loosely organized, at times illogical/Disorganized/Tangential
loosely organized, at times illogical/Linear
loosely organized, at times illogical/Disorganized/Tangential
loosely organized, at times illogical/Linear

## 2025-06-27 NOTE — BH INPATIENT PSYCHIATRY PROGRESS NOTE - NSBHMSERECMEM_PSY_A_CORE
inconsistencies in provided hx including recent hx/Impaired
inconsistencies in provided hx including recent hx/Normal
inconsistencies in provided hx including recent hx/Normal
inconsistencies in provided hx including recent hx/Impaired
inconsistencies in provided hx including recent hx/Normal

## 2025-06-27 NOTE — BH INPATIENT PSYCHIATRY PROGRESS NOTE - NSBHFUPINTERVALHXFT_PSY_A_CORE
Seen at bedside, covered with blanket. Reports feeling tired and needing more sleep as the AC noise didn't let him sleep last night. Recommended asking for PRN. Reports not doing well because of his daughter's death's anniversary. Reports hearing "Screaming". He has not felt the increase in dose of his medication. Denies side effects, SI, HI.
Seen at bedside, reporting that his voices are screaming at him and it is hard to pay attention to things apart from the voices. Seen to be RTIS throughout the interview, scared and paranoid. States that prolixin did help him with the voices before when at a higher dose. No SI, HI.
No acute interval events. Allowed for blood work this morning.    On exam, patient is calm, cooperative, linear, organized, in good behavioral control.  Denies SI/HI/AH/VH. Denies side effects to medication. Reports good sleep and appetite. Discharge focused.
No acute interval events. Over the weekend, Prolixin increased to 10mg nightly.    On exam, patient is irritable, goal-directed towards discharge. Denies SI/HI/AH/VH. He reports that he thought he came here for a refill of medication, and was upset at his hospitalization (despite not saying this on his intake). He denies feeling depressed, and when asked about the death of his daughter, minimizes this now saying that from time to time he feels down about this but currently is not sad at all about it. When asked about endorsing voices screaming over the weekend, he tells me he was referring to the other patients on the unit screaming, and denies any hallucinations. He denies side effects to medication. He reports good sleep and appetite. He requests to be discharged as soon as possible to go back to [place].
No acute interval events. Given Trazodone 50mg for insomnia last night.    On exam, patient is calm, cooperative, linear, organized, in good behavioral control.  Denies SI/HI/AH/VH. Denies side effects to medication. Reports good sleep (despite yesterday requesting Trazodone) and appetite. Continues to be discharge focused.
No acute interval events. Given Trazodone 50mg for insomnia.    On exam, patient is calm, cooperative, linear, organized, in good behavioral control.  Denies SI/HI/AH/VH. Denies side effects to medication. Attending groups. Continues to be discharge focused.
No acute interval events.     On exam, patient is calm, cooperative, linear, organized, in good behavioral control.  Denies SI/HI/AH/VH. Denies side effects to medication. Reports good sleep and appetite. Participating in groups. Continues to be discharge focused.

## 2025-06-27 NOTE — BH INPATIENT PSYCHIATRY PROGRESS NOTE - NSBHCHARTREVIEWVS_PSY_A_CORE FT
Vital Signs Last 24 Hrs  T(C): --  T(F): --  HR: 72 (06-27-25 @ 09:21) (72 - 85)  BP: 122/85 (06-27-25 @ 09:21) (114/79 - 125/88)  BP(mean): --  RR: 18 (06-26-25 @ 20:24) (18 - 18)  SpO2: 97% (06-27-25 @ 09:21) (94% - 97%)    
Vital Signs Last 24 Hrs  T(C): 36.6 (06-23-25 @ 09:56), Max: 36.9 (06-22-25 @ 17:00)  T(F): 97.9 (06-23-25 @ 09:56), Max: 98.5 (06-22-25 @ 17:00)  HR: 93 (06-23-25 @ 09:56) (82 - 93)  BP: 122/87 (06-23-25 @ 09:56) (122/87 - 126/85)  BP(mean): --  RR: 18 (06-23-25 @ 09:56) (18 - 18)  SpO2: 100% (06-23-25 @ 09:56) (97% - 100%)    
Vital Signs Last 24 Hrs  T(C): 36.9 (06-25-25 @ 10:10), Max: 36.9 (06-25-25 @ 10:10)  T(F): 98.4 (06-25-25 @ 10:10), Max: 98.4 (06-25-25 @ 10:10)  HR: 77 (06-25-25 @ 10:10) (77 - 87)  BP: 124/80 (06-25-25 @ 10:10) (117/84 - 124/80)  BP(mean): --  RR: 18 (06-25-25 @ 10:10) (18 - 18)  SpO2: 95% (06-25-25 @ 10:10) (95% - 97%)    
Vital Signs Last 24 Hrs  T(C): 36.9 (06-21-25 @ 16:36), Max: 36.9 (06-21-25 @ 16:36)  T(F): 98.4 (06-21-25 @ 16:36), Max: 98.4 (06-21-25 @ 16:36)  HR: 78 (06-22-25 @ 08:48) (78 - 86)  BP: 115/83 (06-22-25 @ 08:48) (114/81 - 115/83)  BP(mean): --  RR: --  SpO2: 98% (06-22-25 @ 08:48) (96% - 98%)    
Vital Signs Last 24 Hrs  T(C): 36.8 (06-26-25 @ 09:13), Max: 36.9 (06-25-25 @ 10:10)  T(F): 98.2 (06-26-25 @ 09:13), Max: 98.4 (06-25-25 @ 10:10)  HR: 75 (06-26-25 @ 09:13) (75 - 94)  BP: 124/87 (06-26-25 @ 09:13) (117/83 - 124/87)  BP(mean): --  RR: 18 (06-25-25 @ 19:54) (18 - 18)  SpO2: 98% (06-26-25 @ 09:13) (95% - 98%)    
Vital Signs Last 24 Hrs  T(C): --  T(F): --  HR: 81 (06-24-25 @ 07:38) (81 - 89)  BP: 114/83 (06-24-25 @ 07:38) (114/83 - 116/84)  BP(mean): --  RR: --  SpO2: 95% (06-24-25 @ 07:38) (92% - 95%)    
Vital Signs Last 24 Hrs  T(C): 36.7 (06-21-25 @ 07:38), Max: 36.7 (06-21-25 @ 07:38)  T(F): 98 (06-21-25 @ 07:38), Max: 98 (06-21-25 @ 07:38)  HR: 82 (06-21-25 @ 07:38) (82 - 93)  BP: 121/86 (06-21-25 @ 07:38) (116/82 - 121/86)  BP(mean): --  RR: --  SpO2: 97% (06-21-25 @ 07:38) (97% - 97%)

## 2025-06-27 NOTE — BH INPATIENT PSYCHIATRY PROGRESS NOTE - CURRENT MEDICATION
MEDICATIONS  (STANDING):  fluPHENAZine 10 milliGRAM(s) Oral at bedtime  nicotine -   7 mG/24Hr(s) Patch 1 Patch Transdermal daily    MEDICATIONS  (PRN):  aluminum hydroxide/magnesium hydroxide/simethicone Suspension 30 milliLiter(s) Oral every 4 hours PRN Dyspepsia  fluPHENAZine 5 milliGRAM(s) Oral every 6 hours PRN agitation 2/2 sushma or psychosis  ibuprofen  Tablet. 400 milliGRAM(s) Oral every 6 hours PRN Mild Pain (1 - 3), Moderate Pain (4 - 6)  LORazepam     Tablet 2 milliGRAM(s) Oral every 6 hours PRN agitation  magnesium hydroxide Suspension 30 milliLiter(s) Oral daily PRN Constipation  nicotine  Polacrilex Gum 2 milliGRAM(s) Oral every 2 hours PRN breakthrough cravings  traZODone 50 milliGRAM(s) Oral at bedtime PRN insomnia  

## 2025-06-28 RX ADMIN — Medication 10 MILLIGRAM(S): at 21:05

## 2025-06-28 RX ADMIN — Medication 400 MILLIGRAM(S): at 11:55

## 2025-06-28 RX ADMIN — Medication 50 MILLIGRAM(S): at 21:04

## 2025-06-28 NOTE — BH SAFETY PLAN - WARNING SIGN 1
Pt completed a written safety plan and was provided a copy; additional copies are filed on the unit.

## 2025-06-29 RX ADMIN — Medication 5 MILLIGRAM(S): at 03:03

## 2025-06-29 RX ADMIN — Medication 10 MILLIGRAM(S): at 21:23

## 2025-06-29 RX ADMIN — Medication 2 MILLIGRAM(S): at 03:04

## 2025-06-29 RX ADMIN — Medication 50 MILLIGRAM(S): at 21:23

## 2025-06-30 VITALS
DIASTOLIC BLOOD PRESSURE: 75 MMHG | TEMPERATURE: 98 F | SYSTOLIC BLOOD PRESSURE: 110 MMHG | OXYGEN SATURATION: 98 % | HEART RATE: 83 BPM | RESPIRATION RATE: 18 BRPM

## 2025-06-30 PROCEDURE — 85025 COMPLETE CBC W/AUTO DIFF WBC: CPT

## 2025-06-30 PROCEDURE — 84443 ASSAY THYROID STIM HORMONE: CPT

## 2025-06-30 PROCEDURE — 36415 COLL VENOUS BLD VENIPUNCTURE: CPT

## 2025-06-30 PROCEDURE — 81001 URINALYSIS AUTO W/SCOPE: CPT

## 2025-06-30 PROCEDURE — 83036 HEMOGLOBIN GLYCOSYLATED A1C: CPT

## 2025-06-30 PROCEDURE — 99239 HOSP IP/OBS DSCHRG MGMT >30: CPT

## 2025-06-30 PROCEDURE — 99285 EMERGENCY DEPT VISIT HI MDM: CPT

## 2025-06-30 PROCEDURE — 93005 ELECTROCARDIOGRAM TRACING: CPT

## 2025-06-30 PROCEDURE — 80048 BASIC METABOLIC PNL TOTAL CA: CPT

## 2025-06-30 PROCEDURE — 80061 LIPID PANEL: CPT

## 2025-06-30 PROCEDURE — 87635 SARS-COV-2 COVID-19 AMP PRB: CPT

## 2025-06-30 PROCEDURE — 80307 DRUG TEST PRSMV CHEM ANLYZR: CPT

## 2025-06-30 RX ORDER — FLUPHENAZINE HCL 10 MG
1 TABLET ORAL
Qty: 14 | Refills: 0
Start: 2025-06-30 | End: 2025-07-13

## 2025-06-30 RX ORDER — NICOTINE POLACRILEX 4 MG/1
1 GUM, CHEWING ORAL
Qty: 5 | Refills: 0
Start: 2025-06-30 | End: 2025-07-13

## 2025-06-30 RX ADMIN — Medication 400 MILLIGRAM(S): at 10:22

## 2025-06-30 NOTE — BH INPATIENT PSYCHIATRY DISCHARGE NOTE - NSBHANTIPSYCHOTIC_PSY_ALL_CORE_FT
metabolic screening could not be completed due to the patient's enduring unstable psychological condition

## 2025-06-30 NOTE — BH INPATIENT PSYCHIATRY DISCHARGE NOTE - NSBHASSESSSUMMFT_PSY_ALL_CORE
Patient is a 45 year old male, privately domiciled in [place], unemployed on SSD, PPHx of schizophrenia vs. schizoaffective disorder, polysubstance use (cannabis, cocaine, alcohol, PCP), multiple prior psychiatric hospitalizations (last at [facility] from 8/15-8/17/2023), history of NSSIB (cutting), no known SA, history of incarceration, PMHx of cataracts and thyroid surgery, presenting with psychosis.    On exam, patient is disorganized, paranoid, tangential. He endorses AH of strangers screaming, and VH of people who aren't actually there. He initially endorsed SI, although has since retracted that since admission. He also reports feeling depressed due to memories of his daughter being murdered. He reports a history of frequent cannabis and PCP use. Clinical diagnosis most likely for decompensated schizophrenia in the setting of medication non-compliance, although substance use may also contribute to recent psychosis. He was titrated on Prolixin for psychosis, with improvement in organization and resolution of AH and VH.

## 2025-06-30 NOTE — BH INPATIENT PSYCHIATRY DISCHARGE NOTE - HOSPITAL COURSE
Patient was admitted on 6/20/25. He was disorganized and endorsed auditory hallucinations of people screaming and seeing people that were not there. Clinical diagnosis most likely for decompensated schizophrenia in the setting of medication non-compliance. He was started on Prolixin, which was titrated to 10mg, with improvement in organization and resolution of AH and VH. He participated in groups, was consistently in good behavioral control, and consistently denied SI/HI/AH/VH. He was motivated to return to [place], and was connected to outpatient mental health care in [place]. On 6/30/25, patient continued to deny SI/HI/AH/VH and was calm, cooperative, linear, and organized. He was discharged on 6/30/25 and given a 2 week prescription of his medications.    Continue the following medications:  - Prolixin 10mg daily, starting 7/1/25  - Nicotine gum 3 times a day, starting 6/30/25

## 2025-06-30 NOTE — BH INPATIENT PSYCHIATRY DISCHARGE NOTE - REASON FOR ADMISSION
45 year old male who lives in [place], with past psychiatric history of schizophrenia versus schizoaffective disorder, substance use, and past medical history of cataracts and thyroid surgery, who presented with psychosis. He was voluntarily admitted for medication management and safety.

## 2025-06-30 NOTE — BH INPATIENT PSYCHIATRY DISCHARGE NOTE - NSBHFUPINTERVALHXFT_PSY_A_CORE
No acute interval events.    On exam, patient is calm, cooperative, euthymic, linear, organized. He denies SI/HI/AH/VH. He denies side effects to medication. He reports good sleep and appetite. His motivated for discharge.

## 2025-06-30 NOTE — BH INPATIENT PSYCHIATRY DISCHARGE NOTE - HPI (INCLUDE ILLNESS QUALITY, SEVERITY, DURATION, TIMING, CONTEXT, MODIFYING FACTORS, ASSOCIATED SIGNS AND SYMPTOMS)
Per ED Behavioral Health Assessment Note from Dr. Judy Cárdenas on 25:    "4yo man, self-reportedly , domiciled alone in [place], receiving SSD for "learning disabilities", with documented PPH of schizophrenia v schizoaffective d/o, cannabis use, cocaine use, alcohol use, PMH of cataracts, at least two past reported psychiatric admissions to [facility] (8/,  for psychosis and SI), not engaged in OP treatment, +history of incarceration for unknown reason from ages [age], who presents BIBS with request of psychiatric admission due to distressing mood sx, AH and vague SI.     On interview, pt found asleep, arousable to voice, calm, fully oriented. Pt reports that he traveled from [place] to [place] somewhere between "26 and 36" hours ago in order to seek psychiatric admission due to worsening depression, insomnia with fatigue (cannot quantify sleep - states "my eyes get red"), low appetite, AH of "screaming", VH of figures, and distressing intrusive thoughts about himself, such shame that he is unemployed and needs to get a job, that he reports cause him to consider suicide, though denies intent/plan/recent attempts. Pt reports that he has been banging his head on the wall to lessen to intrusive thoughts; he denies that this is an attempt to harm himself or end his life. Pt does not describe anhedonia, hopelessness, helplessness; denies reckless or impulsive behaviors, mood lability, or elevated energy; does not describe paranoia or delusional thinking. Pt states that he traveled to [facility] because of his satisfaction with past treatment here after his daughter  by suicide - he does not recall the medication used (Prolixin per chart) but states that nothing else has ever helped him. Pt denies any adherence with medication after leaving the hospital in  and denies ever establishing OP psychiatric care. Pt states that he attempted to go to [facility] overnight but accidentally went to [facility] after asking multiple people for directions -states he was evaluated by psychiatry and cleared for dc after he also requested discharge. Pt requests voluntary admission. He provides contact information for his sister Linda Ortiz [phone] - reports she was in support of him coming to the ED.    Per [facility] documentation, pt admitted 8/ for depression, AH, and SI after report of daughter dying by suicide 4 days prior, other stressors including homelessness, recent cocaine use. Pt dx with schizophrenia, treated with Prolixin 5mg QHs, noted rapid improvement and quickly requested dc.       No record found in [facility] (may be medicare recipient). Prior 8s documentation reviewed."    On exam, patient is disorganized, paranoid, tangential. He reports that he lives in [place] in his own apartment, and traveled to [facility] about 1.5 days ago. He is unable to logically explain why he came to [facility]. He reports that since being in [place], he has been "performing," although explains this in an illogical manner. He reports that after arriving in [facility], the memories of his daughter being murdered in  came to him and he felt depressed. Notably, patient also presented to [facility] in  with a similar story that his daughter committed suicide a few days prior. He reports that he has 4 daughters, when asked about the circumstances, although now 2 are . He has also been hearing voices of strangers screaming words, as well as visions of people who aren't actually there. He reports that a few months ago he experienced CAH to hurt himself, although he did not listen to them because he did not want to die. He believes people are targeting at him, and when asked which people he says "the Taliban." He denies SI/HI. He reports that he smokes cannabis (sometimes laced with K2) and PCP a few times a week, but none since coming to [place] (last use of both cannabis and PCP was ~2 days ago). He also reports occasionally having a shot of Fireball (~1/week). Denies any other substances, although per chart review has also used cocaine as well. He reports that he has never actually tried to take his life, but once cut himself with a razor to "see if [he] was alive." He provides verbal consent to contact his sister, Linda Ortiz [phone]. He also has another sister named Leigh Ann, but he does not remember her number. He reports that he stopped using Prolixin after his last hospitalization and never connected with an outpatient psychiatrist. He is amenable to starting Prolixin, although he is not interested in [facility] at this time. He has also tried Haldol, Zyprexa, Risperidone, and Depakote, although feels like none of them were helpful for him.    Collateral: Sister, Linda Ortiz [phone]: Sister says she does not want to answer any questions, does not want to be involved in her brother's life, and does not want to be contacted again.

## 2025-06-30 NOTE — BH INPATIENT PSYCHIATRY DISCHARGE NOTE - NSDCMRMEDTOKEN_GEN_ALL_CORE_FT
fluPHENAZine 10 mg oral tablet: 1 tab(s) orally once a day  nicotine 4 mg oral transmucosal gum: 1 gum by transmucosal administration 3 times a day

## 2025-06-30 NOTE — BH INPATIENT PSYCHIATRY DISCHARGE NOTE - NSBHDCBILLING_PSY_ALL_CORE
76059 (Hospital discharge day management; 30 min or less) 65318 (Hospital discharge day management; more than 30 min)

## 2025-06-30 NOTE — CHART NOTE - NSCHARTNOTEFT_GEN_A_CORE
Discharge Note:    Case discussed in IDRs, patient is medically ready for discharge today, 6/30. Patient is aware and in agreement with discharge today. Patient to receive [employer] upon discharge. No emergency contact provided.     Discharge Address:    Aftercare Appointment  [facility]    03-Jul-2025 10:00  [address] [place] [address]  [phone]  Substance Use Disorder Treatment    Patient has a scheduled intake assessment with Marcie Greene -   Additional Aftercare Appointment   Dr. Abi Leary- [facility]    11-Jul-2025 12:30  [address] [place] [address] [phone]  Mental Health Treatment   Patient has a scheduled intake with Dr. Abi Leary, psychiatrist.   [facility]  [address] [place] [id number] (near [facility])  Mental Health Walk In Discharge Note:    Case discussed in IDRs, patient is medically ready for discharge today, 6/30. Patient is aware and in agreement with discharge today. Patient to receive [employer] upon discharge. No emergency contact provided.     Discharge Address: [address] [place] [address]    Aftercare Appointment  [facility]    03-Jul-2025 10:00  [address] [place] [address]  [phone]  Substance Use Disorder Treatment    Patient has a scheduled intake assessment with Marcie Greene -   Additional Aftercare Appointment   Dr. Abi Leary- [facility]    11-Jul-2025 12:30  [address] [place] [address] [phone]  Mental Health Treatment   Patient has a scheduled intake with Dr. Abi Leary, psychiatrist.   [facility]  [address] [place] [id number] (near [facility])  Mental Health Walk In

## 2025-06-30 NOTE — BH INPATIENT PSYCHIATRY DISCHARGE NOTE - DESCRIPTION
x2 years after wife and daughter (age 22)  by ?suicide (per pt report today), lives alone in [place], receives SSD. Close with sister Linda

## 2025-06-30 NOTE — BH INPATIENT PSYCHIATRY DISCHARGE NOTE - NSDCCPCAREPLAN_GEN_ALL_CORE_FT
PRINCIPAL DISCHARGE DIAGNOSIS  Diagnosis: Schizophrenia  Assessment and Plan of Treatment: Patient is a 45 year old male with a history of schizophrenia who presented with psychosis. He was started on prolixin, increased to 10mg daily, with improvement in psychosis.

## 2025-06-30 NOTE — BH INPATIENT PSYCHIATRY DISCHARGE NOTE - ATTENDING DISCHARGE PHYSICAL EXAMINATION:
Pt alert, attentive, cheerful, reports feeling well but wishes he could have been discharged sooner.  He has been awaiting followup in [place].  He denies any AVH, is not internally preoccupied, denies SI/HI.  He has been social on the unit.  He is comfortable with discharge, feels he can recognize and safely manage sx outside the hospital, no grounds to hold involuntarily at this time.  He has mental health and substance abuse followup as well as shelter.

## 2025-06-30 NOTE — BH INPATIENT PSYCHIATRY DISCHARGE NOTE - NSBHMETABOLIC_PSY_ALL_CORE_FT
BMI:   HbA1c: A1C with Estimated Average Glucose Result: 6.1 % (06-19-25 @ 12:30)    Glucose:   BP: 138/92 (06-29-25 @ 09:22) (110/76 - 138/92)Vital Signs Last 24 Hrs  T(C): --  T(F): --  HR: 92 (06-29-25 @ 09:22) (92 - 92)  BP: 138/92 (06-29-25 @ 09:22) (138/92 - 138/92)  BP(mean): --  RR: --  SpO2: 95% (06-29-25 @ 09:22) (95% - 95%)      Lipid Panel: Date/Time: 06-24-25 @ 07:26  Cholesterol, Serum: 220  LDL Cholesterol Calculated: 146  HDL Cholesterol, Serum: 55  Total Cholesterol/HDL Ration Measurement: --  Triglycerides, Serum: 109

## 2025-07-03 DIAGNOSIS — F17.210 NICOTINE DEPENDENCE, CIGARETTES, UNCOMPLICATED: ICD-10-CM

## 2025-07-03 DIAGNOSIS — F29 UNSPECIFIED PSYCHOSIS NOT DUE TO A SUBSTANCE OR KNOWN PHYSIOLOGICAL CONDITION: ICD-10-CM

## 2025-07-03 DIAGNOSIS — F16.10 HALLUCINOGEN ABUSE, UNCOMPLICATED: ICD-10-CM

## 2025-07-03 DIAGNOSIS — Z91.51 PERSONAL HISTORY OF SUICIDAL BEHAVIOR: ICD-10-CM

## 2025-07-03 DIAGNOSIS — G47.00 INSOMNIA, UNSPECIFIED: ICD-10-CM

## 2025-07-03 DIAGNOSIS — Z59.00 HOMELESSNESS UNSPECIFIED: ICD-10-CM

## 2025-07-03 DIAGNOSIS — F32.9 MAJOR DEPRESSIVE DISORDER, SINGLE EPISODE, UNSPECIFIED: ICD-10-CM

## 2025-07-03 DIAGNOSIS — F12.10 CANNABIS ABUSE, UNCOMPLICATED: ICD-10-CM

## 2025-07-03 DIAGNOSIS — F14.10 COCAINE ABUSE, UNCOMPLICATED: ICD-10-CM

## 2025-07-03 DIAGNOSIS — F20.9 SCHIZOPHRENIA, UNSPECIFIED: ICD-10-CM

## 2025-07-03 DIAGNOSIS — Z91.148 PATIENT'S OTHER NONCOMPLIANCE WITH MEDICATION REGIMEN FOR OTHER REASON: ICD-10-CM

## 2025-07-03 DIAGNOSIS — F10.10 ALCOHOL ABUSE, UNCOMPLICATED: ICD-10-CM

## 2025-07-03 DIAGNOSIS — R45.851 SUICIDAL IDEATIONS: ICD-10-CM

## 2025-07-03 DIAGNOSIS — R44.0 AUDITORY HALLUCINATIONS: ICD-10-CM

## 2025-07-03 DIAGNOSIS — T43.596A UNDERDOSING OF OTHER ANTIPSYCHOTICS AND NEUROLEPTICS, INITIAL ENCOUNTER: ICD-10-CM

## 2025-07-03 DIAGNOSIS — Z56.0 UNEMPLOYMENT, UNSPECIFIED: ICD-10-CM

## 2025-07-03 DIAGNOSIS — F81.9 DEVELOPMENTAL DISORDER OF SCHOLASTIC SKILLS, UNSPECIFIED: ICD-10-CM

## 2025-07-03 DIAGNOSIS — Z63.4 DISAPPEARANCE AND DEATH OF FAMILY MEMBER: ICD-10-CM

## 2025-07-03 SDOH — SOCIAL STABILITY - SOCIAL INSECURITY: DISSAPEARANCE AND DEATH OF FAMILY MEMBER: Z63.4

## 2025-07-03 SDOH — ECONOMIC STABILITY - INCOME SECURITY: UNEMPLOYMENT, UNSPECIFIED: Z56.0

## 2025-07-03 SDOH — ECONOMIC STABILITY - HOUSING INSECURITY: HOMELESSNESS UNSPECIFIED: Z59.00

## 2025-07-08 NOTE — BH SOCIAL WORK CONFIRMATION FOLLOW UP NOTE - NSCOMMENTS_PSY_ALL_CORE
7/8/2025: Caring Call: Pt listed as low suicide risk; no caring call is required.    7/8/2025: Linkage Call: Linkage Call: SW called/emailed (enter name of provider-both the agency and person, as well as the title, if applicable) on (enter date of call/email) to verify if patient attended appointment on (enter date of appointment). Patient (enter either attended, did not attend, or rescheduled and any other information you deem pertinent) 7/8/2025: Caring Call: Pt listed as low suicide risk; no caring call is required.    7/8/2025: Linkage Call: Linkage Call: SW called [facility] on 7/8/2025, [phone], to verify if patient attended appointment on . Patient (enter either attended, did not attend, or rescheduled and any other information you deem pertinent) 7/8/2025: Caring Call: Pt listed as low suicide risk; no caring call is required.    7/8/2025: Linkage Call: Linkage Call: SW called [facility] on 7/8/2025, [phone], to verify if patient attended substance appointment on 7/3. Per  Marcie, pt. called to reschedule the appointment and has an appointment scheduled for this afternoon. 7/8/2025: Caring Call: Pt listed as low suicide risk; no caring call is required.    7/8/2025: Linkage Call: Linkage Call: SW called [facility] on 7/8/2025, [phone], to verify if patient attended substance appointment on 7/3. Per  Marcie, pt. called to reschedule the appointment and has an appointment scheduled for this afternoon.  spoke with Marcie again at 3:27PM. Pt. did not present for intake.